# Patient Record
Sex: FEMALE | Race: WHITE | URBAN - METROPOLITAN AREA
[De-identification: names, ages, dates, MRNs, and addresses within clinical notes are randomized per-mention and may not be internally consistent; named-entity substitution may affect disease eponyms.]

---

## 2017-01-01 ENCOUNTER — APPOINTMENT (OUTPATIENT)
Dept: CARDIOLOGY | Facility: CLINIC | Age: 82
DRG: 314 | End: 2017-01-01
Attending: PHYSICIAN ASSISTANT
Payer: MEDICARE

## 2017-01-01 ENCOUNTER — APPOINTMENT (OUTPATIENT)
Dept: SPEECH THERAPY | Facility: CLINIC | Age: 82
DRG: 314 | End: 2017-01-01
Attending: PHYSICIAN ASSISTANT
Payer: MEDICARE

## 2017-01-01 ENCOUNTER — APPOINTMENT (OUTPATIENT)
Dept: CT IMAGING | Facility: CLINIC | Age: 82
DRG: 268 | End: 2017-01-01
Attending: SURGERY
Payer: MEDICARE

## 2017-01-01 ENCOUNTER — CARE COORDINATION (OUTPATIENT)
Dept: CARDIOLOGY | Facility: CLINIC | Age: 82
End: 2017-01-01

## 2017-01-01 ENCOUNTER — ANESTHESIA (OUTPATIENT)
Dept: SURGERY | Facility: CLINIC | Age: 82
DRG: 268 | End: 2017-01-01
Payer: MEDICARE

## 2017-01-01 ENCOUNTER — APPOINTMENT (OUTPATIENT)
Dept: ULTRASOUND IMAGING | Facility: CLINIC | Age: 82
DRG: 314 | End: 2017-01-01
Attending: PHYSICIAN ASSISTANT
Payer: MEDICARE

## 2017-01-01 ENCOUNTER — APPOINTMENT (OUTPATIENT)
Dept: GENERAL RADIOLOGY | Facility: CLINIC | Age: 82
DRG: 268 | End: 2017-01-01
Attending: SURGERY
Payer: MEDICARE

## 2017-01-01 ENCOUNTER — APPOINTMENT (OUTPATIENT)
Dept: GENERAL RADIOLOGY | Facility: CLINIC | Age: 82
DRG: 314 | End: 2017-01-01
Attending: PHYSICIAN ASSISTANT
Payer: MEDICARE

## 2017-01-01 ENCOUNTER — APPOINTMENT (OUTPATIENT)
Dept: SPEECH THERAPY | Facility: CLINIC | Age: 82
DRG: 314 | End: 2017-01-01
Attending: INTERNAL MEDICINE
Payer: MEDICARE

## 2017-01-01 ENCOUNTER — APPOINTMENT (OUTPATIENT)
Dept: MRI IMAGING | Facility: CLINIC | Age: 82
DRG: 268 | End: 2017-01-01
Attending: SURGERY
Payer: MEDICARE

## 2017-01-01 ENCOUNTER — APPOINTMENT (OUTPATIENT)
Dept: PHYSICAL THERAPY | Facility: CLINIC | Age: 82
DRG: 314 | End: 2017-01-01
Attending: INTERNAL MEDICINE
Payer: MEDICARE

## 2017-01-01 ENCOUNTER — ANESTHESIA EVENT (OUTPATIENT)
Dept: SURGERY | Facility: CLINIC | Age: 82
DRG: 268 | End: 2017-01-01
Payer: MEDICARE

## 2017-01-01 ENCOUNTER — APPOINTMENT (OUTPATIENT)
Dept: PHYSICAL THERAPY | Facility: CLINIC | Age: 82
DRG: 314 | End: 2017-01-01
Attending: PHYSICIAN ASSISTANT
Payer: MEDICARE

## 2017-01-01 ENCOUNTER — TELEPHONE (OUTPATIENT)
Dept: CARE COORDINATION | Facility: CLINIC | Age: 82
End: 2017-01-01

## 2017-01-01 ENCOUNTER — HOSPITAL ENCOUNTER (INPATIENT)
Facility: CLINIC | Age: 82
LOS: 11 days | Discharge: NURSING, REHAB OR LONG-TERM CARE | DRG: 268 | End: 2017-10-17
Attending: SURGERY | Admitting: SURGERY
Payer: MEDICARE

## 2017-01-01 ENCOUNTER — HOSPITAL ENCOUNTER (INPATIENT)
Facility: CLINIC | Age: 82
LOS: 11 days | Discharge: HOSPICE | DRG: 314 | End: 2017-11-27
Attending: INTERNAL MEDICINE | Admitting: INTERNAL MEDICINE
Payer: MEDICARE

## 2017-01-01 ENCOUNTER — APPOINTMENT (OUTPATIENT)
Dept: PHYSICAL THERAPY | Facility: CLINIC | Age: 82
DRG: 268 | End: 2017-01-01
Attending: SURGERY
Payer: MEDICARE

## 2017-01-01 ENCOUNTER — DOCUMENTATION ONLY (OUTPATIENT)
Dept: VASCULAR SURGERY | Facility: CLINIC | Age: 82
End: 2017-01-01

## 2017-01-01 ENCOUNTER — APPOINTMENT (OUTPATIENT)
Dept: GENERAL RADIOLOGY | Facility: CLINIC | Age: 82
DRG: 268 | End: 2017-01-01
Attending: CLINICAL NURSE SPECIALIST
Payer: MEDICARE

## 2017-01-01 ENCOUNTER — APPOINTMENT (OUTPATIENT)
Dept: OCCUPATIONAL THERAPY | Facility: CLINIC | Age: 82
DRG: 268 | End: 2017-01-01
Attending: SURGERY
Payer: MEDICARE

## 2017-01-01 ENCOUNTER — APPOINTMENT (OUTPATIENT)
Dept: INTERVENTIONAL RADIOLOGY/VASCULAR | Facility: CLINIC | Age: 82
DRG: 268 | End: 2017-01-01
Attending: SURGERY
Payer: MEDICARE

## 2017-01-01 ENCOUNTER — APPOINTMENT (OUTPATIENT)
Dept: PET IMAGING | Facility: CLINIC | Age: 82
DRG: 314 | End: 2017-01-01
Attending: PHYSICIAN ASSISTANT
Payer: MEDICARE

## 2017-01-01 ENCOUNTER — APPOINTMENT (OUTPATIENT)
Dept: CARDIOLOGY | Facility: CLINIC | Age: 82
DRG: 268 | End: 2017-01-01
Attending: SURGERY
Payer: MEDICARE

## 2017-01-01 ENCOUNTER — APPOINTMENT (OUTPATIENT)
Dept: SPEECH THERAPY | Facility: CLINIC | Age: 82
DRG: 268 | End: 2017-01-01
Attending: STUDENT IN AN ORGANIZED HEALTH CARE EDUCATION/TRAINING PROGRAM
Payer: MEDICARE

## 2017-01-01 ENCOUNTER — APPOINTMENT (OUTPATIENT)
Dept: ULTRASOUND IMAGING | Facility: CLINIC | Age: 82
DRG: 268 | End: 2017-01-01
Attending: SURGERY
Payer: MEDICARE

## 2017-01-01 ENCOUNTER — APPOINTMENT (OUTPATIENT)
Dept: CT IMAGING | Facility: CLINIC | Age: 82
DRG: 314 | End: 2017-01-01
Attending: INTERNAL MEDICINE
Payer: MEDICARE

## 2017-01-01 VITALS
HEIGHT: 68 IN | TEMPERATURE: 98.4 F | HEART RATE: 80 BPM | DIASTOLIC BLOOD PRESSURE: 50 MMHG | WEIGHT: 180 LBS | BODY MASS INDEX: 27.28 KG/M2 | OXYGEN SATURATION: 95 % | SYSTOLIC BLOOD PRESSURE: 114 MMHG | RESPIRATION RATE: 20 BRPM

## 2017-01-01 VITALS
HEIGHT: 68 IN | TEMPERATURE: 98.2 F | WEIGHT: 167.1 LBS | RESPIRATION RATE: 18 BRPM | BODY MASS INDEX: 25.33 KG/M2 | DIASTOLIC BLOOD PRESSURE: 66 MMHG | HEART RATE: 111 BPM | SYSTOLIC BLOOD PRESSURE: 138 MMHG | OXYGEN SATURATION: 97 %

## 2017-01-01 DIAGNOSIS — R62.51 FAILURE TO THRIVE (0-17): Primary | ICD-10-CM

## 2017-01-01 DIAGNOSIS — Z51.5 COMFORT MEASURES ONLY STATUS: ICD-10-CM

## 2017-01-01 DIAGNOSIS — I71.40 ABDOMINAL AORTIC ANEURYSM (AAA) WITHOUT RUPTURE (H): Primary | ICD-10-CM

## 2017-01-01 LAB
ABO + RH BLD: NORMAL
ALBUMIN SERPL-MCNC: 1.8 G/DL (ref 3.4–5)
ALBUMIN SERPL-MCNC: 2.4 G/DL (ref 3.4–5)
ALBUMIN UR-MCNC: 100 MG/DL
ALP SERPL-CCNC: 38 U/L (ref 40–150)
ALP SERPL-CCNC: 45 U/L (ref 40–150)
ALT SERPL W P-5'-P-CCNC: 18 U/L (ref 0–50)
ALT SERPL W P-5'-P-CCNC: 19 U/L (ref 0–50)
ANION GAP SERPL CALCULATED.3IONS-SCNC: 10 MMOL/L (ref 3–14)
ANION GAP SERPL CALCULATED.3IONS-SCNC: 11 MMOL/L (ref 3–14)
ANION GAP SERPL CALCULATED.3IONS-SCNC: 11 MMOL/L (ref 3–14)
ANION GAP SERPL CALCULATED.3IONS-SCNC: 7 MMOL/L (ref 3–14)
ANION GAP SERPL CALCULATED.3IONS-SCNC: 8 MMOL/L (ref 3–14)
ANION GAP SERPL CALCULATED.3IONS-SCNC: 9 MMOL/L (ref 3–14)
APPEARANCE UR: ABNORMAL
APTT PPP: 38 SEC (ref 22–37)
AST SERPL W P-5'-P-CCNC: 24 U/L (ref 0–45)
AST SERPL W P-5'-P-CCNC: 27 U/L (ref 0–45)
BACTERIA #/AREA URNS HPF: ABNORMAL /HPF
BACTERIA SPEC CULT: NO GROWTH
BASE DEFICIT BLDA-SCNC: 6 MMOL/L
BASE DEFICIT BLDA-SCNC: 7 MMOL/L
BASE DEFICIT BLDA-SCNC: 7 MMOL/L
BASE DEFICIT BLDA-SCNC: 7.9 MMOL/L
BILIRUB SERPL-MCNC: 0.2 MG/DL (ref 0.2–1.3)
BILIRUB SERPL-MCNC: 0.4 MG/DL (ref 0.2–1.3)
BILIRUB UR QL STRIP: NEGATIVE
BLD GP AB SCN SERPL QL: NORMAL
BLD GP AB SCN SERPL QL: NORMAL
BLD PROD TYP BPU: NORMAL
BLD UNIT ID BPU: 0
BLOOD BANK CMNT PATIENT-IMP: NORMAL
BLOOD BANK CMNT PATIENT-IMP: NORMAL
BLOOD PRODUCT CODE: NORMAL
BPU ID: NORMAL
BUN SERPL-MCNC: 19 MG/DL (ref 7–30)
BUN SERPL-MCNC: 22 MG/DL (ref 7–30)
BUN SERPL-MCNC: 24 MG/DL (ref 7–30)
BUN SERPL-MCNC: 24 MG/DL (ref 7–30)
BUN SERPL-MCNC: 27 MG/DL (ref 7–30)
BUN SERPL-MCNC: 27 MG/DL (ref 7–30)
BUN SERPL-MCNC: 28 MG/DL (ref 7–30)
BUN SERPL-MCNC: 28 MG/DL (ref 7–30)
BUN SERPL-MCNC: 29 MG/DL (ref 7–30)
BUN SERPL-MCNC: 29 MG/DL (ref 7–30)
BUN SERPL-MCNC: 32 MG/DL (ref 7–30)
BUN SERPL-MCNC: 36 MG/DL (ref 7–30)
BUN SERPL-MCNC: 42 MG/DL (ref 7–30)
BUN SERPL-MCNC: 45 MG/DL (ref 7–30)
BUN SERPL-MCNC: 49 MG/DL (ref 7–30)
BUN SERPL-MCNC: 52 MG/DL (ref 7–30)
BUN SERPL-MCNC: 57 MG/DL (ref 7–30)
CA-I BLD-MCNC: 4.6 MG/DL (ref 4.4–5.2)
CA-I BLD-MCNC: 4.9 MG/DL (ref 4.4–5.2)
CA-I BLD-MCNC: 5.1 MG/DL (ref 4.4–5.2)
CALCIUM SERPL-MCNC: 7.8 MG/DL (ref 8.5–10.1)
CALCIUM SERPL-MCNC: 7.9 MG/DL (ref 8.5–10.1)
CALCIUM SERPL-MCNC: 8 MG/DL (ref 8.5–10.1)
CALCIUM SERPL-MCNC: 8.2 MG/DL (ref 8.5–10.1)
CALCIUM SERPL-MCNC: 8.2 MG/DL (ref 8.5–10.1)
CALCIUM SERPL-MCNC: 8.3 MG/DL (ref 8.5–10.1)
CALCIUM SERPL-MCNC: 8.4 MG/DL (ref 8.5–10.1)
CALCIUM SERPL-MCNC: 8.4 MG/DL (ref 8.5–10.1)
CALCIUM SERPL-MCNC: 8.5 MG/DL (ref 8.5–10.1)
CALCIUM SERPL-MCNC: 8.6 MG/DL (ref 8.5–10.1)
CALCIUM SERPL-MCNC: 8.6 MG/DL (ref 8.5–10.1)
CALCIUM SERPL-MCNC: 9 MG/DL (ref 8.5–10.1)
CHLORIDE SERPL-SCNC: 100 MMOL/L (ref 94–109)
CHLORIDE SERPL-SCNC: 101 MMOL/L (ref 94–109)
CHLORIDE SERPL-SCNC: 103 MMOL/L (ref 94–109)
CHLORIDE SERPL-SCNC: 106 MMOL/L (ref 94–109)
CHLORIDE SERPL-SCNC: 107 MMOL/L (ref 94–109)
CHLORIDE SERPL-SCNC: 107 MMOL/L (ref 94–109)
CHLORIDE SERPL-SCNC: 108 MMOL/L (ref 94–109)
CHLORIDE SERPL-SCNC: 108 MMOL/L (ref 94–109)
CHLORIDE SERPL-SCNC: 109 MMOL/L (ref 94–109)
CHLORIDE SERPL-SCNC: 111 MMOL/L (ref 94–109)
CHLORIDE SERPL-SCNC: 112 MMOL/L (ref 94–109)
CHLORIDE SERPL-SCNC: 113 MMOL/L (ref 94–109)
CHLORIDE SERPL-SCNC: 113 MMOL/L (ref 94–109)
CHLORIDE SERPL-SCNC: 115 MMOL/L (ref 94–109)
CHLORIDE SERPL-SCNC: 116 MMOL/L (ref 94–109)
CO2 SERPL-SCNC: 14 MMOL/L (ref 20–32)
CO2 SERPL-SCNC: 17 MMOL/L (ref 20–32)
CO2 SERPL-SCNC: 17 MMOL/L (ref 20–32)
CO2 SERPL-SCNC: 19 MMOL/L (ref 20–32)
CO2 SERPL-SCNC: 20 MMOL/L (ref 20–32)
CO2 SERPL-SCNC: 21 MMOL/L (ref 20–32)
CO2 SERPL-SCNC: 21 MMOL/L (ref 20–32)
CO2 SERPL-SCNC: 22 MMOL/L (ref 20–32)
CO2 SERPL-SCNC: 22 MMOL/L (ref 20–32)
CO2 SERPL-SCNC: 23 MMOL/L (ref 20–32)
CO2 SERPL-SCNC: 23 MMOL/L (ref 20–32)
CO2 SERPL-SCNC: 24 MMOL/L (ref 20–32)
CO2 SERPL-SCNC: 24 MMOL/L (ref 20–32)
COLOR UR AUTO: YELLOW
CREAT SERPL-MCNC: 2.09 MG/DL (ref 0.52–1.04)
CREAT SERPL-MCNC: 2.17 MG/DL (ref 0.52–1.04)
CREAT SERPL-MCNC: 2.21 MG/DL (ref 0.52–1.04)
CREAT SERPL-MCNC: 2.27 MG/DL (ref 0.52–1.04)
CREAT SERPL-MCNC: 2.38 MG/DL (ref 0.52–1.04)
CREAT SERPL-MCNC: 2.49 MG/DL (ref 0.52–1.04)
CREAT SERPL-MCNC: 2.54 MG/DL (ref 0.52–1.04)
CREAT SERPL-MCNC: 3.46 MG/DL (ref 0.52–1.04)
CREAT SERPL-MCNC: 3.56 MG/DL (ref 0.52–1.04)
CREAT SERPL-MCNC: 3.62 MG/DL (ref 0.52–1.04)
CREAT SERPL-MCNC: 3.74 MG/DL (ref 0.52–1.04)
CREAT SERPL-MCNC: 3.75 MG/DL (ref 0.52–1.04)
CREAT SERPL-MCNC: 3.82 MG/DL (ref 0.52–1.04)
CREAT SERPL-MCNC: 3.87 MG/DL (ref 0.52–1.04)
CREAT SERPL-MCNC: 3.89 MG/DL (ref 0.52–1.04)
CREAT SERPL-MCNC: 4.06 MG/DL (ref 0.52–1.04)
CREAT SERPL-MCNC: 4.27 MG/DL (ref 0.52–1.04)
CRP SERPL-MCNC: 110 MG/L (ref 0–8)
CRP SERPL-MCNC: 120 MG/L (ref 0–8)
CRP SERPL-MCNC: 120 MG/L (ref 0–8)
CRP SERPL-MCNC: 130 MG/L (ref 0–8)
CRP SERPL-MCNC: 36 MG/L (ref 0–8)
CRP SERPL-MCNC: 42 MG/L (ref 0–8)
CRP SERPL-MCNC: 63 MG/L (ref 0–8)
CRP SERPL-MCNC: 84 MG/L (ref 0–8)
ERYTHROCYTE [DISTWIDTH] IN BLOOD BY AUTOMATED COUNT: 14.2 % (ref 10–15)
ERYTHROCYTE [DISTWIDTH] IN BLOOD BY AUTOMATED COUNT: 14.2 % (ref 10–15)
ERYTHROCYTE [DISTWIDTH] IN BLOOD BY AUTOMATED COUNT: 14.8 % (ref 10–15)
ERYTHROCYTE [DISTWIDTH] IN BLOOD BY AUTOMATED COUNT: 15 % (ref 10–15)
ERYTHROCYTE [DISTWIDTH] IN BLOOD BY AUTOMATED COUNT: 15.1 % (ref 10–15)
ERYTHROCYTE [DISTWIDTH] IN BLOOD BY AUTOMATED COUNT: 15.3 % (ref 10–15)
ERYTHROCYTE [DISTWIDTH] IN BLOOD BY AUTOMATED COUNT: 15.5 % (ref 10–15)
ERYTHROCYTE [DISTWIDTH] IN BLOOD BY AUTOMATED COUNT: 15.7 % (ref 10–15)
ERYTHROCYTE [DISTWIDTH] IN BLOOD BY AUTOMATED COUNT: 15.7 % (ref 10–15)
ERYTHROCYTE [DISTWIDTH] IN BLOOD BY AUTOMATED COUNT: 15.8 % (ref 10–15)
ERYTHROCYTE [DISTWIDTH] IN BLOOD BY AUTOMATED COUNT: 16.1 % (ref 10–15)
ERYTHROCYTE [DISTWIDTH] IN BLOOD BY AUTOMATED COUNT: 16.3 % (ref 10–15)
ERYTHROCYTE [SEDIMENTATION RATE] IN BLOOD BY WESTERGREN METHOD: 92 MM/H (ref 0–30)
GFR SERPL CREATININE-BSD FRML MDRD: 10 ML/MIN/1.7M2
GFR SERPL CREATININE-BSD FRML MDRD: 10 ML/MIN/1.7M2
GFR SERPL CREATININE-BSD FRML MDRD: 11 ML/MIN/1.7M2
GFR SERPL CREATININE-BSD FRML MDRD: 12 ML/MIN/1.7M2
GFR SERPL CREATININE-BSD FRML MDRD: 13 ML/MIN/1.7M2
GFR SERPL CREATININE-BSD FRML MDRD: 18 ML/MIN/1.7M2
GFR SERPL CREATININE-BSD FRML MDRD: 18 ML/MIN/1.7M2
GFR SERPL CREATININE-BSD FRML MDRD: 19 ML/MIN/1.7M2
GFR SERPL CREATININE-BSD FRML MDRD: 20 ML/MIN/1.7M2
GFR SERPL CREATININE-BSD FRML MDRD: 21 ML/MIN/1.7M2
GFR SERPL CREATININE-BSD FRML MDRD: 22 ML/MIN/1.7M2
GFR SERPL CREATININE-BSD FRML MDRD: 23 ML/MIN/1.7M2
GLUCOSE BLD-MCNC: 108 MG/DL (ref 70–99)
GLUCOSE BLD-MCNC: 114 MG/DL (ref 70–99)
GLUCOSE BLD-MCNC: 119 MG/DL (ref 70–99)
GLUCOSE BLDC GLUCOMTR-MCNC: 100 MG/DL (ref 70–99)
GLUCOSE BLDC GLUCOMTR-MCNC: 101 MG/DL (ref 70–99)
GLUCOSE BLDC GLUCOMTR-MCNC: 102 MG/DL (ref 70–99)
GLUCOSE BLDC GLUCOMTR-MCNC: 103 MG/DL (ref 70–99)
GLUCOSE BLDC GLUCOMTR-MCNC: 104 MG/DL (ref 70–99)
GLUCOSE BLDC GLUCOMTR-MCNC: 105 MG/DL (ref 70–99)
GLUCOSE BLDC GLUCOMTR-MCNC: 106 MG/DL (ref 70–99)
GLUCOSE BLDC GLUCOMTR-MCNC: 106 MG/DL (ref 70–99)
GLUCOSE BLDC GLUCOMTR-MCNC: 107 MG/DL (ref 70–99)
GLUCOSE BLDC GLUCOMTR-MCNC: 108 MG/DL (ref 70–99)
GLUCOSE BLDC GLUCOMTR-MCNC: 109 MG/DL (ref 70–99)
GLUCOSE BLDC GLUCOMTR-MCNC: 110 MG/DL (ref 70–99)
GLUCOSE BLDC GLUCOMTR-MCNC: 111 MG/DL (ref 70–99)
GLUCOSE BLDC GLUCOMTR-MCNC: 112 MG/DL (ref 70–99)
GLUCOSE BLDC GLUCOMTR-MCNC: 113 MG/DL (ref 70–99)
GLUCOSE BLDC GLUCOMTR-MCNC: 113 MG/DL (ref 70–99)
GLUCOSE BLDC GLUCOMTR-MCNC: 114 MG/DL (ref 70–99)
GLUCOSE BLDC GLUCOMTR-MCNC: 115 MG/DL (ref 70–99)
GLUCOSE BLDC GLUCOMTR-MCNC: 115 MG/DL (ref 70–99)
GLUCOSE BLDC GLUCOMTR-MCNC: 116 MG/DL (ref 70–99)
GLUCOSE BLDC GLUCOMTR-MCNC: 119 MG/DL (ref 70–99)
GLUCOSE BLDC GLUCOMTR-MCNC: 120 MG/DL (ref 70–99)
GLUCOSE BLDC GLUCOMTR-MCNC: 121 MG/DL (ref 70–99)
GLUCOSE BLDC GLUCOMTR-MCNC: 122 MG/DL (ref 70–99)
GLUCOSE BLDC GLUCOMTR-MCNC: 123 MG/DL (ref 70–99)
GLUCOSE BLDC GLUCOMTR-MCNC: 124 MG/DL (ref 70–99)
GLUCOSE BLDC GLUCOMTR-MCNC: 125 MG/DL (ref 70–99)
GLUCOSE BLDC GLUCOMTR-MCNC: 126 MG/DL (ref 70–99)
GLUCOSE BLDC GLUCOMTR-MCNC: 126 MG/DL (ref 70–99)
GLUCOSE BLDC GLUCOMTR-MCNC: 127 MG/DL (ref 70–99)
GLUCOSE BLDC GLUCOMTR-MCNC: 127 MG/DL (ref 70–99)
GLUCOSE BLDC GLUCOMTR-MCNC: 128 MG/DL (ref 70–99)
GLUCOSE BLDC GLUCOMTR-MCNC: 130 MG/DL (ref 70–99)
GLUCOSE BLDC GLUCOMTR-MCNC: 132 MG/DL (ref 70–99)
GLUCOSE BLDC GLUCOMTR-MCNC: 132 MG/DL (ref 70–99)
GLUCOSE BLDC GLUCOMTR-MCNC: 133 MG/DL (ref 70–99)
GLUCOSE BLDC GLUCOMTR-MCNC: 134 MG/DL (ref 70–99)
GLUCOSE BLDC GLUCOMTR-MCNC: 135 MG/DL (ref 70–99)
GLUCOSE BLDC GLUCOMTR-MCNC: 135 MG/DL (ref 70–99)
GLUCOSE BLDC GLUCOMTR-MCNC: 137 MG/DL (ref 70–99)
GLUCOSE BLDC GLUCOMTR-MCNC: 137 MG/DL (ref 70–99)
GLUCOSE BLDC GLUCOMTR-MCNC: 140 MG/DL (ref 70–99)
GLUCOSE BLDC GLUCOMTR-MCNC: 140 MG/DL (ref 70–99)
GLUCOSE BLDC GLUCOMTR-MCNC: 141 MG/DL (ref 70–99)
GLUCOSE BLDC GLUCOMTR-MCNC: 142 MG/DL (ref 70–99)
GLUCOSE BLDC GLUCOMTR-MCNC: 143 MG/DL (ref 70–99)
GLUCOSE BLDC GLUCOMTR-MCNC: 144 MG/DL (ref 70–99)
GLUCOSE BLDC GLUCOMTR-MCNC: 145 MG/DL (ref 70–99)
GLUCOSE BLDC GLUCOMTR-MCNC: 147 MG/DL (ref 70–99)
GLUCOSE BLDC GLUCOMTR-MCNC: 150 MG/DL (ref 70–99)
GLUCOSE BLDC GLUCOMTR-MCNC: 151 MG/DL (ref 70–99)
GLUCOSE BLDC GLUCOMTR-MCNC: 156 MG/DL (ref 70–99)
GLUCOSE BLDC GLUCOMTR-MCNC: 157 MG/DL (ref 70–99)
GLUCOSE BLDC GLUCOMTR-MCNC: 165 MG/DL (ref 70–99)
GLUCOSE BLDC GLUCOMTR-MCNC: 166 MG/DL (ref 70–99)
GLUCOSE BLDC GLUCOMTR-MCNC: 169 MG/DL (ref 70–99)
GLUCOSE BLDC GLUCOMTR-MCNC: 175 MG/DL (ref 70–99)
GLUCOSE BLDC GLUCOMTR-MCNC: 180 MG/DL (ref 70–99)
GLUCOSE BLDC GLUCOMTR-MCNC: 181 MG/DL (ref 70–99)
GLUCOSE BLDC GLUCOMTR-MCNC: 185 MG/DL (ref 70–99)
GLUCOSE BLDC GLUCOMTR-MCNC: 188 MG/DL (ref 70–99)
GLUCOSE BLDC GLUCOMTR-MCNC: 188 MG/DL (ref 70–99)
GLUCOSE BLDC GLUCOMTR-MCNC: 86 MG/DL (ref 70–99)
GLUCOSE BLDC GLUCOMTR-MCNC: 89 MG/DL (ref 70–99)
GLUCOSE BLDC GLUCOMTR-MCNC: 94 MG/DL (ref 70–99)
GLUCOSE BLDC GLUCOMTR-MCNC: 94 MG/DL (ref 70–99)
GLUCOSE BLDC GLUCOMTR-MCNC: 98 MG/DL (ref 70–99)
GLUCOSE SERPL-MCNC: 107 MG/DL (ref 70–99)
GLUCOSE SERPL-MCNC: 108 MG/DL (ref 70–99)
GLUCOSE SERPL-MCNC: 108 MG/DL (ref 70–99)
GLUCOSE SERPL-MCNC: 115 MG/DL (ref 70–99)
GLUCOSE SERPL-MCNC: 116 MG/DL (ref 70–99)
GLUCOSE SERPL-MCNC: 119 MG/DL (ref 70–99)
GLUCOSE SERPL-MCNC: 122 MG/DL (ref 70–99)
GLUCOSE SERPL-MCNC: 122 MG/DL (ref 70–99)
GLUCOSE SERPL-MCNC: 123 MG/DL (ref 70–99)
GLUCOSE SERPL-MCNC: 127 MG/DL (ref 70–99)
GLUCOSE SERPL-MCNC: 127 MG/DL (ref 70–99)
GLUCOSE SERPL-MCNC: 128 MG/DL (ref 70–99)
GLUCOSE SERPL-MCNC: 131 MG/DL (ref 70–99)
GLUCOSE SERPL-MCNC: 132 MG/DL (ref 70–99)
GLUCOSE SERPL-MCNC: 137 MG/DL (ref 70–99)
GLUCOSE SERPL-MCNC: 141 MG/DL (ref 70–99)
GLUCOSE SERPL-MCNC: 184 MG/DL (ref 70–99)
GLUCOSE UR STRIP-MCNC: 300 MG/DL
HBA1C MFR BLD: 6.7 % (ref 4.3–6)
HCO3 BLD-SCNC: 19 MMOL/L (ref 21–28)
HCO3 BLD-SCNC: 20 MMOL/L (ref 21–28)
HCO3 BLD-SCNC: 21 MMOL/L (ref 21–28)
HCO3 BLD-SCNC: 22 MMOL/L (ref 21–28)
HCT VFR BLD AUTO: 26.3 % (ref 35–47)
HCT VFR BLD AUTO: 26.7 % (ref 35–47)
HCT VFR BLD AUTO: 26.7 % (ref 35–47)
HCT VFR BLD AUTO: 27 % (ref 35–47)
HCT VFR BLD AUTO: 27.2 % (ref 35–47)
HCT VFR BLD AUTO: 27.6 % (ref 35–47)
HCT VFR BLD AUTO: 27.9 % (ref 35–47)
HCT VFR BLD AUTO: 28.2 % (ref 35–47)
HCT VFR BLD AUTO: 28.3 % (ref 35–47)
HCT VFR BLD AUTO: 29.7 % (ref 35–47)
HCT VFR BLD AUTO: 29.8 % (ref 35–47)
HCT VFR BLD AUTO: 30.1 % (ref 35–47)
HCT VFR BLD AUTO: 31 % (ref 35–47)
HCT VFR BLD AUTO: 31.6 % (ref 35–47)
HCT VFR BLD AUTO: 34.9 % (ref 35–47)
HGB BLD-MCNC: 10.3 G/DL (ref 11.7–15.7)
HGB BLD-MCNC: 10.7 G/DL (ref 11.7–15.7)
HGB BLD-MCNC: 11.1 G/DL (ref 11.7–15.7)
HGB BLD-MCNC: 8.1 G/DL (ref 11.7–15.7)
HGB BLD-MCNC: 8.1 G/DL (ref 11.7–15.7)
HGB BLD-MCNC: 8.2 G/DL (ref 11.7–15.7)
HGB BLD-MCNC: 8.3 G/DL (ref 11.7–15.7)
HGB BLD-MCNC: 8.3 G/DL (ref 11.7–15.7)
HGB BLD-MCNC: 8.4 G/DL (ref 11.7–15.7)
HGB BLD-MCNC: 8.5 G/DL (ref 11.7–15.7)
HGB BLD-MCNC: 8.5 G/DL (ref 11.7–15.7)
HGB BLD-MCNC: 8.7 G/DL (ref 11.7–15.7)
HGB BLD-MCNC: 9.1 G/DL (ref 11.7–15.7)
HGB BLD-MCNC: 9.3 G/DL (ref 11.7–15.7)
HGB BLD-MCNC: 9.3 G/DL (ref 11.7–15.7)
HGB BLD-MCNC: 9.4 G/DL (ref 11.7–15.7)
HGB BLD-MCNC: 9.7 G/DL (ref 11.7–15.7)
HGB BLD-MCNC: 9.9 G/DL (ref 11.7–15.7)
HGB UR QL STRIP: ABNORMAL
INR PPP: 1.14 (ref 0.86–1.14)
INR PPP: 1.2 (ref 0.86–1.14)
INTERPRETATION ECG - MUSE: NORMAL
KCT BLD-ACNC: 103 SEC (ref 105–167)
KCT BLD-ACNC: 164 SEC (ref 105–167)
KCT BLD-ACNC: 196 SEC (ref 105–167)
KCT BLD-ACNC: 221 SEC (ref 105–167)
KCT BLD-ACNC: 225 SEC (ref 105–167)
KCT BLD-ACNC: 225 SEC (ref 105–167)
KETONES UR STRIP-MCNC: NEGATIVE MG/DL
LACTATE BLD-SCNC: 0.4 MMOL/L (ref 0.7–2)
LACTATE BLD-SCNC: 0.5 MMOL/L (ref 0.7–2)
LACTATE BLD-SCNC: 0.7 MMOL/L (ref 0.7–2)
LACTATE BLD-SCNC: 0.8 MMOL/L (ref 0.7–2)
LACTATE BLD-SCNC: 0.9 MMOL/L (ref 0.7–2)
LACTATE SERPL-SCNC: 0.7 MMOL/L (ref 0.4–2)
LACTATE SERPL-SCNC: 1 MMOL/L (ref 0.4–2)
LEUKOCYTE ESTERASE UR QL STRIP: ABNORMAL
LIPASE SERPL-CCNC: 635 U/L (ref 73–393)
Lab: NORMAL
MAGNESIUM SERPL-MCNC: 2.1 MG/DL (ref 1.6–2.3)
MAGNESIUM SERPL-MCNC: 2.2 MG/DL (ref 1.6–2.3)
MAGNESIUM SERPL-MCNC: 2.5 MG/DL (ref 1.6–2.3)
MCH RBC QN AUTO: 26.1 PG (ref 26.5–33)
MCH RBC QN AUTO: 26.4 PG (ref 26.5–33)
MCH RBC QN AUTO: 26.6 PG (ref 26.5–33)
MCH RBC QN AUTO: 26.7 PG (ref 26.5–33)
MCH RBC QN AUTO: 26.8 PG (ref 26.5–33)
MCH RBC QN AUTO: 26.9 PG (ref 26.5–33)
MCH RBC QN AUTO: 27 PG (ref 26.5–33)
MCH RBC QN AUTO: 27.8 PG (ref 26.5–33)
MCH RBC QN AUTO: 28 PG (ref 26.5–33)
MCH RBC QN AUTO: 28 PG (ref 26.5–33)
MCH RBC QN AUTO: 28.1 PG (ref 26.5–33)
MCH RBC QN AUTO: 28.1 PG (ref 26.5–33)
MCH RBC QN AUTO: 28.3 PG (ref 26.5–33)
MCHC RBC AUTO-ENTMCNC: 29 G/DL (ref 31.5–36.5)
MCHC RBC AUTO-ENTMCNC: 30 G/DL (ref 31.5–36.5)
MCHC RBC AUTO-ENTMCNC: 30.3 G/DL (ref 31.5–36.5)
MCHC RBC AUTO-ENTMCNC: 30.4 G/DL (ref 31.5–36.5)
MCHC RBC AUTO-ENTMCNC: 30.5 G/DL (ref 31.5–36.5)
MCHC RBC AUTO-ENTMCNC: 30.5 G/DL (ref 31.5–36.5)
MCHC RBC AUTO-ENTMCNC: 30.9 G/DL (ref 31.5–36.5)
MCHC RBC AUTO-ENTMCNC: 30.9 G/DL (ref 31.5–36.5)
MCHC RBC AUTO-ENTMCNC: 31.2 G/DL (ref 31.5–36.5)
MCHC RBC AUTO-ENTMCNC: 31.3 G/DL (ref 31.5–36.5)
MCHC RBC AUTO-ENTMCNC: 31.3 G/DL (ref 31.5–36.5)
MCHC RBC AUTO-ENTMCNC: 31.6 G/DL (ref 31.5–36.5)
MCHC RBC AUTO-ENTMCNC: 31.6 G/DL (ref 31.5–36.5)
MCHC RBC AUTO-ENTMCNC: 31.8 G/DL (ref 31.5–36.5)
MCHC RBC AUTO-ENTMCNC: 31.8 G/DL (ref 31.5–36.5)
MCV RBC AUTO: 86 FL (ref 78–100)
MCV RBC AUTO: 86 FL (ref 78–100)
MCV RBC AUTO: 87 FL (ref 78–100)
MCV RBC AUTO: 88 FL (ref 78–100)
MCV RBC AUTO: 89 FL (ref 78–100)
MCV RBC AUTO: 90 FL (ref 78–100)
MCV RBC AUTO: 93 FL (ref 78–100)
MRSA DNA SPEC QL NAA+PROBE: NEGATIVE
MUCOUS THREADS #/AREA URNS LPF: PRESENT /LPF
NITRATE UR QL: NEGATIVE
NUM BPU REQUESTED: 4
O2/TOTAL GAS SETTING VFR VENT: 21 %
O2/TOTAL GAS SETTING VFR VENT: 60 %
O2/TOTAL GAS SETTING VFR VENT: 60 %
O2/TOTAL GAS SETTING VFR VENT: ABNORMAL %
PCO2 BLD: 32 MM HG (ref 35–45)
PCO2 BLD: 47 MM HG (ref 35–45)
PCO2 BLD: 60 MM HG (ref 35–45)
PCO2 BLD: 61 MM HG (ref 35–45)
PH BLD: 7.16 PH (ref 7.35–7.45)
PH BLD: 7.16 PH (ref 7.35–7.45)
PH BLD: 7.24 PH (ref 7.35–7.45)
PH BLD: 7.37 PH (ref 7.35–7.45)
PH UR STRIP: 6 PH (ref 5–7)
PHOSPHATE SERPL-MCNC: 3.3 MG/DL (ref 2.5–4.5)
PLATELET # BLD AUTO: 151 10E9/L (ref 150–450)
PLATELET # BLD AUTO: 151 10E9/L (ref 150–450)
PLATELET # BLD AUTO: 153 10E9/L (ref 150–450)
PLATELET # BLD AUTO: 159 10E9/L (ref 150–450)
PLATELET # BLD AUTO: 160 10E9/L (ref 150–450)
PLATELET # BLD AUTO: 163 10E9/L (ref 150–450)
PLATELET # BLD AUTO: 171 10E9/L (ref 150–450)
PLATELET # BLD AUTO: 174 10E9/L (ref 150–450)
PLATELET # BLD AUTO: 175 10E9/L (ref 150–450)
PLATELET # BLD AUTO: 182 10E9/L (ref 150–450)
PLATELET # BLD AUTO: 183 10E9/L (ref 150–450)
PLATELET # BLD AUTO: 187 10E9/L (ref 150–450)
PLATELET # BLD AUTO: 189 10E9/L (ref 150–450)
PLATELET # BLD AUTO: 197 10E9/L (ref 150–450)
PLATELET # BLD AUTO: 199 10E9/L (ref 150–450)
PLATELET # BLD AUTO: 218 10E9/L (ref 150–450)
PLATELET # BLD AUTO: 246 10E9/L (ref 150–450)
PO2 BLD: 122 MM HG (ref 80–105)
PO2 BLD: 66 MM HG (ref 80–105)
PO2 BLD: 85 MM HG (ref 80–105)
PO2 BLD: 89 MM HG (ref 80–105)
POTASSIUM BLD-SCNC: 3.1 MMOL/L (ref 3.4–5.3)
POTASSIUM BLD-SCNC: 3.5 MMOL/L (ref 3.4–5.3)
POTASSIUM BLD-SCNC: 3.6 MMOL/L (ref 3.4–5.3)
POTASSIUM SERPL-SCNC: 3 MMOL/L (ref 3.4–5.3)
POTASSIUM SERPL-SCNC: 3.1 MMOL/L (ref 3.4–5.3)
POTASSIUM SERPL-SCNC: 3.3 MMOL/L (ref 3.4–5.3)
POTASSIUM SERPL-SCNC: 3.4 MMOL/L (ref 3.4–5.3)
POTASSIUM SERPL-SCNC: 3.4 MMOL/L (ref 3.4–5.3)
POTASSIUM SERPL-SCNC: 3.5 MMOL/L (ref 3.4–5.3)
POTASSIUM SERPL-SCNC: 3.6 MMOL/L (ref 3.4–5.3)
POTASSIUM SERPL-SCNC: 3.7 MMOL/L (ref 3.4–5.3)
POTASSIUM SERPL-SCNC: 3.7 MMOL/L (ref 3.4–5.3)
POTASSIUM SERPL-SCNC: 3.8 MMOL/L (ref 3.4–5.3)
POTASSIUM SERPL-SCNC: 3.8 MMOL/L (ref 3.4–5.3)
POTASSIUM SERPL-SCNC: 4 MMOL/L (ref 3.4–5.3)
POTASSIUM SERPL-SCNC: 4.2 MMOL/L (ref 3.4–5.3)
PROCALCITONIN SERPL-MCNC: 0.24 NG/ML
PROCALCITONIN SERPL-MCNC: 0.38 NG/ML
PROT SERPL-MCNC: 5.8 G/DL (ref 6.8–8.8)
PROT SERPL-MCNC: 7 G/DL (ref 6.8–8.8)
RADIOLOGIST FLAGS: ABNORMAL
RADIOLOGIST FLAGS: ABNORMAL
RBC # BLD AUTO: 2.99 10E12/L (ref 3.8–5.2)
RBC # BLD AUTO: 3.04 10E12/L (ref 3.8–5.2)
RBC # BLD AUTO: 3.06 10E12/L (ref 3.8–5.2)
RBC # BLD AUTO: 3.07 10E12/L (ref 3.8–5.2)
RBC # BLD AUTO: 3.07 10E12/L (ref 3.8–5.2)
RBC # BLD AUTO: 3.1 10E12/L (ref 3.8–5.2)
RBC # BLD AUTO: 3.16 10E12/L (ref 3.8–5.2)
RBC # BLD AUTO: 3.19 10E12/L (ref 3.8–5.2)
RBC # BLD AUTO: 3.26 10E12/L (ref 3.8–5.2)
RBC # BLD AUTO: 3.31 10E12/L (ref 3.8–5.2)
RBC # BLD AUTO: 3.34 10E12/L (ref 3.8–5.2)
RBC # BLD AUTO: 3.5 10E12/L (ref 3.8–5.2)
RBC # BLD AUTO: 3.54 10E12/L (ref 3.8–5.2)
RBC # BLD AUTO: 3.6 10E12/L (ref 3.8–5.2)
RBC # BLD AUTO: 3.92 10E12/L (ref 3.8–5.2)
RBC #/AREA URNS AUTO: <1 /HPF (ref 0–2)
SODIUM BLD-SCNC: 137 MMOL/L (ref 133–144)
SODIUM BLD-SCNC: 138 MMOL/L (ref 133–144)
SODIUM BLD-SCNC: 138 MMOL/L (ref 133–144)
SODIUM SERPL-SCNC: 132 MMOL/L (ref 133–144)
SODIUM SERPL-SCNC: 133 MMOL/L (ref 133–144)
SODIUM SERPL-SCNC: 134 MMOL/L (ref 133–144)
SODIUM SERPL-SCNC: 136 MMOL/L (ref 133–144)
SODIUM SERPL-SCNC: 137 MMOL/L (ref 133–144)
SODIUM SERPL-SCNC: 138 MMOL/L (ref 133–144)
SODIUM SERPL-SCNC: 141 MMOL/L (ref 133–144)
SODIUM SERPL-SCNC: 142 MMOL/L (ref 133–144)
SODIUM SERPL-SCNC: 142 MMOL/L (ref 133–144)
SOURCE: ABNORMAL
SP GR UR STRIP: 1.01 (ref 1–1.03)
SPECIMEN EXP DATE BLD: NORMAL
SPECIMEN EXP DATE BLD: NORMAL
SPECIMEN SOURCE: NORMAL
T4 FREE SERPL-MCNC: 1.05 NG/DL (ref 0.76–1.46)
TRANS CELLS #/AREA URNS HPF: <1 /HPF (ref 0–1)
TRANSFUSION STATUS PATIENT QL: NORMAL
TROPONIN I SERPL-MCNC: <0.015 UG/L (ref 0–0.04)
TSH SERPL DL<=0.005 MIU/L-ACNC: 9.78 MU/L (ref 0.4–4)
UROBILINOGEN UR STRIP-MCNC: NORMAL MG/DL (ref 0–2)
VANCOMYCIN SERPL-MCNC: 15.6 MG/L
VANCOMYCIN SERPL-MCNC: 22 MG/L
VANCOMYCIN SERPL-MCNC: 25.3 MG/L
VANCOMYCIN SERPL-MCNC: 28.3 MG/L
VANCOMYCIN SERPL-MCNC: 6.1 MG/L
VIT B12 SERPL-MCNC: 1052 PG/ML (ref 193–986)
WBC # BLD AUTO: 10.2 10E9/L (ref 4–11)
WBC # BLD AUTO: 10.8 10E9/L (ref 4–11)
WBC # BLD AUTO: 11.3 10E9/L (ref 4–11)
WBC # BLD AUTO: 13.3 10E9/L (ref 4–11)
WBC # BLD AUTO: 16.3 10E9/L (ref 4–11)
WBC # BLD AUTO: 7.4 10E9/L (ref 4–11)
WBC # BLD AUTO: 7.8 10E9/L (ref 4–11)
WBC # BLD AUTO: 7.8 10E9/L (ref 4–11)
WBC # BLD AUTO: 7.9 10E9/L (ref 4–11)
WBC # BLD AUTO: 8.1 10E9/L (ref 4–11)
WBC # BLD AUTO: 8.7 10E9/L (ref 4–11)
WBC # BLD AUTO: 9.3 10E9/L (ref 4–11)
WBC # BLD AUTO: 9.3 10E9/L (ref 4–11)
WBC # BLD AUTO: 9.5 10E9/L (ref 4–11)
WBC # BLD AUTO: 9.8 10E9/L (ref 4–11)
WBC #/AREA URNS AUTO: 90 /HPF (ref 0–2)

## 2017-01-01 PROCEDURE — A9270 NON-COVERED ITEM OR SERVICE: HCPCS | Mod: GY | Performed by: PHYSICIAN ASSISTANT

## 2017-01-01 PROCEDURE — 85049 AUTOMATED PLATELET COUNT: CPT | Performed by: SURGERY

## 2017-01-01 PROCEDURE — 12000008 ZZH R&B INTERMEDIATE UMMC

## 2017-01-01 PROCEDURE — 36415 COLL VENOUS BLD VENIPUNCTURE: CPT | Performed by: SURGERY

## 2017-01-01 PROCEDURE — 40000141 ZZH STATISTIC PERIPHERAL IV START W/O US GUIDANCE

## 2017-01-01 PROCEDURE — 40000133 ZZH STATISTIC OT WARD VISIT

## 2017-01-01 PROCEDURE — 25000125 ZZHC RX 250: Performed by: PHYSICIAN ASSISTANT

## 2017-01-01 PROCEDURE — 25000128 H RX IP 250 OP 636: Performed by: INTERNAL MEDICINE

## 2017-01-01 PROCEDURE — 25000128 H RX IP 250 OP 636: Performed by: SURGERY

## 2017-01-01 PROCEDURE — A9270 NON-COVERED ITEM OR SERVICE: HCPCS | Mod: GY | Performed by: SURGERY

## 2017-01-01 PROCEDURE — 99211 OFF/OP EST MAY X REQ PHY/QHP: CPT

## 2017-01-01 PROCEDURE — 25000125 ZZHC RX 250: Performed by: NURSE ANESTHETIST, CERTIFIED REGISTERED

## 2017-01-01 PROCEDURE — 36592 COLLECT BLOOD FROM PICC: CPT | Performed by: INTERNAL MEDICINE

## 2017-01-01 PROCEDURE — 00000146 ZZHCL STATISTIC GLUCOSE BY METER IP

## 2017-01-01 PROCEDURE — 93010 ELECTROCARDIOGRAM REPORT: CPT | Performed by: INTERNAL MEDICINE

## 2017-01-01 PROCEDURE — 25000132 ZZH RX MED GY IP 250 OP 250 PS 637: Performed by: CLINICAL NURSE SPECIALIST

## 2017-01-01 PROCEDURE — 25000132 ZZH RX MED GY IP 250 OP 250 PS 637: Mod: GY | Performed by: SURGERY

## 2017-01-01 PROCEDURE — 25800025 ZZH RX 258: Performed by: NURSE PRACTITIONER

## 2017-01-01 PROCEDURE — 86900 BLOOD TYPING SEROLOGIC ABO: CPT | Performed by: SURGERY

## 2017-01-01 PROCEDURE — 97110 THERAPEUTIC EXERCISES: CPT | Mod: GP | Performed by: PHYSICAL THERAPIST

## 2017-01-01 PROCEDURE — 25000125 ZZHC RX 250: Performed by: STUDENT IN AN ORGANIZED HEALTH CARE EDUCATION/TRAINING PROGRAM

## 2017-01-01 PROCEDURE — 25000128 H RX IP 250 OP 636: Performed by: PHYSICIAN ASSISTANT

## 2017-01-01 PROCEDURE — 83605 ASSAY OF LACTIC ACID: CPT | Performed by: PHYSICIAN ASSISTANT

## 2017-01-01 PROCEDURE — 71010 XR CHEST PORT 1 VW: CPT

## 2017-01-01 PROCEDURE — 25000132 ZZH RX MED GY IP 250 OP 250 PS 637: Mod: GY | Performed by: PHYSICIAN ASSISTANT

## 2017-01-01 PROCEDURE — 40000275 ZZH STATISTIC RCP TIME EA 10 MIN

## 2017-01-01 PROCEDURE — 99207 ZZC CDG-MDM COMPONENT: MEETS MODERATE - UP CODED: CPT | Performed by: NURSE PRACTITIONER

## 2017-01-01 PROCEDURE — 27210136 ZZH KIT CATH ARTERIAL EXT SUPPLY

## 2017-01-01 PROCEDURE — 86140 C-REACTIVE PROTEIN: CPT | Performed by: PHYSICIAN ASSISTANT

## 2017-01-01 PROCEDURE — 87086 URINE CULTURE/COLONY COUNT: CPT | Performed by: INTERNAL MEDICINE

## 2017-01-01 PROCEDURE — 93979 VASCULAR STUDY: CPT | Mod: TC

## 2017-01-01 PROCEDURE — 25000132 ZZH RX MED GY IP 250 OP 250 PS 637: Mod: GY | Performed by: NURSE PRACTITIONER

## 2017-01-01 PROCEDURE — 85027 COMPLETE CBC AUTOMATED: CPT | Performed by: SURGERY

## 2017-01-01 PROCEDURE — C1887 CATHETER, GUIDING: HCPCS | Performed by: SURGERY

## 2017-01-01 PROCEDURE — 25000132 ZZH RX MED GY IP 250 OP 250 PS 637: Performed by: SURGERY

## 2017-01-01 PROCEDURE — 86900 BLOOD TYPING SEROLOGIC ABO: CPT | Performed by: PHYSICIAN ASSISTANT

## 2017-01-01 PROCEDURE — 36416 COLLJ CAPILLARY BLOOD SPEC: CPT | Performed by: INTERNAL MEDICINE

## 2017-01-01 PROCEDURE — 12000001 ZZH R&B MED SURG/OB UMMC

## 2017-01-01 PROCEDURE — 84295 ASSAY OF SERUM SODIUM: CPT | Performed by: SURGERY

## 2017-01-01 PROCEDURE — 70547 MR ANGIOGRAPHY NECK W/O DYE: CPT

## 2017-01-01 PROCEDURE — 40000264 ECHO BUBBLE STUDY LIMITED WITH OPTISON

## 2017-01-01 PROCEDURE — A9270 NON-COVERED ITEM OR SERVICE: HCPCS | Mod: GY | Performed by: STUDENT IN AN ORGANIZED HEALTH CARE EDUCATION/TRAINING PROGRAM

## 2017-01-01 PROCEDURE — S0077 INJECTION, CLINDAMYCIN PHOSP: HCPCS | Performed by: SURGERY

## 2017-01-01 PROCEDURE — 27210995 ZZH RX 272: Performed by: STUDENT IN AN ORGANIZED HEALTH CARE EDUCATION/TRAINING PROGRAM

## 2017-01-01 PROCEDURE — 80053 COMPREHEN METABOLIC PANEL: CPT | Performed by: PHYSICIAN ASSISTANT

## 2017-01-01 PROCEDURE — 40000225 ZZH STATISTIC SLP WARD VISIT: Performed by: SPEECH-LANGUAGE PATHOLOGIST

## 2017-01-01 PROCEDURE — 97535 SELF CARE MNGMENT TRAINING: CPT | Mod: GO | Performed by: OCCUPATIONAL THERAPIST

## 2017-01-01 PROCEDURE — 25000132 ZZH RX MED GY IP 250 OP 250 PS 637: Performed by: STUDENT IN AN ORGANIZED HEALTH CARE EDUCATION/TRAINING PROGRAM

## 2017-01-01 PROCEDURE — 80202 ASSAY OF VANCOMYCIN: CPT | Performed by: PHYSICIAN ASSISTANT

## 2017-01-01 PROCEDURE — 86901 BLOOD TYPING SEROLOGIC RH(D): CPT | Performed by: SURGERY

## 2017-01-01 PROCEDURE — 99207 ZZC APP CREDIT; MD BILLING SHARED VISIT: CPT | Performed by: PHYSICIAN ASSISTANT

## 2017-01-01 PROCEDURE — 84145 PROCALCITONIN (PCT): CPT | Performed by: PHYSICIAN ASSISTANT

## 2017-01-01 PROCEDURE — 80048 BASIC METABOLIC PNL TOTAL CA: CPT | Performed by: PHYSICIAN ASSISTANT

## 2017-01-01 PROCEDURE — 86850 RBC ANTIBODY SCREEN: CPT | Performed by: SURGERY

## 2017-01-01 PROCEDURE — 99232 SBSQ HOSP IP/OBS MODERATE 35: CPT | Performed by: INTERNAL MEDICINE

## 2017-01-01 PROCEDURE — 36415 COLL VENOUS BLD VENIPUNCTURE: CPT | Performed by: PHYSICIAN ASSISTANT

## 2017-01-01 PROCEDURE — 25000132 ZZH RX MED GY IP 250 OP 250 PS 637: Mod: GY | Performed by: STUDENT IN AN ORGANIZED HEALTH CARE EDUCATION/TRAINING PROGRAM

## 2017-01-01 PROCEDURE — 82803 BLOOD GASES ANY COMBINATION: CPT | Performed by: SURGERY

## 2017-01-01 PROCEDURE — 40000802 ZZH SITE CHECK

## 2017-01-01 PROCEDURE — 27210995 ZZH RX 272: Performed by: SURGERY

## 2017-01-01 PROCEDURE — 93005 ELECTROCARDIOGRAM TRACING: CPT

## 2017-01-01 PROCEDURE — 97530 THERAPEUTIC ACTIVITIES: CPT | Mod: GP

## 2017-01-01 PROCEDURE — C1768 GRAFT, VASCULAR: HCPCS | Performed by: SURGERY

## 2017-01-01 PROCEDURE — 40000193 ZZH STATISTIC PT WARD VISIT

## 2017-01-01 PROCEDURE — 80048 BASIC METABOLIC PNL TOTAL CA: CPT | Performed by: CLINICAL NURSE SPECIALIST

## 2017-01-01 PROCEDURE — 86901 BLOOD TYPING SEROLOGIC RH(D): CPT | Performed by: PHYSICIAN ASSISTANT

## 2017-01-01 PROCEDURE — 97162 PT EVAL MOD COMPLEX 30 MIN: CPT | Mod: GP

## 2017-01-01 PROCEDURE — 93325 DOPPLER ECHO COLOR FLOW MAPG: CPT | Mod: 26 | Performed by: INTERNAL MEDICINE

## 2017-01-01 PROCEDURE — 93308 TTE F-UP OR LMTD: CPT | Mod: 26 | Performed by: INTERNAL MEDICINE

## 2017-01-01 PROCEDURE — C1769 GUIDE WIRE: HCPCS | Performed by: SURGERY

## 2017-01-01 PROCEDURE — 40000225 ZZH STATISTIC SLP WARD VISIT

## 2017-01-01 PROCEDURE — 81001 URINALYSIS AUTO W/SCOPE: CPT | Performed by: PHYSICIAN ASSISTANT

## 2017-01-01 PROCEDURE — 34300033 ZZH RX 343: Performed by: INTERNAL MEDICINE

## 2017-01-01 PROCEDURE — 87040 BLOOD CULTURE FOR BACTERIA: CPT | Performed by: PHYSICIAN ASSISTANT

## 2017-01-01 PROCEDURE — 85652 RBC SED RATE AUTOMATED: CPT | Performed by: PHYSICIAN ASSISTANT

## 2017-01-01 PROCEDURE — A9270 NON-COVERED ITEM OR SERVICE: HCPCS | Mod: GY | Performed by: ORTHOPAEDIC SURGERY

## 2017-01-01 PROCEDURE — 74176 CT ABD & PELVIS W/O CONTRAST: CPT

## 2017-01-01 PROCEDURE — 25000132 ZZH RX MED GY IP 250 OP 250 PS 637: Mod: GY | Performed by: CLINICAL NURSE SPECIALIST

## 2017-01-01 PROCEDURE — 84439 ASSAY OF FREE THYROXINE: CPT | Performed by: PHYSICIAN ASSISTANT

## 2017-01-01 PROCEDURE — 97530 THERAPEUTIC ACTIVITIES: CPT | Mod: GP | Performed by: PHYSICAL THERAPIST

## 2017-01-01 PROCEDURE — 25000128 H RX IP 250 OP 636: Performed by: NURSE PRACTITIONER

## 2017-01-01 PROCEDURE — 99233 SBSQ HOSP IP/OBS HIGH 50: CPT | Performed by: INTERNAL MEDICINE

## 2017-01-01 PROCEDURE — 83735 ASSAY OF MAGNESIUM: CPT | Performed by: SURGERY

## 2017-01-01 PROCEDURE — 80048 BASIC METABOLIC PNL TOTAL CA: CPT | Performed by: SURGERY

## 2017-01-01 PROCEDURE — 25000128 H RX IP 250 OP 636: Performed by: NURSE ANESTHETIST, CERTIFIED REGISTERED

## 2017-01-01 PROCEDURE — 84443 ASSAY THYROID STIM HORMONE: CPT | Performed by: PHYSICIAN ASSISTANT

## 2017-01-01 PROCEDURE — 25000125 ZZHC RX 250: Performed by: SURGERY

## 2017-01-01 PROCEDURE — 25000128 H RX IP 250 OP 636: Performed by: STUDENT IN AN ORGANIZED HEALTH CARE EDUCATION/TRAINING PROGRAM

## 2017-01-01 PROCEDURE — 40000556 ZZH STATISTIC PERIPHERAL IV START W US GUIDANCE

## 2017-01-01 PROCEDURE — 40000193 ZZH STATISTIC PT WARD VISIT: Performed by: PHYSICAL THERAPIST

## 2017-01-01 PROCEDURE — 40000275 ZZH STATISTIC RCP TIME EA 10 MIN: Performed by: OPTOMETRIST

## 2017-01-01 PROCEDURE — 25000131 ZZH RX MED GY IP 250 OP 636 PS 637: Mod: GY | Performed by: PHYSICIAN ASSISTANT

## 2017-01-01 PROCEDURE — 80202 ASSAY OF VANCOMYCIN: CPT | Performed by: INTERNAL MEDICINE

## 2017-01-01 PROCEDURE — 87641 MR-STAPH DNA AMP PROBE: CPT | Performed by: SURGERY

## 2017-01-01 PROCEDURE — 99207 ZZC APP CREDIT; MD BILLING SHARED VISIT: CPT | Performed by: NURSE PRACTITIONER

## 2017-01-01 PROCEDURE — 36415 COLL VENOUS BLD VENIPUNCTURE: CPT | Performed by: CLINICAL NURSE SPECIALIST

## 2017-01-01 PROCEDURE — 36592 COLLECT BLOOD FROM PICC: CPT | Performed by: PHYSICIAN ASSISTANT

## 2017-01-01 PROCEDURE — 25000132 ZZH RX MED GY IP 250 OP 250 PS 637: Mod: GY | Performed by: ORTHOPAEDIC SURGERY

## 2017-01-01 PROCEDURE — 83690 ASSAY OF LIPASE: CPT | Performed by: PHYSICIAN ASSISTANT

## 2017-01-01 PROCEDURE — 83036 HEMOGLOBIN GLYCOSYLATED A1C: CPT | Performed by: PHYSICIAN ASSISTANT

## 2017-01-01 PROCEDURE — 25000132 ZZH RX MED GY IP 250 OP 250 PS 637: Performed by: PHYSICIAN ASSISTANT

## 2017-01-01 PROCEDURE — 82607 VITAMIN B-12: CPT | Performed by: PHYSICIAN ASSISTANT

## 2017-01-01 PROCEDURE — 85027 COMPLETE CBC AUTOMATED: CPT | Performed by: PHYSICIAN ASSISTANT

## 2017-01-01 PROCEDURE — 83735 ASSAY OF MAGNESIUM: CPT | Performed by: PHYSICIAN ASSISTANT

## 2017-01-01 PROCEDURE — 99212 OFFICE O/P EST SF 10 MIN: CPT

## 2017-01-01 PROCEDURE — 86140 C-REACTIVE PROTEIN: CPT | Performed by: INTERNAL MEDICINE

## 2017-01-01 PROCEDURE — 25000131 ZZH RX MED GY IP 250 OP 636 PS 637: Performed by: SURGERY

## 2017-01-01 PROCEDURE — A9270 NON-COVERED ITEM OR SERVICE: HCPCS | Mod: GY | Performed by: CLINICAL NURSE SPECIALIST

## 2017-01-01 PROCEDURE — 37000008 ZZH ANESTHESIA TECHNICAL FEE, 1ST 30 MIN: Performed by: SURGERY

## 2017-01-01 PROCEDURE — 93005 ELECTROCARDIOGRAM TRACING: CPT | Performed by: OPTOMETRIST

## 2017-01-01 PROCEDURE — 84132 ASSAY OF SERUM POTASSIUM: CPT | Performed by: SURGERY

## 2017-01-01 PROCEDURE — 84100 ASSAY OF PHOSPHORUS: CPT | Performed by: SURGERY

## 2017-01-01 PROCEDURE — 40000558 ZZH STATISTIC CVC DRESSING CHANGE

## 2017-01-01 PROCEDURE — 36000072 ZZH SURGERY LEVEL 5 W FLUORO 1ST 30 MIN - UMMC: Performed by: SURGERY

## 2017-01-01 PROCEDURE — 92526 ORAL FUNCTION THERAPY: CPT | Mod: GN | Performed by: SPEECH-LANGUAGE PATHOLOGIST

## 2017-01-01 PROCEDURE — 93308 TTE F-UP OR LMTD: CPT

## 2017-01-01 PROCEDURE — 71020 XR CHEST 2 VW: CPT

## 2017-01-01 PROCEDURE — 27210794 ZZH OR GENERAL SUPPLY STERILE: Performed by: SURGERY

## 2017-01-01 PROCEDURE — 36569 INSJ PICC 5 YR+ W/O IMAGING: CPT

## 2017-01-01 PROCEDURE — A9552 F18 FDG: HCPCS | Performed by: INTERNAL MEDICINE

## 2017-01-01 PROCEDURE — 25500064 ZZH RX 255 OP 636: Performed by: SURGERY

## 2017-01-01 PROCEDURE — 85730 THROMBOPLASTIN TIME PARTIAL: CPT | Performed by: SURGERY

## 2017-01-01 PROCEDURE — 86850 RBC ANTIBODY SCREEN: CPT | Performed by: PHYSICIAN ASSISTANT

## 2017-01-01 PROCEDURE — 97110 THERAPEUTIC EXERCISES: CPT | Mod: GP

## 2017-01-01 PROCEDURE — 27211024 ZZHC OR SUPPLY OTHER OPNP: Performed by: SURGERY

## 2017-01-01 PROCEDURE — 04V03DZ RESTRICTION OF ABDOMINAL AORTA WITH INTRALUMINAL DEVICE, PERCUTANEOUS APPROACH: ICD-10-PCS | Performed by: SURGERY

## 2017-01-01 PROCEDURE — 85610 PROTHROMBIN TIME: CPT | Performed by: SURGERY

## 2017-01-01 PROCEDURE — 99233 SBSQ HOSP IP/OBS HIGH 50: CPT | Performed by: NURSE PRACTITIONER

## 2017-01-01 PROCEDURE — 76770 US EXAM ABDO BACK WALL COMP: CPT

## 2017-01-01 PROCEDURE — 40000169 ZZH STATISTIC PRE-PROCEDURE ASSESSMENT I: Performed by: SURGERY

## 2017-01-01 PROCEDURE — 85027 COMPLETE CBC AUTOMATED: CPT | Performed by: CLINICAL NURSE SPECIALIST

## 2017-01-01 PROCEDURE — 40000014 ZZH STATISTIC ARTERIAL MONITORING DAILY

## 2017-01-01 PROCEDURE — 93321 DOPPLER ECHO F-UP/LMTD STD: CPT | Mod: 26 | Performed by: INTERNAL MEDICINE

## 2017-01-01 PROCEDURE — 36415 COLL VENOUS BLD VENIPUNCTURE: CPT | Performed by: INTERNAL MEDICINE

## 2017-01-01 PROCEDURE — 20000004 ZZH R&B ICU UMMC

## 2017-01-01 PROCEDURE — 83605 ASSAY OF LACTIC ACID: CPT | Performed by: SURGERY

## 2017-01-01 PROCEDURE — 85610 PROTHROMBIN TIME: CPT | Performed by: PHYSICIAN ASSISTANT

## 2017-01-01 PROCEDURE — 84484 ASSAY OF TROPONIN QUANT: CPT | Performed by: SURGERY

## 2017-01-01 PROCEDURE — 25000125 ZZHC RX 250: Performed by: NURSE PRACTITIONER

## 2017-01-01 PROCEDURE — 86923 COMPATIBILITY TEST ELECTRIC: CPT | Performed by: SURGERY

## 2017-01-01 PROCEDURE — 27210186 ZZH KIT OPEN ENDED SINGLE LUMEN

## 2017-01-01 PROCEDURE — 82330 ASSAY OF CALCIUM: CPT | Performed by: SURGERY

## 2017-01-01 PROCEDURE — 25000128 H RX IP 250 OP 636: Performed by: CLINICAL NURSE SPECIALIST

## 2017-01-01 PROCEDURE — 87640 STAPH A DNA AMP PROBE: CPT | Performed by: SURGERY

## 2017-01-01 PROCEDURE — 92610 EVALUATE SWALLOWING FUNCTION: CPT | Mod: GN

## 2017-01-01 PROCEDURE — 85347 COAGULATION TIME ACTIVATED: CPT

## 2017-01-01 PROCEDURE — 85027 COMPLETE CBC AUTOMATED: CPT | Performed by: INTERNAL MEDICINE

## 2017-01-01 PROCEDURE — 93880 EXTRACRANIAL BILAT STUDY: CPT

## 2017-01-01 PROCEDURE — 37000009 ZZH ANESTHESIA TECHNICAL FEE, EACH ADDTL 15 MIN: Performed by: SURGERY

## 2017-01-01 PROCEDURE — 99356 ZZC PROLONGED SERV,INPATIENT,1ST HR: CPT | Performed by: CLINICAL NURSE SPECIALIST

## 2017-01-01 PROCEDURE — S0166 INJ OLANZAPINE 2.5MG: HCPCS | Performed by: INTERNAL MEDICINE

## 2017-01-01 PROCEDURE — A9270 NON-COVERED ITEM OR SERVICE: HCPCS | Mod: GY | Performed by: NURSE PRACTITIONER

## 2017-01-01 PROCEDURE — P9041 ALBUMIN (HUMAN),5%, 50ML: HCPCS | Performed by: NURSE ANESTHETIST, CERTIFIED REGISTERED

## 2017-01-01 PROCEDURE — 99239 HOSP IP/OBS DSCHRG MGMT >30: CPT | Performed by: NURSE PRACTITIONER

## 2017-01-01 PROCEDURE — 99223 1ST HOSP IP/OBS HIGH 75: CPT | Mod: AI | Performed by: INTERNAL MEDICINE

## 2017-01-01 PROCEDURE — 97116 GAIT TRAINING THERAPY: CPT | Mod: GP

## 2017-01-01 PROCEDURE — 70498 CT ANGIOGRAPHY NECK: CPT

## 2017-01-01 PROCEDURE — 97535 SELF CARE MNGMENT TRAINING: CPT | Mod: GO

## 2017-01-01 PROCEDURE — G0378 HOSPITAL OBSERVATION PER HR: HCPCS

## 2017-01-01 PROCEDURE — C1874 STENT, COATED/COV W/DEL SYS: HCPCS | Performed by: SURGERY

## 2017-01-01 PROCEDURE — 83605 ASSAY OF LACTIC ACID: CPT | Performed by: INTERNAL MEDICINE

## 2017-01-01 PROCEDURE — 40000003 ZZH STATISTIC IR STAFF TIME IN THE OR

## 2017-01-01 PROCEDURE — 99233 SBSQ HOSP IP/OBS HIGH 50: CPT | Performed by: CLINICAL NURSE SPECIALIST

## 2017-01-01 PROCEDURE — 94640 AIRWAY INHALATION TREATMENT: CPT

## 2017-01-01 PROCEDURE — 92610 EVALUATE SWALLOWING FUNCTION: CPT | Mod: GN | Performed by: SPEECH-LANGUAGE PATHOLOGIST

## 2017-01-01 PROCEDURE — P9016 RBC LEUKOCYTES REDUCED: HCPCS | Performed by: SURGERY

## 2017-01-01 PROCEDURE — 80048 BASIC METABOLIC PNL TOTAL CA: CPT | Performed by: INTERNAL MEDICINE

## 2017-01-01 PROCEDURE — 36620 INSERTION CATHETER ARTERY: CPT

## 2017-01-01 PROCEDURE — 84145 PROCALCITONIN (PCT): CPT | Performed by: INTERNAL MEDICINE

## 2017-01-01 PROCEDURE — 82947 ASSAY GLUCOSE BLOOD QUANT: CPT | Performed by: SURGERY

## 2017-01-01 PROCEDURE — 25000125 ZZHC RX 250: Performed by: INTERNAL MEDICINE

## 2017-01-01 PROCEDURE — 36000070 ZZH SURGERY LEVEL 5 EA 15 ADDTL MIN - UMMC: Performed by: SURGERY

## 2017-01-01 PROCEDURE — 78816 PET IMAGE W/CT FULL BODY: CPT | Mod: PI

## 2017-01-01 PROCEDURE — C1894 INTRO/SHEATH, NON-LASER: HCPCS | Performed by: SURGERY

## 2017-01-01 PROCEDURE — C1725 CATH, TRANSLUMIN NON-LASER: HCPCS | Performed by: SURGERY

## 2017-01-01 PROCEDURE — S0020 INJECTION, BUPIVICAINE HYDRO: HCPCS | Performed by: SURGERY

## 2017-01-01 PROCEDURE — 99223 1ST HOSP IP/OBS HIGH 75: CPT | Performed by: CLINICAL NURSE SPECIALIST

## 2017-01-01 PROCEDURE — 97165 OT EVAL LOW COMPLEX 30 MIN: CPT | Mod: GO

## 2017-01-01 PROCEDURE — 40000133 ZZH STATISTIC OT WARD VISIT: Performed by: OCCUPATIONAL THERAPIST

## 2017-01-01 DEVICE — IMPLANTABLE DEVICE: Type: IMPLANTABLE DEVICE | Site: AORTA | Status: FUNCTIONAL

## 2017-01-01 RX ORDER — DEXTROSE MONOHYDRATE 25 G/50ML
25-50 INJECTION, SOLUTION INTRAVENOUS
Status: DISCONTINUED | OUTPATIENT
Start: 2017-01-01 | End: 2017-01-01 | Stop reason: HOSPADM

## 2017-01-01 RX ORDER — ALPRAZOLAM 0.5 MG
0.5 TABLET ORAL
Qty: 45 TABLET | Refills: 0 | Status: ON HOLD | OUTPATIENT
Start: 2017-01-01 | End: 2017-01-01

## 2017-01-01 RX ORDER — SODIUM CHLORIDE, SODIUM LACTATE, POTASSIUM CHLORIDE, CALCIUM CHLORIDE 600; 310; 30; 20 MG/100ML; MG/100ML; MG/100ML; MG/100ML
INJECTION, SOLUTION INTRAVENOUS CONTINUOUS
Status: DISCONTINUED | OUTPATIENT
Start: 2017-01-01 | End: 2017-01-01

## 2017-01-01 RX ORDER — AMOXICILLIN 250 MG
2 CAPSULE ORAL 2 TIMES DAILY PRN
Status: DISCONTINUED | OUTPATIENT
Start: 2017-01-01 | End: 2017-01-01 | Stop reason: HOSPADM

## 2017-01-01 RX ORDER — HYDROMORPHONE HCL/0.9% NACL/PF 0.2MG/0.2
0.2 SYRINGE (ML) INTRAVENOUS EVERY 4 HOURS PRN
Status: DISCONTINUED | OUTPATIENT
Start: 2017-01-01 | End: 2017-01-01

## 2017-01-01 RX ORDER — HYDROMORPHONE HYDROCHLORIDE 1 MG/ML
0.3 INJECTION, SOLUTION INTRAMUSCULAR; INTRAVENOUS; SUBCUTANEOUS EVERY 4 HOURS PRN
Status: DISCONTINUED | OUTPATIENT
Start: 2017-01-01 | End: 2017-01-01

## 2017-01-01 RX ORDER — HYDROMORPHONE HYDROCHLORIDE 1 MG/ML
0.5 INJECTION, SOLUTION INTRAMUSCULAR; INTRAVENOUS; SUBCUTANEOUS EVERY 4 HOURS PRN
Status: DISCONTINUED | OUTPATIENT
Start: 2017-01-01 | End: 2017-01-01

## 2017-01-01 RX ORDER — OLANZAPINE 10 MG/2ML
5 INJECTION, POWDER, FOR SOLUTION INTRAMUSCULAR
Status: DISCONTINUED | OUTPATIENT
Start: 2017-01-01 | End: 2017-01-01 | Stop reason: HOSPADM

## 2017-01-01 RX ORDER — MULTIVITAMIN,THERAPEUTIC
1 TABLET ORAL DAILY
Status: DISCONTINUED | OUTPATIENT
Start: 2017-01-01 | End: 2017-01-01 | Stop reason: HOSPADM

## 2017-01-01 RX ORDER — SCOLOPAMINE TRANSDERMAL SYSTEM 1 MG/1
1 PATCH, EXTENDED RELEASE TRANSDERMAL
Status: DISCONTINUED | OUTPATIENT
Start: 2017-01-01 | End: 2017-01-01

## 2017-01-01 RX ORDER — OLANZAPINE 5 MG/1
5 TABLET, ORALLY DISINTEGRATING ORAL 2 TIMES DAILY PRN
Status: DISCONTINUED | OUTPATIENT
Start: 2017-01-01 | End: 2017-01-01

## 2017-01-01 RX ORDER — MAGNESIUM SULFATE HEPTAHYDRATE 40 MG/ML
4 INJECTION, SOLUTION INTRAVENOUS EVERY 4 HOURS PRN
Status: DISCONTINUED | OUTPATIENT
Start: 2017-01-01 | End: 2017-01-01 | Stop reason: HOSPADM

## 2017-01-01 RX ORDER — HYDROMORPHONE HYDROCHLORIDE 5 MG/5ML
1-2 SOLUTION ORAL
Status: DISCONTINUED | OUTPATIENT
Start: 2017-01-01 | End: 2017-01-01 | Stop reason: HOSPADM

## 2017-01-01 RX ORDER — EPHEDRINE SULFATE 50 MG/ML
INJECTION, SOLUTION INTRAMUSCULAR; INTRAVENOUS; SUBCUTANEOUS PRN
Status: DISCONTINUED | OUTPATIENT
Start: 2017-01-01 | End: 2017-01-01

## 2017-01-01 RX ORDER — ONDANSETRON 4 MG/1
4 TABLET, ORALLY DISINTEGRATING ORAL EVERY 6 HOURS PRN
Qty: 120 TABLET | DISCHARGE
Start: 2017-01-01

## 2017-01-01 RX ORDER — ALBUTEROL SULFATE 90 UG/1
2 AEROSOL, METERED RESPIRATORY (INHALATION) 4 TIMES DAILY
Status: DISCONTINUED | OUTPATIENT
Start: 2017-01-01 | End: 2017-01-01 | Stop reason: HOSPADM

## 2017-01-01 RX ORDER — POTASSIUM CHLORIDE 29.8 MG/ML
20 INJECTION INTRAVENOUS
Status: DISCONTINUED | OUTPATIENT
Start: 2017-01-01 | End: 2017-01-01 | Stop reason: CLARIF

## 2017-01-01 RX ORDER — ROSUVASTATIN CALCIUM 20 MG/1
40 TABLET, COATED ORAL DAILY
Status: DISCONTINUED | OUTPATIENT
Start: 2017-01-01 | End: 2017-01-01 | Stop reason: HOSPADM

## 2017-01-01 RX ORDER — POTASSIUM CHLORIDE 750 MG/1
20-40 TABLET, EXTENDED RELEASE ORAL
Status: DISCONTINUED | OUTPATIENT
Start: 2017-01-01 | End: 2017-01-01 | Stop reason: HOSPADM

## 2017-01-01 RX ORDER — HEPARIN SODIUM 10000 [USP'U]/100ML
0-3500 INJECTION, SOLUTION INTRAVENOUS CONTINUOUS
Status: DISCONTINUED | OUTPATIENT
Start: 2017-01-01 | End: 2017-01-01

## 2017-01-01 RX ORDER — POTASSIUM CL/LIDO/0.9 % NACL 10MEQ/0.1L
10 INTRAVENOUS SOLUTION, PIGGYBACK (ML) INTRAVENOUS
Status: DISCONTINUED | OUTPATIENT
Start: 2017-01-01 | End: 2017-01-01

## 2017-01-01 RX ORDER — ALBUMIN, HUMAN INJ 5% 5 %
SOLUTION INTRAVENOUS CONTINUOUS PRN
Status: DISCONTINUED | OUTPATIENT
Start: 2017-01-01 | End: 2017-01-01

## 2017-01-01 RX ORDER — ONDANSETRON 2 MG/ML
4 INJECTION INTRAMUSCULAR; INTRAVENOUS EVERY 30 MIN PRN
Status: DISCONTINUED | OUTPATIENT
Start: 2017-01-01 | End: 2017-01-01 | Stop reason: HOSPADM

## 2017-01-01 RX ORDER — SODIUM CHLORIDE 9 MG/ML
INJECTION, SOLUTION INTRAVENOUS CONTINUOUS PRN
Status: DISCONTINUED | OUTPATIENT
Start: 2017-01-01 | End: 2017-01-01

## 2017-01-01 RX ORDER — POTASSIUM CHLORIDE 29.8 MG/ML
20 INJECTION INTRAVENOUS
Status: DISCONTINUED | OUTPATIENT
Start: 2017-01-01 | End: 2017-01-01 | Stop reason: RX

## 2017-01-01 RX ORDER — LIDOCAINE 50 MG/G
2 PATCH TOPICAL
Status: DISCONTINUED | OUTPATIENT
Start: 2017-01-01 | End: 2017-01-01 | Stop reason: HOSPADM

## 2017-01-01 RX ORDER — ONDANSETRON 2 MG/ML
4-8 INJECTION INTRAMUSCULAR; INTRAVENOUS EVERY 6 HOURS PRN
Status: DISCONTINUED | OUTPATIENT
Start: 2017-01-01 | End: 2017-01-01 | Stop reason: HOSPADM

## 2017-01-01 RX ORDER — ASPIRIN 81 MG/1
81 TABLET, CHEWABLE ORAL DAILY
Status: DISCONTINUED | OUTPATIENT
Start: 2017-01-01 | End: 2017-01-01

## 2017-01-01 RX ORDER — SCOLOPAMINE TRANSDERMAL SYSTEM 1 MG/1
1 PATCH, EXTENDED RELEASE TRANSDERMAL
Status: DISCONTINUED | OUTPATIENT
Start: 2017-01-01 | End: 2017-01-01 | Stop reason: HOSPADM

## 2017-01-01 RX ORDER — HALOPERIDOL 5 MG/ML
0.5 INJECTION INTRAMUSCULAR EVERY 6 HOURS PRN
Status: DISCONTINUED | OUTPATIENT
Start: 2017-01-01 | End: 2017-01-01

## 2017-01-01 RX ORDER — ACETAMINOPHEN 650 MG/1
650 SUPPOSITORY RECTAL EVERY 4 HOURS PRN
Status: DISCONTINUED | OUTPATIENT
Start: 2017-01-01 | End: 2017-01-01

## 2017-01-01 RX ORDER — POLYETHYLENE GLYCOL 3350 17 G/17G
17 POWDER, FOR SOLUTION ORAL 2 TIMES DAILY PRN
Status: DISCONTINUED | OUTPATIENT
Start: 2017-01-01 | End: 2017-01-01 | Stop reason: HOSPADM

## 2017-01-01 RX ORDER — LANOLIN ALCOHOL/MO/W.PET/CERES
3 CREAM (GRAM) TOPICAL
Qty: 30 TABLET | Refills: 0 | Status: ON HOLD | OUTPATIENT
Start: 2017-01-01 | End: 2017-01-01

## 2017-01-01 RX ORDER — OLANZAPINE 10 MG/2ML
5 INJECTION, POWDER, FOR SOLUTION INTRAMUSCULAR ONCE
Status: COMPLETED | OUTPATIENT
Start: 2017-01-01 | End: 2017-01-01

## 2017-01-01 RX ORDER — POTASSIUM CHLORIDE 1.5 G/1.58G
20-40 POWDER, FOR SOLUTION ORAL
Status: DISCONTINUED | OUTPATIENT
Start: 2017-01-01 | End: 2017-01-01 | Stop reason: HOSPADM

## 2017-01-01 RX ORDER — LANOLIN ALCOHOL/MO/W.PET/CERES
3 CREAM (GRAM) TOPICAL AT BEDTIME
Qty: 30 TABLET | Refills: 0 | DISCHARGE
Start: 2017-01-01

## 2017-01-01 RX ORDER — HYDROMORPHONE HYDROCHLORIDE 1 MG/ML
.3-.5 INJECTION, SOLUTION INTRAMUSCULAR; INTRAVENOUS; SUBCUTANEOUS EVERY 4 HOURS PRN
Status: DISCONTINUED | OUTPATIENT
Start: 2017-01-01 | End: 2017-01-01

## 2017-01-01 RX ORDER — ONDANSETRON 2 MG/ML
4 INJECTION INTRAMUSCULAR; INTRAVENOUS ONCE
Status: COMPLETED | OUTPATIENT
Start: 2017-01-01 | End: 2017-01-01

## 2017-01-01 RX ORDER — LIDOCAINE 50 MG/G
1 PATCH TOPICAL
Status: DISCONTINUED | OUTPATIENT
Start: 2017-01-01 | End: 2017-01-01 | Stop reason: CLARIF

## 2017-01-01 RX ORDER — BISACODYL 5 MG
15 TABLET, DELAYED RELEASE (ENTERIC COATED) ORAL DAILY PRN
Status: DISCONTINUED | OUTPATIENT
Start: 2017-01-01 | End: 2017-01-01 | Stop reason: HOSPADM

## 2017-01-01 RX ORDER — NALOXONE HYDROCHLORIDE 0.4 MG/ML
.1-.4 INJECTION, SOLUTION INTRAMUSCULAR; INTRAVENOUS; SUBCUTANEOUS
Status: DISCONTINUED | OUTPATIENT
Start: 2017-01-01 | End: 2017-01-01 | Stop reason: HOSPADM

## 2017-01-01 RX ORDER — HYDROMORPHONE HYDROCHLORIDE 1 MG/ML
0.25 INJECTION, SOLUTION INTRAMUSCULAR; INTRAVENOUS; SUBCUTANEOUS ONCE
Status: COMPLETED | OUTPATIENT
Start: 2017-01-01 | End: 2017-01-01

## 2017-01-01 RX ORDER — BUPIVACAINE HYDROCHLORIDE 2.5 MG/ML
INJECTION, SOLUTION INFILTRATION; PERINEURAL PRN
Status: DISCONTINUED | OUTPATIENT
Start: 2017-01-01 | End: 2017-01-01

## 2017-01-01 RX ORDER — AMOXICILLIN 250 MG
2 CAPSULE ORAL DAILY
Status: ON HOLD | COMMUNITY
End: 2017-01-01

## 2017-01-01 RX ORDER — ACETAMINOPHEN 325 MG/1
325-650 TABLET ORAL EVERY 4 HOURS PRN
Status: DISCONTINUED | OUTPATIENT
Start: 2017-01-01 | End: 2017-01-01

## 2017-01-01 RX ORDER — SODIUM CHLORIDE 9 MG/ML
INJECTION, SOLUTION INTRAVENOUS CONTINUOUS
Status: DISCONTINUED | OUTPATIENT
Start: 2017-01-01 | End: 2017-01-01

## 2017-01-01 RX ORDER — LIDOCAINE 40 MG/G
CREAM TOPICAL
Status: DISCONTINUED | OUTPATIENT
Start: 2017-01-01 | End: 2017-01-01 | Stop reason: HOSPADM

## 2017-01-01 RX ORDER — DEXTROSE MONOHYDRATE, SODIUM CHLORIDE, AND POTASSIUM CHLORIDE 50; 1.49; 4.5 G/1000ML; G/1000ML; G/1000ML
INJECTION, SOLUTION INTRAVENOUS CONTINUOUS
Status: DISCONTINUED | OUTPATIENT
Start: 2017-01-01 | End: 2017-01-01

## 2017-01-01 RX ORDER — NALOXONE HYDROCHLORIDE 0.4 MG/ML
.1-.4 INJECTION, SOLUTION INTRAMUSCULAR; INTRAVENOUS; SUBCUTANEOUS
Status: DISCONTINUED | OUTPATIENT
Start: 2017-01-01 | End: 2017-01-01

## 2017-01-01 RX ORDER — NICOTINE POLACRILEX 4 MG
15-30 LOZENGE BUCCAL
Status: DISCONTINUED | OUTPATIENT
Start: 2017-01-01 | End: 2017-01-01 | Stop reason: HOSPADM

## 2017-01-01 RX ORDER — OLANZAPINE 5 MG/1
5 TABLET, ORALLY DISINTEGRATING ORAL 2 TIMES DAILY PRN
Status: DISCONTINUED | OUTPATIENT
Start: 2017-01-01 | End: 2017-01-01 | Stop reason: HOSPADM

## 2017-01-01 RX ORDER — ALPRAZOLAM 0.5 MG
0.25 TABLET ORAL
Status: ON HOLD | COMMUNITY
End: 2017-01-01

## 2017-01-01 RX ORDER — ONDANSETRON 2 MG/ML
4 INJECTION INTRAMUSCULAR; INTRAVENOUS EVERY 6 HOURS PRN
Status: DISCONTINUED | OUTPATIENT
Start: 2017-01-01 | End: 2017-01-01

## 2017-01-01 RX ORDER — ALBUTEROL SULFATE 0.83 MG/ML
2.5 SOLUTION RESPIRATORY (INHALATION) EVERY 6 HOURS PRN
Status: DISCONTINUED | OUTPATIENT
Start: 2017-01-01 | End: 2017-01-01 | Stop reason: HOSPADM

## 2017-01-01 RX ORDER — ONDANSETRON 4 MG/1
4 TABLET, ORALLY DISINTEGRATING ORAL EVERY 30 MIN PRN
Status: DISCONTINUED | OUTPATIENT
Start: 2017-01-01 | End: 2017-01-01 | Stop reason: HOSPADM

## 2017-01-01 RX ORDER — HYDROMORPHONE HYDROCHLORIDE 2 MG/1
2 TABLET ORAL
Status: DISCONTINUED | OUTPATIENT
Start: 2017-01-01 | End: 2017-01-01

## 2017-01-01 RX ORDER — HEPARIN SODIUM,PORCINE 10 UNIT/ML
5-10 VIAL (ML) INTRAVENOUS
Status: DISCONTINUED | OUTPATIENT
Start: 2017-01-01 | End: 2017-01-01 | Stop reason: HOSPADM

## 2017-01-01 RX ORDER — LIDOCAINE 50 MG/G
2 PATCH TOPICAL EVERY 24 HOURS
Qty: 30 PATCH | Refills: 0 | Status: ON HOLD | OUTPATIENT
Start: 2017-01-01 | End: 2017-01-01

## 2017-01-01 RX ORDER — SCOLOPAMINE TRANSDERMAL SYSTEM 1 MG/1
1 PATCH, EXTENDED RELEASE TRANSDERMAL
Qty: 30 PATCH | Refills: 0 | Status: ON HOLD | OUTPATIENT
Start: 2017-01-01 | End: 2017-01-01

## 2017-01-01 RX ORDER — FENTANYL CITRATE 50 UG/ML
INJECTION, SOLUTION INTRAMUSCULAR; INTRAVENOUS PRN
Status: DISCONTINUED | OUTPATIENT
Start: 2017-01-01 | End: 2017-01-01

## 2017-01-01 RX ORDER — LANOLIN ALCOHOL/MO/W.PET/CERES
3 CREAM (GRAM) TOPICAL
Status: DISCONTINUED | OUTPATIENT
Start: 2017-01-01 | End: 2017-01-01 | Stop reason: HOSPADM

## 2017-01-01 RX ORDER — HALOPERIDOL 5 MG/ML
1 INJECTION INTRAMUSCULAR EVERY 6 HOURS PRN
Status: DISCONTINUED | OUTPATIENT
Start: 2017-01-01 | End: 2017-01-01

## 2017-01-01 RX ORDER — PROPOFOL 10 MG/ML
INJECTION, EMULSION INTRAVENOUS CONTINUOUS PRN
Status: DISCONTINUED | OUTPATIENT
Start: 2017-01-01 | End: 2017-01-01

## 2017-01-01 RX ORDER — METOPROLOL TARTRATE 1 MG/ML
5 INJECTION, SOLUTION INTRAVENOUS EVERY 6 HOURS PRN
Status: DISCONTINUED | OUTPATIENT
Start: 2017-01-01 | End: 2017-01-01

## 2017-01-01 RX ORDER — LANOLIN ALCOHOL/MO/W.PET/CERES
3 CREAM (GRAM) TOPICAL AT BEDTIME
Status: DISCONTINUED | OUTPATIENT
Start: 2017-01-01 | End: 2017-01-01 | Stop reason: HOSPADM

## 2017-01-01 RX ORDER — POTASSIUM CHLORIDE 750 MG/1
20-40 TABLET, EXTENDED RELEASE ORAL
Status: DISCONTINUED | OUTPATIENT
Start: 2017-01-01 | End: 2017-01-01

## 2017-01-01 RX ORDER — CLINDAMYCIN PHOSPHATE 900 MG/50ML
900 INJECTION, SOLUTION INTRAVENOUS SEE ADMIN INSTRUCTIONS
Status: DISCONTINUED | OUTPATIENT
Start: 2017-01-01 | End: 2017-01-01

## 2017-01-01 RX ORDER — HEPARIN SODIUM 5000 [USP'U]/.5ML
5000 INJECTION, SOLUTION INTRAVENOUS; SUBCUTANEOUS EVERY 8 HOURS
Status: DISCONTINUED | OUTPATIENT
Start: 2017-01-01 | End: 2017-01-01

## 2017-01-01 RX ORDER — IOPAMIDOL 755 MG/ML
75 INJECTION, SOLUTION INTRAVASCULAR ONCE
Status: COMPLETED | OUTPATIENT
Start: 2017-01-01 | End: 2017-01-01

## 2017-01-01 RX ORDER — ACETAMINOPHEN 325 MG/1
975 TABLET ORAL ONCE
Status: COMPLETED | OUTPATIENT
Start: 2017-01-01 | End: 2017-01-01

## 2017-01-01 RX ORDER — ACETAMINOPHEN 325 MG/1
325-650 TABLET ORAL
Status: COMPLETED | OUTPATIENT
Start: 2017-01-01 | End: 2017-01-01

## 2017-01-01 RX ORDER — ALUMINA, MAGNESIA, AND SIMETHICONE 2400; 2400; 240 MG/30ML; MG/30ML; MG/30ML
30 SUSPENSION ORAL EVERY 4 HOURS PRN
Status: DISCONTINUED | OUTPATIENT
Start: 2017-01-01 | End: 2017-01-01 | Stop reason: HOSPADM

## 2017-01-01 RX ORDER — OXYCODONE HCL 5 MG/5 ML
2.5 SOLUTION, ORAL ORAL EVERY 4 HOURS PRN
Status: DISCONTINUED | OUTPATIENT
Start: 2017-01-01 | End: 2017-01-01

## 2017-01-01 RX ORDER — SODIUM CHLORIDE, SODIUM LACTATE, POTASSIUM CHLORIDE, CALCIUM CHLORIDE 600; 310; 30; 20 MG/100ML; MG/100ML; MG/100ML; MG/100ML
INJECTION, SOLUTION INTRAVENOUS CONTINUOUS PRN
Status: DISCONTINUED | OUTPATIENT
Start: 2017-01-01 | End: 2017-01-01

## 2017-01-01 RX ORDER — POLYETHYLENE GLYCOL 3350 17 G/17G
17 POWDER, FOR SOLUTION ORAL DAILY
Status: ON HOLD | COMMUNITY
End: 2017-01-01

## 2017-01-01 RX ORDER — BISACODYL 5 MG
5 TABLET, DELAYED RELEASE (ENTERIC COATED) ORAL DAILY PRN
Status: DISCONTINUED | OUTPATIENT
Start: 2017-01-01 | End: 2017-01-01

## 2017-01-01 RX ORDER — CLINDAMYCIN PHOSPHATE 900 MG/50ML
900 INJECTION, SOLUTION INTRAVENOUS
Status: DISCONTINUED | OUTPATIENT
Start: 2017-01-01 | End: 2017-01-01

## 2017-01-01 RX ORDER — FUROSEMIDE 10 MG/ML
20 INJECTION INTRAMUSCULAR; INTRAVENOUS ONCE
Status: COMPLETED | OUTPATIENT
Start: 2017-01-01 | End: 2017-01-01

## 2017-01-01 RX ORDER — HEPARIN SODIUM 1000 [USP'U]/ML
INJECTION, SOLUTION INTRAVENOUS; SUBCUTANEOUS PRN
Status: DISCONTINUED | OUTPATIENT
Start: 2017-01-01 | End: 2017-01-01

## 2017-01-01 RX ORDER — ONDANSETRON 4 MG/1
4-8 TABLET, ORALLY DISINTEGRATING ORAL EVERY 6 HOURS PRN
Status: DISCONTINUED | OUTPATIENT
Start: 2017-01-01 | End: 2017-01-01 | Stop reason: HOSPADM

## 2017-01-01 RX ORDER — OLANZAPINE 5 MG/1
5 TABLET, ORALLY DISINTEGRATING ORAL AT BEDTIME
Status: DISCONTINUED | OUTPATIENT
Start: 2017-01-01 | End: 2017-01-01 | Stop reason: HOSPADM

## 2017-01-01 RX ORDER — ALBUTEROL SULFATE 90 UG/1
2 AEROSOL, METERED RESPIRATORY (INHALATION) 4 TIMES DAILY PRN
DISCHARGE
Start: 2017-01-01

## 2017-01-01 RX ORDER — CIPROFLOXACIN 2 MG/ML
400 INJECTION, SOLUTION INTRAVENOUS EVERY 24 HOURS
Status: DISCONTINUED | OUTPATIENT
Start: 2017-01-01 | End: 2017-01-01

## 2017-01-01 RX ORDER — ONDANSETRON 4 MG/1
4 TABLET, ORALLY DISINTEGRATING ORAL EVERY 6 HOURS PRN
Status: DISCONTINUED | OUTPATIENT
Start: 2017-01-01 | End: 2017-01-01

## 2017-01-01 RX ORDER — POTASSIUM CHLORIDE 1.5 G/1.58G
20-40 POWDER, FOR SOLUTION ORAL
Status: DISCONTINUED | OUTPATIENT
Start: 2017-01-01 | End: 2017-01-01

## 2017-01-01 RX ORDER — POTASSIUM CHLORIDE 750 MG/1
20 TABLET, EXTENDED RELEASE ORAL ONCE
Status: DISCONTINUED | OUTPATIENT
Start: 2017-01-01 | End: 2017-01-01

## 2017-01-01 RX ORDER — ALBUTEROL SULFATE 90 UG/1
2 AEROSOL, METERED RESPIRATORY (INHALATION) EVERY 6 HOURS PRN
Status: ON HOLD | COMMUNITY
End: 2017-01-01

## 2017-01-01 RX ORDER — POTASSIUM CHLORIDE 7.45 MG/ML
10 INJECTION INTRAVENOUS
Status: DISCONTINUED | OUTPATIENT
Start: 2017-01-01 | End: 2017-01-01 | Stop reason: HOSPADM

## 2017-01-01 RX ORDER — POTASSIUM CHLORIDE 7.45 MG/ML
10 INJECTION INTRAVENOUS
Status: DISCONTINUED | OUTPATIENT
Start: 2017-01-01 | End: 2017-01-01

## 2017-01-01 RX ORDER — CEFAZOLIN SODIUM 1 G
1 VIAL (EA) INJECTION EVERY 8 HOURS
Status: DISCONTINUED | OUTPATIENT
Start: 2017-01-01 | End: 2017-01-01

## 2017-01-01 RX ORDER — LIDOCAINE 40 MG/G
CREAM TOPICAL
Status: DISCONTINUED | OUTPATIENT
Start: 2017-01-01 | End: 2017-01-01

## 2017-01-01 RX ORDER — POTASSIUM CHLORIDE 750 MG/1
40 TABLET, EXTENDED RELEASE ORAL ONCE
Status: DISCONTINUED | OUTPATIENT
Start: 2017-01-01 | End: 2017-01-01

## 2017-01-01 RX ORDER — POTASSIUM CL/LIDO/0.9 % NACL 10MEQ/0.1L
10 INTRAVENOUS SOLUTION, PIGGYBACK (ML) INTRAVENOUS
Status: DISCONTINUED | OUTPATIENT
Start: 2017-01-01 | End: 2017-01-01 | Stop reason: HOSPADM

## 2017-01-01 RX ORDER — ASPIRIN 81 MG/1
81 TABLET ORAL DAILY
Status: DISCONTINUED | OUTPATIENT
Start: 2017-01-01 | End: 2017-01-01 | Stop reason: HOSPADM

## 2017-01-01 RX ORDER — ALBUTEROL SULFATE 0.83 MG/ML
2.5 SOLUTION RESPIRATORY (INHALATION)
Status: DISCONTINUED | OUTPATIENT
Start: 2017-01-01 | End: 2017-01-01 | Stop reason: HOSPADM

## 2017-01-01 RX ORDER — ALPRAZOLAM 0.5 MG
0.5 TABLET ORAL
Status: DISCONTINUED | OUTPATIENT
Start: 2017-01-01 | End: 2017-01-01 | Stop reason: HOSPADM

## 2017-01-01 RX ORDER — LORAZEPAM 2 MG/ML
0.25 INJECTION INTRAMUSCULAR EVERY 4 HOURS PRN
Status: DISCONTINUED | OUTPATIENT
Start: 2017-01-01 | End: 2017-01-01

## 2017-01-01 RX ORDER — OLANZAPINE 5 MG/1
5 TABLET, ORALLY DISINTEGRATING ORAL AT BEDTIME
DISCHARGE
Start: 2017-01-01

## 2017-01-01 RX ORDER — POTASSIUM CL/LIDO/0.9 % NACL 10MEQ/0.1L
10 INTRAVENOUS SOLUTION, PIGGYBACK (ML) INTRAVENOUS
Status: COMPLETED | OUTPATIENT
Start: 2017-01-01 | End: 2017-01-01

## 2017-01-01 RX ORDER — ROSUVASTATIN CALCIUM 40 MG/1
40 TABLET, COATED ORAL DAILY
Qty: 30 TABLET | Refills: 1 | Status: ON HOLD | OUTPATIENT
Start: 2017-01-01 | End: 2017-01-01

## 2017-01-01 RX ORDER — HYDROMORPHONE HCL/0.9% NACL/PF 0.2MG/0.2
0.2 SYRINGE (ML) INTRAVENOUS
Status: DISCONTINUED | OUTPATIENT
Start: 2017-01-01 | End: 2017-01-01

## 2017-01-01 RX ORDER — VANCOMYCIN HYDROCHLORIDE 1 G/200ML
1000 INJECTION, SOLUTION INTRAVENOUS ONCE
Status: COMPLETED | OUTPATIENT
Start: 2017-01-01 | End: 2017-01-01

## 2017-01-01 RX ORDER — HEPARIN SODIUM,PORCINE 10 UNIT/ML
5-10 VIAL (ML) INTRAVENOUS EVERY 24 HOURS
Status: DISCONTINUED | OUTPATIENT
Start: 2017-01-01 | End: 2017-01-01 | Stop reason: HOSPADM

## 2017-01-01 RX ORDER — AMOXICILLIN 250 MG
1 CAPSULE ORAL 2 TIMES DAILY PRN
Status: DISCONTINUED | OUTPATIENT
Start: 2017-01-01 | End: 2017-01-01 | Stop reason: HOSPADM

## 2017-01-01 RX ORDER — ONDANSETRON 4 MG/1
4 TABLET, ORALLY DISINTEGRATING ORAL EVERY 6 HOURS PRN
Status: DISCONTINUED | OUTPATIENT
Start: 2017-01-01 | End: 2017-01-01 | Stop reason: HOSPADM

## 2017-01-01 RX ORDER — ALBUTEROL SULFATE 90 UG/1
2 AEROSOL, METERED RESPIRATORY (INHALATION) 4 TIMES DAILY PRN
Status: DISCONTINUED | OUTPATIENT
Start: 2017-01-01 | End: 2017-01-01 | Stop reason: HOSPADM

## 2017-01-01 RX ORDER — BISACODYL 5 MG
10 TABLET, DELAYED RELEASE (ENTERIC COATED) ORAL DAILY PRN
Status: DISCONTINUED | OUTPATIENT
Start: 2017-01-01 | End: 2017-01-01

## 2017-01-01 RX ORDER — OLANZAPINE 5 MG/1
5-10 TABLET, ORALLY DISINTEGRATING ORAL 2 TIMES DAILY PRN
DISCHARGE
Start: 2017-01-01

## 2017-01-01 RX ORDER — CLINDAMYCIN PHOSPHATE 900 MG/50ML
900 INJECTION, SOLUTION INTRAVENOUS EVERY 8 HOURS
Status: COMPLETED | OUTPATIENT
Start: 2017-01-01 | End: 2017-01-01

## 2017-01-01 RX ORDER — ACETAMINOPHEN 325 MG/1
650 TABLET ORAL EVERY 4 HOURS PRN
Status: DISCONTINUED | OUTPATIENT
Start: 2017-01-01 | End: 2017-01-01

## 2017-01-01 RX ORDER — FENTANYL CITRATE 50 UG/ML
25-50 INJECTION, SOLUTION INTRAMUSCULAR; INTRAVENOUS
Status: DISCONTINUED | OUTPATIENT
Start: 2017-01-01 | End: 2017-01-01 | Stop reason: HOSPADM

## 2017-01-01 RX ORDER — IOPAMIDOL 755 MG/ML
INJECTION, SOLUTION INTRAVASCULAR PRN
Status: DISCONTINUED | OUTPATIENT
Start: 2017-01-01 | End: 2017-01-01

## 2017-01-01 RX ADMIN — Medication 0.2 MG: at 18:01

## 2017-01-01 RX ADMIN — ROSUVASTATIN CALCIUM 40 MG: 20 TABLET, FILM COATED ORAL at 07:58

## 2017-01-01 RX ADMIN — PHENYLEPHRINE HYDROCHLORIDE 0.2 MCG/KG/MIN: 10 INJECTION, SOLUTION INTRAMUSCULAR; INTRAVENOUS; SUBCUTANEOUS at 14:51

## 2017-01-01 RX ADMIN — SODIUM CHLORIDE, POTASSIUM CHLORIDE, SODIUM LACTATE AND CALCIUM CHLORIDE: 600; 310; 30; 20 INJECTION, SOLUTION INTRAVENOUS at 22:25

## 2017-01-01 RX ADMIN — ONDANSETRON 4 MG: 2 INJECTION INTRAMUSCULAR; INTRAVENOUS at 19:26

## 2017-01-01 RX ADMIN — HEPARIN SODIUM 5000 UNITS: 5000 INJECTION, SOLUTION INTRAVENOUS; SUBCUTANEOUS at 12:19

## 2017-01-01 RX ADMIN — ALPRAZOLAM 0.5 MG: 0.5 TABLET ORAL at 19:34

## 2017-01-01 RX ADMIN — THERA TABS 1 TABLET: TAB at 08:49

## 2017-01-01 RX ADMIN — PIPERACILLIN SODIUM AND TAZOBACTAM SODIUM 2.25 G: 36; 4.5 INJECTION, POWDER, FOR SOLUTION INTRAVENOUS at 09:08

## 2017-01-01 RX ADMIN — VANCOMYCIN HYDROCHLORIDE 1250 MG: 10 INJECTION, POWDER, LYOPHILIZED, FOR SOLUTION INTRAVENOUS at 10:32

## 2017-01-01 RX ADMIN — ALBUTEROL SULFATE 2 PUFF: 90 AEROSOL, METERED RESPIRATORY (INHALATION) at 19:51

## 2017-01-01 RX ADMIN — INSULIN ASPART 1 UNITS: 100 INJECTION, SOLUTION INTRAVENOUS; SUBCUTANEOUS at 01:21

## 2017-01-01 RX ADMIN — OLANZAPINE 5 MG: 5 TABLET, ORALLY DISINTEGRATING ORAL at 21:11

## 2017-01-01 RX ADMIN — SODIUM CHLORIDE, POTASSIUM CHLORIDE, SODIUM LACTATE AND CALCIUM CHLORIDE: 600; 310; 30; 20 INJECTION, SOLUTION INTRAVENOUS at 13:56

## 2017-01-01 RX ADMIN — LORAZEPAM 0.25 MG: 2 INJECTION, SOLUTION INTRAMUSCULAR; INTRAVENOUS at 20:54

## 2017-01-01 RX ADMIN — HEPARIN SODIUM 5000 UNITS: 5000 INJECTION, SOLUTION INTRAVENOUS; SUBCUTANEOUS at 06:00

## 2017-01-01 RX ADMIN — OLANZAPINE 5 MG: 5 TABLET, ORALLY DISINTEGRATING ORAL at 01:04

## 2017-01-01 RX ADMIN — Medication 10 MEQ: at 03:09

## 2017-01-01 RX ADMIN — ALBUTEROL SULFATE 2 PUFF: 90 AEROSOL, METERED RESPIRATORY (INHALATION) at 16:51

## 2017-01-01 RX ADMIN — Medication 10 MEQ: at 08:44

## 2017-01-01 RX ADMIN — HYDROMORPHONE HYDROCHLORIDE 0.3 MG: 1 INJECTION, SOLUTION INTRAMUSCULAR; INTRAVENOUS; SUBCUTANEOUS at 17:50

## 2017-01-01 RX ADMIN — CLINDAMYCIN PHOSPHATE 900 MG: 18 INJECTION, SOLUTION INTRAVENOUS at 08:09

## 2017-01-01 RX ADMIN — PIPERACILLIN SODIUM AND TAZOBACTAM SODIUM 2.25 G: 36; 4.5 INJECTION, POWDER, FOR SOLUTION INTRAVENOUS at 16:46

## 2017-01-01 RX ADMIN — ALBUTEROL SULFATE 2 PUFF: 90 AEROSOL, METERED RESPIRATORY (INHALATION) at 08:54

## 2017-01-01 RX ADMIN — Medication 10 MEQ: at 14:43

## 2017-01-01 RX ADMIN — SCOPALAMINE 1 PATCH: 1 PATCH, EXTENDED RELEASE TRANSDERMAL at 07:55

## 2017-01-01 RX ADMIN — Medication 10 MEQ: at 05:34

## 2017-01-01 RX ADMIN — VANCOMYCIN HYDROCHLORIDE 1250 MG: 10 INJECTION, POWDER, LYOPHILIZED, FOR SOLUTION INTRAVENOUS at 10:38

## 2017-01-01 RX ADMIN — SODIUM CHLORIDE, POTASSIUM CHLORIDE, SODIUM LACTATE AND CALCIUM CHLORIDE: 600; 310; 30; 20 INJECTION, SOLUTION INTRAVENOUS at 19:47

## 2017-01-01 RX ADMIN — HYDROMORPHONE HYDROCHLORIDE 0.3 MG: 1 INJECTION, SOLUTION INTRAMUSCULAR; INTRAVENOUS; SUBCUTANEOUS at 02:52

## 2017-01-01 RX ADMIN — ALBUTEROL SULFATE 2 PUFF: 90 AEROSOL, METERED RESPIRATORY (INHALATION) at 17:40

## 2017-01-01 RX ADMIN — Medication 5 MG: at 15:18

## 2017-01-01 RX ADMIN — HYDROMORPHONE HYDROCHLORIDE 0.5 MG: 1 INJECTION, SOLUTION INTRAMUSCULAR; INTRAVENOUS; SUBCUTANEOUS at 13:02

## 2017-01-01 RX ADMIN — ROSUVASTATIN CALCIUM 40 MG: 20 TABLET, FILM COATED ORAL at 08:55

## 2017-01-01 RX ADMIN — ONDANSETRON 4 MG: 4 TABLET, ORALLY DISINTEGRATING ORAL at 15:50

## 2017-01-01 RX ADMIN — ROSUVASTATIN CALCIUM 40 MG: 20 TABLET, FILM COATED ORAL at 08:49

## 2017-01-01 RX ADMIN — VANCOMYCIN HYDROCHLORIDE 1000 MG: 1 INJECTION, SOLUTION INTRAVENOUS at 12:27

## 2017-01-01 RX ADMIN — MELATONIN 3 MG: 3 TAB ORAL at 01:06

## 2017-01-01 RX ADMIN — ONDANSETRON 4 MG: 2 INJECTION INTRAMUSCULAR; INTRAVENOUS at 09:36

## 2017-01-01 RX ADMIN — POTASSIUM CHLORIDE, DEXTROSE MONOHYDRATE AND SODIUM CHLORIDE: 150; 5; 450 INJECTION, SOLUTION INTRAVENOUS at 10:38

## 2017-01-01 RX ADMIN — INSULIN ASPART 1 UNITS: 100 INJECTION, SOLUTION INTRAVENOUS; SUBCUTANEOUS at 12:14

## 2017-01-01 RX ADMIN — HYDROMORPHONE HYDROCHLORIDE 0.5 MG: 1 INJECTION, SOLUTION INTRAMUSCULAR; INTRAVENOUS; SUBCUTANEOUS at 17:21

## 2017-01-01 RX ADMIN — HEPARIN SODIUM 5000 UNITS: 5000 INJECTION, SOLUTION INTRAVENOUS; SUBCUTANEOUS at 21:01

## 2017-01-01 RX ADMIN — Medication 10 MEQ: at 13:02

## 2017-01-01 RX ADMIN — Medication 10 MEQ: at 19:56

## 2017-01-01 RX ADMIN — HYDROMORPHONE HYDROCHLORIDE 0.5 MG: 1 INJECTION, SOLUTION INTRAMUSCULAR; INTRAVENOUS; SUBCUTANEOUS at 21:07

## 2017-01-01 RX ADMIN — POTASSIUM CHLORIDE, DEXTROSE MONOHYDRATE AND SODIUM CHLORIDE: 150; 5; 450 INJECTION, SOLUTION INTRAVENOUS at 03:52

## 2017-01-01 RX ADMIN — CLINDAMYCIN PHOSPHATE 900 MG: 18 INJECTION, SOLUTION INTRAVENOUS at 22:47

## 2017-01-01 RX ADMIN — ALBUTEROL SULFATE 2 PUFF: 90 AEROSOL, METERED RESPIRATORY (INHALATION) at 22:04

## 2017-01-01 RX ADMIN — PIPERACILLIN SODIUM AND TAZOBACTAM SODIUM 2.25 G: 36; 4.5 INJECTION, POWDER, FOR SOLUTION INTRAVENOUS at 00:51

## 2017-01-01 RX ADMIN — PIPERACILLIN SODIUM AND TAZOBACTAM SODIUM 2.25 G: 36; 4.5 INJECTION, POWDER, FOR SOLUTION INTRAVENOUS at 17:47

## 2017-01-01 RX ADMIN — SODIUM CHLORIDE: 9 INJECTION, SOLUTION INTRAVENOUS at 13:20

## 2017-01-01 RX ADMIN — PIPERACILLIN SODIUM AND TAZOBACTAM SODIUM 2.25 G: 36; 4.5 INJECTION, POWDER, FOR SOLUTION INTRAVENOUS at 00:34

## 2017-01-01 RX ADMIN — HEPARIN SODIUM 5000 UNITS: 5000 INJECTION, SOLUTION INTRAVENOUS; SUBCUTANEOUS at 21:05

## 2017-01-01 RX ADMIN — LORAZEPAM 0.25 MG: 2 INJECTION, SOLUTION INTRAMUSCULAR; INTRAVENOUS at 01:10

## 2017-01-01 RX ADMIN — ALBUTEROL SULFATE 2 PUFF: 90 AEROSOL, METERED RESPIRATORY (INHALATION) at 09:45

## 2017-01-01 RX ADMIN — PIPERACILLIN SODIUM AND TAZOBACTAM SODIUM 2.25 G: 36; 4.5 INJECTION, POWDER, FOR SOLUTION INTRAVENOUS at 01:54

## 2017-01-01 RX ADMIN — ALBUTEROL SULFATE 2 PUFF: 90 AEROSOL, METERED RESPIRATORY (INHALATION) at 20:20

## 2017-01-01 RX ADMIN — POTASSIUM CHLORIDE, DEXTROSE MONOHYDRATE AND SODIUM CHLORIDE: 150; 5; 450 INJECTION, SOLUTION INTRAVENOUS at 22:00

## 2017-01-01 RX ADMIN — INSULIN ASPART 1 UNITS: 100 INJECTION, SOLUTION INTRAVENOUS; SUBCUTANEOUS at 16:31

## 2017-01-01 RX ADMIN — ALBUTEROL SULFATE 2 PUFF: 90 AEROSOL, METERED RESPIRATORY (INHALATION) at 00:08

## 2017-01-01 RX ADMIN — HALOPERIDOL LACTATE 1 MG: 5 INJECTION, SOLUTION INTRAMUSCULAR at 00:44

## 2017-01-01 RX ADMIN — Medication 10 MEQ: at 18:37

## 2017-01-01 RX ADMIN — HEPARIN SODIUM 5000 UNITS: 5000 INJECTION, SOLUTION INTRAVENOUS; SUBCUTANEOUS at 23:07

## 2017-01-01 RX ADMIN — PIPERACILLIN SODIUM AND TAZOBACTAM SODIUM 2.25 G: 36; 4.5 INJECTION, POWDER, FOR SOLUTION INTRAVENOUS at 02:48

## 2017-01-01 RX ADMIN — ONDANSETRON 8 MG: 2 INJECTION INTRAMUSCULAR; INTRAVENOUS at 21:12

## 2017-01-01 RX ADMIN — ALBUTEROL SULFATE 2 PUFF: 90 AEROSOL, METERED RESPIRATORY (INHALATION) at 04:33

## 2017-01-01 RX ADMIN — Medication 0.2 MG: at 19:56

## 2017-01-01 RX ADMIN — PROPOFOL 100 MCG/KG/MIN: 10 INJECTION, EMULSION INTRAVENOUS at 13:45

## 2017-01-01 RX ADMIN — MELATONIN TAB 3 MG 3 MG: 3 TAB at 21:10

## 2017-01-01 RX ADMIN — Medication 10 MEQ: at 06:34

## 2017-01-01 RX ADMIN — POTASSIUM CHLORIDE, DEXTROSE MONOHYDRATE AND SODIUM CHLORIDE: 150; 5; 450 INJECTION, SOLUTION INTRAVENOUS at 13:19

## 2017-01-01 RX ADMIN — PHENYLEPHRINE HYDROCHLORIDE 50 MCG: 10 INJECTION, SOLUTION INTRAMUSCULAR; INTRAVENOUS; SUBCUTANEOUS at 15:10

## 2017-01-01 RX ADMIN — Medication 0.2 MG: at 09:23

## 2017-01-01 RX ADMIN — OLANZAPINE 5 MG: 5 TABLET, ORALLY DISINTEGRATING ORAL at 20:12

## 2017-01-01 RX ADMIN — HALOPERIDOL LACTATE 0.5 MG: 5 INJECTION, SOLUTION INTRAMUSCULAR at 12:43

## 2017-01-01 RX ADMIN — Medication 10 MEQ: at 13:30

## 2017-01-01 RX ADMIN — HYDROMORPHONE HYDROCHLORIDE 0.3 MG: 1 INJECTION, SOLUTION INTRAMUSCULAR; INTRAVENOUS; SUBCUTANEOUS at 01:47

## 2017-01-01 RX ADMIN — Medication 5 MG: at 15:13

## 2017-01-01 RX ADMIN — ACETAMINOPHEN 975 MG: 325 TABLET, FILM COATED ORAL at 17:45

## 2017-01-01 RX ADMIN — PIPERACILLIN SODIUM AND TAZOBACTAM SODIUM 2.25 G: 36; 4.5 INJECTION, POWDER, FOR SOLUTION INTRAVENOUS at 08:46

## 2017-01-01 RX ADMIN — ASPIRIN 81 MG: 81 TABLET, COATED ORAL at 08:55

## 2017-01-01 RX ADMIN — ALBUTEROL SULFATE 2 PUFF: 90 AEROSOL, METERED RESPIRATORY (INHALATION) at 22:54

## 2017-01-01 RX ADMIN — Medication 5 MG: at 15:16

## 2017-01-01 RX ADMIN — ALBUTEROL SULFATE 2 PUFF: 90 AEROSOL, METERED RESPIRATORY (INHALATION) at 08:55

## 2017-01-01 RX ADMIN — SCOPALAMINE 1 PATCH: 1 PATCH, EXTENDED RELEASE TRANSDERMAL at 16:27

## 2017-01-01 RX ADMIN — ALBUTEROL SULFATE 2 PUFF: 90 AEROSOL, METERED RESPIRATORY (INHALATION) at 08:49

## 2017-01-01 RX ADMIN — INSULIN ASPART 1 UNITS: 100 INJECTION, SOLUTION INTRAVENOUS; SUBCUTANEOUS at 22:30

## 2017-01-01 RX ADMIN — PIPERACILLIN SODIUM AND TAZOBACTAM SODIUM 2.25 G: 36; 4.5 INJECTION, POWDER, FOR SOLUTION INTRAVENOUS at 10:12

## 2017-01-01 RX ADMIN — LIDOCAINE 2 PATCH: 50 PATCH CUTANEOUS at 07:54

## 2017-01-01 RX ADMIN — SODIUM CHLORIDE, POTASSIUM CHLORIDE, SODIUM LACTATE AND CALCIUM CHLORIDE: 600; 310; 30; 20 INJECTION, SOLUTION INTRAVENOUS at 04:52

## 2017-01-01 RX ADMIN — Medication 10 MEQ: at 12:03

## 2017-01-01 RX ADMIN — HYDROMORPHONE HYDROCHLORIDE 0.5 MG: 1 INJECTION, SOLUTION INTRAMUSCULAR; INTRAVENOUS; SUBCUTANEOUS at 06:32

## 2017-01-01 RX ADMIN — PHENYLEPHRINE HYDROCHLORIDE 200 MCG: 10 INJECTION, SOLUTION INTRAMUSCULAR; INTRAVENOUS; SUBCUTANEOUS at 14:49

## 2017-01-01 RX ADMIN — CIPROFLOXACIN 400 MG: 2 INJECTION, SOLUTION INTRAVENOUS at 01:37

## 2017-01-01 RX ADMIN — LIDOCAINE 2 PATCH: 50 PATCH CUTANEOUS at 08:42

## 2017-01-01 RX ADMIN — ONDANSETRON 4 MG: 2 INJECTION INTRAMUSCULAR; INTRAVENOUS at 20:27

## 2017-01-01 RX ADMIN — SODIUM CHLORIDE, POTASSIUM CHLORIDE, SODIUM LACTATE AND CALCIUM CHLORIDE: 600; 310; 30; 20 INJECTION, SOLUTION INTRAVENOUS at 04:10

## 2017-01-01 RX ADMIN — HEPARIN SODIUM 5000 UNITS: 5000 INJECTION, SOLUTION INTRAVENOUS; SUBCUTANEOUS at 13:30

## 2017-01-01 RX ADMIN — ALPRAZOLAM 0.5 MG: 0.5 TABLET ORAL at 23:04

## 2017-01-01 RX ADMIN — ASPIRIN 81 MG: 81 TABLET, COATED ORAL at 07:58

## 2017-01-01 RX ADMIN — PIPERACILLIN SODIUM AND TAZOBACTAM SODIUM 2.25 G: 36; 4.5 INJECTION, POWDER, FOR SOLUTION INTRAVENOUS at 01:06

## 2017-01-01 RX ADMIN — INSULIN ASPART 1 UNITS: 100 INJECTION, SOLUTION INTRAVENOUS; SUBCUTANEOUS at 02:24

## 2017-01-01 RX ADMIN — INSULIN ASPART 1 UNITS: 100 INJECTION, SOLUTION INTRAVENOUS; SUBCUTANEOUS at 21:47

## 2017-01-01 RX ADMIN — SCOPALAMINE 1 PATCH: 1 PATCH, EXTENDED RELEASE TRANSDERMAL at 17:24

## 2017-01-01 RX ADMIN — POTASSIUM CHLORIDE, DEXTROSE MONOHYDRATE AND SODIUM CHLORIDE: 150; 5; 450 INJECTION, SOLUTION INTRAVENOUS at 18:51

## 2017-01-01 RX ADMIN — ASPIRIN 81 MG: 81 TABLET, COATED ORAL at 08:49

## 2017-01-01 RX ADMIN — HYDROMORPHONE HYDROCHLORIDE 0.5 MG: 1 INJECTION, SOLUTION INTRAMUSCULAR; INTRAVENOUS; SUBCUTANEOUS at 22:01

## 2017-01-01 RX ADMIN — OLANZAPINE 5 MG: 5 TABLET, ORALLY DISINTEGRATING ORAL at 14:26

## 2017-01-01 RX ADMIN — PIPERACILLIN SODIUM AND TAZOBACTAM SODIUM 2.25 G: 36; 4.5 INJECTION, POWDER, FOR SOLUTION INTRAVENOUS at 18:25

## 2017-01-01 RX ADMIN — POTASSIUM CHLORIDE, DEXTROSE MONOHYDRATE AND SODIUM CHLORIDE: 150; 5; 450 INJECTION, SOLUTION INTRAVENOUS at 09:07

## 2017-01-01 RX ADMIN — ALBUTEROL SULFATE 2 PUFF: 90 AEROSOL, METERED RESPIRATORY (INHALATION) at 19:35

## 2017-01-01 RX ADMIN — OXYCODONE HYDROCHLORIDE 2.5 MG: 5 TABLET ORAL at 20:53

## 2017-01-01 RX ADMIN — OLANZAPINE 5 MG: 5 TABLET, ORALLY DISINTEGRATING ORAL at 21:27

## 2017-01-01 RX ADMIN — ONDANSETRON 4 MG: 4 TABLET, ORALLY DISINTEGRATING ORAL at 08:44

## 2017-01-01 RX ADMIN — VANCOMYCIN HYDROCHLORIDE 750 MG: 10 INJECTION, POWDER, LYOPHILIZED, FOR SOLUTION INTRAVENOUS at 16:53

## 2017-01-01 RX ADMIN — OLANZAPINE 5 MG: 10 INJECTION, POWDER, LYOPHILIZED, FOR SOLUTION INTRAMUSCULAR at 01:38

## 2017-01-01 RX ADMIN — MIDAZOLAM HYDROCHLORIDE 2 MG: 1 INJECTION, SOLUTION INTRAMUSCULAR; INTRAVENOUS at 14:11

## 2017-01-01 RX ADMIN — PIPERACILLIN SODIUM AND TAZOBACTAM SODIUM 2.25 G: 36; 4.5 INJECTION, POWDER, FOR SOLUTION INTRAVENOUS at 02:30

## 2017-01-01 RX ADMIN — Medication 10 MEQ: at 04:31

## 2017-01-01 RX ADMIN — OLANZAPINE 5 MG: 5 TABLET, ORALLY DISINTEGRATING ORAL at 21:40

## 2017-01-01 RX ADMIN — HEPARIN SODIUM 5000 UNITS: 5000 INJECTION, SOLUTION INTRAVENOUS; SUBCUTANEOUS at 08:45

## 2017-01-01 RX ADMIN — HEPARIN SODIUM 5000 UNITS: 5000 INJECTION, SOLUTION INTRAVENOUS; SUBCUTANEOUS at 13:43

## 2017-01-01 RX ADMIN — HYDROMORPHONE HYDROCHLORIDE 0.5 MG: 1 INJECTION, SOLUTION INTRAMUSCULAR; INTRAVENOUS; SUBCUTANEOUS at 02:22

## 2017-01-01 RX ADMIN — SODIUM CHLORIDE, POTASSIUM CHLORIDE, SODIUM LACTATE AND CALCIUM CHLORIDE: 600; 310; 30; 20 INJECTION, SOLUTION INTRAVENOUS at 07:45

## 2017-01-01 RX ADMIN — ALBUTEROL SULFATE 2 PUFF: 90 AEROSOL, METERED RESPIRATORY (INHALATION) at 20:25

## 2017-01-01 RX ADMIN — Medication 0.2 MG: at 04:29

## 2017-01-01 RX ADMIN — HYDROMORPHONE HYDROCHLORIDE 0.3 MG: 1 INJECTION, SOLUTION INTRAMUSCULAR; INTRAVENOUS; SUBCUTANEOUS at 01:08

## 2017-01-01 RX ADMIN — PIPERACILLIN SODIUM AND TAZOBACTAM SODIUM 2.25 G: 36; 4.5 INJECTION, POWDER, FOR SOLUTION INTRAVENOUS at 17:05

## 2017-01-01 RX ADMIN — ALPRAZOLAM 0.5 MG: 0.5 TABLET ORAL at 18:24

## 2017-01-01 RX ADMIN — ASPIRIN 81 MG: 81 TABLET, COATED ORAL at 09:47

## 2017-01-01 RX ADMIN — HYDROMORPHONE HYDROCHLORIDE 0.25 MG: 1 INJECTION, SOLUTION INTRAMUSCULAR; INTRAVENOUS; SUBCUTANEOUS at 04:16

## 2017-01-01 RX ADMIN — ALBUTEROL SULFATE 2 PUFF: 90 AEROSOL, METERED RESPIRATORY (INHALATION) at 20:43

## 2017-01-01 RX ADMIN — FENTANYL CITRATE 100 MCG: 50 INJECTION, SOLUTION INTRAMUSCULAR; INTRAVENOUS at 14:11

## 2017-01-01 RX ADMIN — HEPARIN SODIUM 5000 UNITS: 5000 INJECTION, SOLUTION INTRAVENOUS; SUBCUTANEOUS at 16:51

## 2017-01-01 RX ADMIN — Medication 10 MEQ: at 14:32

## 2017-01-01 RX ADMIN — ONDANSETRON 4 MG: 2 INJECTION INTRAMUSCULAR; INTRAVENOUS at 04:47

## 2017-01-01 RX ADMIN — MELATONIN 3 MG: 3 TAB ORAL at 02:39

## 2017-01-01 RX ADMIN — HEPARIN SODIUM 3000 UNITS: 1000 INJECTION, SOLUTION INTRAVENOUS; SUBCUTANEOUS at 15:52

## 2017-01-01 RX ADMIN — HYDROMORPHONE HYDROCHLORIDE 0.3 MG: 1 INJECTION, SOLUTION INTRAMUSCULAR; INTRAVENOUS; SUBCUTANEOUS at 22:37

## 2017-01-01 RX ADMIN — HALOPERIDOL LACTATE 0.5 MG: 5 INJECTION, SOLUTION INTRAMUSCULAR at 01:33

## 2017-01-01 RX ADMIN — POLYETHYLENE GLYCOL 3350 17 G: 17 POWDER, FOR SOLUTION ORAL at 18:34

## 2017-01-01 RX ADMIN — ASPIRIN 81 MG CHEWABLE TABLET 81 MG: 81 TABLET CHEWABLE at 13:14

## 2017-01-01 RX ADMIN — ASPIRIN 81 MG: 81 TABLET, COATED ORAL at 17:39

## 2017-01-01 RX ADMIN — Medication 10 MEQ: at 04:15

## 2017-01-01 RX ADMIN — ALBUTEROL SULFATE 2 PUFF: 90 AEROSOL, METERED RESPIRATORY (INHALATION) at 15:55

## 2017-01-01 RX ADMIN — ALBUTEROL SULFATE 2 PUFF: 90 AEROSOL, METERED RESPIRATORY (INHALATION) at 13:52

## 2017-01-01 RX ADMIN — SODIUM CHLORIDE, PRESERVATIVE FREE 91 ML: 5 INJECTION INTRAVENOUS at 02:10

## 2017-01-01 RX ADMIN — ALBUTEROL SULFATE 2 PUFF: 90 AEROSOL, METERED RESPIRATORY (INHALATION) at 11:53

## 2017-01-01 RX ADMIN — ALBUTEROL SULFATE 2 PUFF: 90 AEROSOL, METERED RESPIRATORY (INHALATION) at 09:05

## 2017-01-01 RX ADMIN — HALOPERIDOL LACTATE 1 MG: 5 INJECTION, SOLUTION INTRAMUSCULAR at 18:22

## 2017-01-01 RX ADMIN — ALBUTEROL SULFATE 2 PUFF: 90 AEROSOL, METERED RESPIRATORY (INHALATION) at 16:26

## 2017-01-01 RX ADMIN — HALOPERIDOL LACTATE 0.5 MG: 5 INJECTION, SOLUTION INTRAMUSCULAR at 19:35

## 2017-01-01 RX ADMIN — ALPRAZOLAM 0.5 MG: 0.5 TABLET ORAL at 20:16

## 2017-01-01 RX ADMIN — THERA TABS 1 TABLET: TAB at 14:26

## 2017-01-01 RX ADMIN — HALOPERIDOL LACTATE 1 MG: 5 INJECTION, SOLUTION INTRAMUSCULAR at 08:51

## 2017-01-01 RX ADMIN — ONDANSETRON 4 MG: 4 TABLET, ORALLY DISINTEGRATING ORAL at 13:40

## 2017-01-01 RX ADMIN — ALBUTEROL SULFATE 2 PUFF: 90 AEROSOL, METERED RESPIRATORY (INHALATION) at 05:16

## 2017-01-01 RX ADMIN — PIPERACILLIN SODIUM AND TAZOBACTAM SODIUM 2.25 G: 36; 4.5 INJECTION, POWDER, FOR SOLUTION INTRAVENOUS at 17:40

## 2017-01-01 RX ADMIN — PIPERACILLIN SODIUM AND TAZOBACTAM SODIUM 2.25 G: 36; 4.5 INJECTION, POWDER, FOR SOLUTION INTRAVENOUS at 09:23

## 2017-01-01 RX ADMIN — ROSUVASTATIN CALCIUM 40 MG: 20 TABLET, FILM COATED ORAL at 17:39

## 2017-01-01 RX ADMIN — LIDOCAINE 2 PATCH: 50 PATCH CUTANEOUS at 09:07

## 2017-01-01 RX ADMIN — ONDANSETRON 8 MG: 4 TABLET, ORALLY DISINTEGRATING ORAL at 22:45

## 2017-01-01 RX ADMIN — Medication 10 MEQ: at 14:02

## 2017-01-01 RX ADMIN — FUROSEMIDE 20 MG: 10 INJECTION, SOLUTION INTRAVENOUS at 10:48

## 2017-01-01 RX ADMIN — HYDROMORPHONE HYDROCHLORIDE 0.5 MG: 1 INJECTION, SOLUTION INTRAMUSCULAR; INTRAVENOUS; SUBCUTANEOUS at 07:04

## 2017-01-01 RX ADMIN — Medication 5 MG: at 14:49

## 2017-01-01 RX ADMIN — ALBUTEROL SULFATE 2 PUFF: 90 AEROSOL, METERED RESPIRATORY (INHALATION) at 19:56

## 2017-01-01 RX ADMIN — PROCHLORPERAZINE EDISYLATE 5 MG: 5 INJECTION INTRAMUSCULAR; INTRAVENOUS at 05:38

## 2017-01-01 RX ADMIN — SODIUM CHLORIDE: 9 INJECTION, SOLUTION INTRAVENOUS at 14:52

## 2017-01-01 RX ADMIN — ALPRAZOLAM 0.5 MG: 0.5 TABLET ORAL at 22:23

## 2017-01-01 RX ADMIN — HYDROMORPHONE HYDROCHLORIDE 0.3 MG: 1 INJECTION, SOLUTION INTRAMUSCULAR; INTRAVENOUS; SUBCUTANEOUS at 07:28

## 2017-01-01 RX ADMIN — ONDANSETRON 4 MG: 4 TABLET, ORALLY DISINTEGRATING ORAL at 01:06

## 2017-01-01 RX ADMIN — HYDROMORPHONE HYDROCHLORIDE 0.3 MG: 1 INJECTION, SOLUTION INTRAMUSCULAR; INTRAVENOUS; SUBCUTANEOUS at 20:36

## 2017-01-01 RX ADMIN — ACETAMINOPHEN 650 MG: 325 TABLET, FILM COATED ORAL at 04:41

## 2017-01-01 RX ADMIN — ONDANSETRON 4 MG: 2 INJECTION INTRAMUSCULAR; INTRAVENOUS at 14:49

## 2017-01-01 RX ADMIN — Medication 10 MEQ: at 16:22

## 2017-01-01 RX ADMIN — CLINDAMYCIN PHOSPHATE 900 MG: 18 INJECTION, SOLUTION INTRAVENOUS at 15:00

## 2017-01-01 RX ADMIN — LIDOCAINE 1 PATCH: 50 PATCH CUTANEOUS at 08:26

## 2017-01-01 RX ADMIN — SODIUM CHLORIDE, POTASSIUM CHLORIDE, SODIUM LACTATE AND CALCIUM CHLORIDE: 600; 310; 30; 20 INJECTION, SOLUTION INTRAVENOUS at 22:39

## 2017-01-01 RX ADMIN — ALBUTEROL SULFATE 2 PUFF: 90 AEROSOL, METERED RESPIRATORY (INHALATION) at 13:35

## 2017-01-01 RX ADMIN — SODIUM CHLORIDE: 9 INJECTION, SOLUTION INTRAVENOUS at 01:10

## 2017-01-01 RX ADMIN — HEPARIN SODIUM 5000 UNITS: 1000 INJECTION, SOLUTION INTRAVENOUS; SUBCUTANEOUS at 15:40

## 2017-01-01 RX ADMIN — POTASSIUM CHLORIDE, DEXTROSE MONOHYDRATE AND SODIUM CHLORIDE: 150; 5; 450 INJECTION, SOLUTION INTRAVENOUS at 00:58

## 2017-01-01 RX ADMIN — HEPARIN SODIUM 5000 UNITS: 5000 INJECTION, SOLUTION INTRAVENOUS; SUBCUTANEOUS at 05:55

## 2017-01-01 RX ADMIN — HYDROMORPHONE HYDROCHLORIDE 0.25 MG: 1 INJECTION, SOLUTION INTRAMUSCULAR; INTRAVENOUS; SUBCUTANEOUS at 00:11

## 2017-01-01 RX ADMIN — PIPERACILLIN SODIUM AND TAZOBACTAM SODIUM 2.25 G: 36; 4.5 INJECTION, POWDER, FOR SOLUTION INTRAVENOUS at 18:31

## 2017-01-01 RX ADMIN — OLANZAPINE 5 MG: 5 TABLET, ORALLY DISINTEGRATING ORAL at 11:24

## 2017-01-01 RX ADMIN — POTASSIUM CHLORIDE, DEXTROSE MONOHYDRATE AND SODIUM CHLORIDE: 150; 5; 450 INJECTION, SOLUTION INTRAVENOUS at 13:53

## 2017-01-01 RX ADMIN — ALPRAZOLAM 0.5 MG: 0.5 TABLET ORAL at 20:34

## 2017-01-01 RX ADMIN — PIPERACILLIN SODIUM AND TAZOBACTAM SODIUM 2.25 G: 36; 4.5 INJECTION, POWDER, FOR SOLUTION INTRAVENOUS at 01:08

## 2017-01-01 RX ADMIN — HEPARIN SODIUM 5000 UNITS: 5000 INJECTION, SOLUTION INTRAVENOUS; SUBCUTANEOUS at 05:07

## 2017-01-01 RX ADMIN — ALBUTEROL SULFATE 2 PUFF: 90 AEROSOL, METERED RESPIRATORY (INHALATION) at 13:16

## 2017-01-01 RX ADMIN — ALBUTEROL SULFATE 2 PUFF: 90 AEROSOL, METERED RESPIRATORY (INHALATION) at 16:14

## 2017-01-01 RX ADMIN — ALPRAZOLAM 0.5 MG: 0.5 TABLET ORAL at 22:43

## 2017-01-01 RX ADMIN — HYDROMORPHONE HYDROCHLORIDE 0.5 MG: 1 INJECTION, SOLUTION INTRAMUSCULAR; INTRAVENOUS; SUBCUTANEOUS at 17:42

## 2017-01-01 RX ADMIN — ALPRAZOLAM 0.5 MG: 0.5 TABLET ORAL at 20:43

## 2017-01-01 RX ADMIN — OXYCODONE HYDROCHLORIDE 2.5 MG: 5 TABLET ORAL at 06:32

## 2017-01-01 RX ADMIN — OXYCODONE HYDROCHLORIDE 2.5 MG: 5 TABLET ORAL at 13:14

## 2017-01-01 RX ADMIN — CEFTRIAXONE SODIUM 1 G: 10 INJECTION, POWDER, FOR SOLUTION INTRAVENOUS at 02:49

## 2017-01-01 RX ADMIN — HYDROMORPHONE HYDROCHLORIDE 0.3 MG: 1 INJECTION, SOLUTION INTRAMUSCULAR; INTRAVENOUS; SUBCUTANEOUS at 21:23

## 2017-01-01 RX ADMIN — ONDANSETRON 4 MG: 4 TABLET, ORALLY DISINTEGRATING ORAL at 16:28

## 2017-01-01 RX ADMIN — HALOPERIDOL LACTATE 1 MG: 5 INJECTION, SOLUTION INTRAMUSCULAR at 14:59

## 2017-01-01 RX ADMIN — POTASSIUM CHLORIDE, DEXTROSE MONOHYDRATE AND SODIUM CHLORIDE: 150; 5; 450 INJECTION, SOLUTION INTRAVENOUS at 08:40

## 2017-01-01 RX ADMIN — PIPERACILLIN SODIUM AND TAZOBACTAM SODIUM 2.25 G: 36; 4.5 INJECTION, POWDER, FOR SOLUTION INTRAVENOUS at 16:53

## 2017-01-01 RX ADMIN — PIPERACILLIN SODIUM AND TAZOBACTAM SODIUM 2.25 G: 36; 4.5 INJECTION, POWDER, FOR SOLUTION INTRAVENOUS at 01:35

## 2017-01-01 RX ADMIN — HYDROMORPHONE HYDROCHLORIDE 0.5 MG: 1 INJECTION, SOLUTION INTRAMUSCULAR; INTRAVENOUS; SUBCUTANEOUS at 02:12

## 2017-01-01 RX ADMIN — HYDROMORPHONE HYDROCHLORIDE 2 MG: 1 SOLUTION ORAL at 21:11

## 2017-01-01 RX ADMIN — Medication 0.2 MG: at 12:24

## 2017-01-01 RX ADMIN — SODIUM CHLORIDE 1000 ML: 9 INJECTION, SOLUTION INTRAVENOUS at 20:13

## 2017-01-01 RX ADMIN — HYDROMORPHONE HYDROCHLORIDE 0.3 MG: 1 INJECTION, SOLUTION INTRAMUSCULAR; INTRAVENOUS; SUBCUTANEOUS at 18:44

## 2017-01-01 RX ADMIN — ALBUTEROL SULFATE 2 PUFF: 90 AEROSOL, METERED RESPIRATORY (INHALATION) at 12:24

## 2017-01-01 RX ADMIN — OLANZAPINE 5 MG: 5 TABLET, ORALLY DISINTEGRATING ORAL at 02:27

## 2017-01-01 RX ADMIN — ALBUTEROL SULFATE 2.5 MG: 2.5 SOLUTION RESPIRATORY (INHALATION) at 04:10

## 2017-01-01 RX ADMIN — ACETAMINOPHEN 650 MG: 325 TABLET, FILM COATED ORAL at 18:35

## 2017-01-01 RX ADMIN — OLANZAPINE 5 MG: 5 TABLET, ORALLY DISINTEGRATING ORAL at 17:04

## 2017-01-01 RX ADMIN — GUAIFENESIN 10 ML: 100 SOLUTION ORAL at 16:13

## 2017-01-01 RX ADMIN — LIDOCAINE 2 PATCH: 50 PATCH CUTANEOUS at 09:52

## 2017-01-01 RX ADMIN — GUAIFENESIN 10 ML: 100 SOLUTION ORAL at 12:36

## 2017-01-01 RX ADMIN — SCOPALAMINE 1 PATCH: 1 PATCH, EXTENDED RELEASE TRANSDERMAL at 22:51

## 2017-01-01 RX ADMIN — HYDROMORPHONE HYDROCHLORIDE 0.5 MG: 1 INJECTION, SOLUTION INTRAMUSCULAR; INTRAVENOUS; SUBCUTANEOUS at 22:03

## 2017-01-01 RX ADMIN — ALPRAZOLAM 0.5 MG: 0.5 TABLET ORAL at 20:42

## 2017-01-01 RX ADMIN — HYDROMORPHONE HYDROCHLORIDE 0.3 MG: 1 INJECTION, SOLUTION INTRAMUSCULAR; INTRAVENOUS; SUBCUTANEOUS at 21:16

## 2017-01-01 RX ADMIN — ALBUTEROL SULFATE 2 PUFF: 90 AEROSOL, METERED RESPIRATORY (INHALATION) at 20:12

## 2017-01-01 RX ADMIN — SCOPALAMINE 1 PATCH: 1 PATCH, EXTENDED RELEASE TRANSDERMAL at 23:55

## 2017-01-01 RX ADMIN — HEPARIN SODIUM 5000 UNITS: 5000 INJECTION, SOLUTION INTRAVENOUS; SUBCUTANEOUS at 22:54

## 2017-01-01 RX ADMIN — HYDROMORPHONE HYDROCHLORIDE 0.3 MG: 1 INJECTION, SOLUTION INTRAMUSCULAR; INTRAVENOUS; SUBCUTANEOUS at 16:42

## 2017-01-01 RX ADMIN — PIPERACILLIN SODIUM AND TAZOBACTAM SODIUM 2.25 G: 36; 4.5 INJECTION, POWDER, FOR SOLUTION INTRAVENOUS at 10:04

## 2017-01-01 RX ADMIN — FLUDEOXYGLUCOSE F-18 12.9 MCI.: 500 INJECTION, SOLUTION INTRAVENOUS at 15:41

## 2017-01-01 RX ADMIN — ALBUTEROL SULFATE 2 PUFF: 90 AEROSOL, METERED RESPIRATORY (INHALATION) at 12:30

## 2017-01-01 RX ADMIN — SODIUM CHLORIDE, POTASSIUM CHLORIDE, SODIUM LACTATE AND CALCIUM CHLORIDE: 600; 310; 30; 20 INJECTION, SOLUTION INTRAVENOUS at 06:00

## 2017-01-01 RX ADMIN — HUMAN ALBUMIN MICROSPHERES AND PERFLUTREN 7 ML: 10; .22 INJECTION, SOLUTION INTRAVENOUS at 11:00

## 2017-01-01 RX ADMIN — Medication 10 MEQ: at 00:47

## 2017-01-01 RX ADMIN — SODIUM CHLORIDE, POTASSIUM CHLORIDE, SODIUM LACTATE AND CALCIUM CHLORIDE: 600; 310; 30; 20 INJECTION, SOLUTION INTRAVENOUS at 12:43

## 2017-01-01 RX ADMIN — LIDOCAINE 1 PATCH: 50 PATCH CUTANEOUS at 08:10

## 2017-01-01 RX ADMIN — HYDROMORPHONE HYDROCHLORIDE 0.3 MG: 1 INJECTION, SOLUTION INTRAMUSCULAR; INTRAVENOUS; SUBCUTANEOUS at 01:52

## 2017-01-01 RX ADMIN — ALBUTEROL SULFATE 2 PUFF: 90 AEROSOL, METERED RESPIRATORY (INHALATION) at 08:42

## 2017-01-01 RX ADMIN — Medication 0.2 MG: at 13:49

## 2017-01-01 RX ADMIN — ROSUVASTATIN CALCIUM 40 MG: 20 TABLET, FILM COATED ORAL at 09:47

## 2017-01-01 RX ADMIN — IOPAMIDOL 75 ML: 755 INJECTION, SOLUTION INTRAVENOUS at 02:10

## 2017-01-01 RX ADMIN — SENNOSIDES AND DOCUSATE SODIUM 2 TABLET: 8.6; 5 TABLET ORAL at 12:20

## 2017-01-01 RX ADMIN — PROCHLORPERAZINE EDISYLATE 5 MG: 5 INJECTION INTRAMUSCULAR; INTRAVENOUS at 22:00

## 2017-01-01 RX ADMIN — ALBUMIN (HUMAN): 12.5 SOLUTION INTRAVENOUS at 16:27

## 2017-01-01 RX ADMIN — SODIUM CHLORIDE, PRESERVATIVE FREE 5 ML: 5 INJECTION INTRAVENOUS at 13:54

## 2017-01-01 RX ADMIN — GUAIFENESIN 10 ML: 100 SOLUTION ORAL at 11:53

## 2017-01-01 RX ADMIN — SODIUM CHLORIDE: 9 INJECTION, SOLUTION INTRAVENOUS at 23:14

## 2017-01-01 RX ADMIN — OXYCODONE HYDROCHLORIDE 2.5 MG: 5 TABLET ORAL at 17:24

## 2017-01-01 RX ADMIN — ALBUTEROL SULFATE 2 PUFF: 90 AEROSOL, METERED RESPIRATORY (INHALATION) at 07:59

## 2017-01-01 RX ADMIN — ALBUTEROL SULFATE 2 PUFF: 90 AEROSOL, METERED RESPIRATORY (INHALATION) at 06:29

## 2017-01-01 RX ADMIN — PIPERACILLIN SODIUM AND TAZOBACTAM SODIUM 2.25 G: 36; 4.5 INJECTION, POWDER, FOR SOLUTION INTRAVENOUS at 10:37

## 2017-01-01 RX ADMIN — ALBUTEROL SULFATE 2 PUFF: 90 AEROSOL, METERED RESPIRATORY (INHALATION) at 18:38

## 2017-01-01 RX ADMIN — PIPERACILLIN SODIUM AND TAZOBACTAM SODIUM 2.25 G: 36; 4.5 INJECTION, POWDER, FOR SOLUTION INTRAVENOUS at 09:43

## 2017-01-01 RX ADMIN — PIPERACILLIN SODIUM AND TAZOBACTAM SODIUM 2.25 G: 36; 4.5 INJECTION, POWDER, FOR SOLUTION INTRAVENOUS at 09:03

## 2017-01-01 RX ADMIN — PIPERACILLIN SODIUM AND TAZOBACTAM SODIUM 2.25 G: 36; 4.5 INJECTION, POWDER, FOR SOLUTION INTRAVENOUS at 17:02

## 2017-01-01 RX ADMIN — PROCHLORPERAZINE EDISYLATE 10 MG: 5 INJECTION INTRAMUSCULAR; INTRAVENOUS at 22:55

## 2017-01-01 RX ADMIN — Medication 10 MEQ: at 02:02

## 2017-01-01 RX ADMIN — OLANZAPINE 5 MG: 5 TABLET, ORALLY DISINTEGRATING ORAL at 22:40

## 2017-01-01 RX ADMIN — POTASSIUM CHLORIDE, DEXTROSE MONOHYDRATE AND SODIUM CHLORIDE: 150; 5; 450 INJECTION, SOLUTION INTRAVENOUS at 17:01

## 2017-01-01 RX ADMIN — ONDANSETRON 4 MG: 2 INJECTION INTRAMUSCULAR; INTRAVENOUS at 19:47

## 2017-01-01 ASSESSMENT — PAIN DESCRIPTION - DESCRIPTORS
DESCRIPTORS: PATIENT UNABLE TO DESCRIBE
DESCRIPTORS: PATIENT UNABLE TO DESCRIBE
DESCRIPTORS: SORE
DESCRIPTORS: ACHING
DESCRIPTORS: ACHING;DISCOMFORT
DESCRIPTORS: PATIENT UNABLE TO DESCRIBE
DESCRIPTORS: CONSTANT
DESCRIPTORS: PATIENT UNABLE TO DESCRIBE
DESCRIPTORS: SORE
DESCRIPTORS: PATIENT UNABLE TO DESCRIBE
DESCRIPTORS: DISCOMFORT
DESCRIPTORS: ACHING;SORE
DESCRIPTORS: CONSTANT
DESCRIPTORS: PATIENT UNABLE TO DESCRIBE
DESCRIPTORS: PATIENT UNABLE TO DESCRIBE
DESCRIPTORS: CONSTANT
DESCRIPTORS: ACHING;CONSTANT
DESCRIPTORS: PATIENT UNABLE TO DESCRIBE
DESCRIPTORS: ACHING;SORE
DESCRIPTORS: CONSTANT;DISCOMFORT
DESCRIPTORS: PATIENT UNABLE TO DESCRIBE

## 2017-01-01 ASSESSMENT — ACTIVITIES OF DAILY LIVING (ADL)
BATHING: 2-->ASSISTIVE PERSON
DRESS: 0-->INDEPENDENT
COGNITION: 1 - ATTENTION OR MEMORY DEFICITS
BATHING: 0-->INDEPENDENT
COGNITION: 0 - NO COGNITION ISSUES REPORTED
RETIRED_COMMUNICATION: 0-->UNDERSTANDS/COMMUNICATES WITHOUT DIFFICULTY
AMBULATION: 2-->ASSISTIVE PERSON
RETIRED_COMMUNICATION: 2-->DIFFICULTY UNDERSTANDING AND SPEAKING (NOT RELATED TO LANGUAGE BARRIER)
TOILETING: 2-->ASSISTIVE PERSON
TRANSFERRING: 0-->INDEPENDENT
FALL_HISTORY_WITHIN_LAST_SIX_MONTHS: NO
SWALLOWING: 0-->SWALLOWS FOODS/LIQUIDS WITHOUT DIFFICULTY
SWALLOWING: 2-->DIFFICULTY SWALLOWING LIQUIDS
DRESS: 2-->ASSISTIVE PERSON
RETIRED_EATING: 2-->ASSISTIVE PERSON
RETIRED_EATING: 0-->INDEPENDENT
TRANSFERRING: 2-->ASSISTIVE PERSON
TOILETING: 0-->INDEPENDENT
WHICH_OF_THE_ABOVE_FUNCTIONAL_RISKS_HAD_A_RECENT_ONSET_OR_CHANGE?: SWALLOWING;EATING
FALL_HISTORY_WITHIN_LAST_SIX_MONTHS: NO
AMBULATION: 1-->ASSISTIVE EQUIPMENT

## 2017-01-01 ASSESSMENT — VISUAL ACUITY
OU: BLURRED VISION
OU: BLURRED VISION
OU: NORMAL ACUITY
OU: BLURRED VISION
OU: NORMAL ACUITY

## 2017-01-01 ASSESSMENT — LIFESTYLE VARIABLES: TOBACCO_USE: 1

## 2017-01-01 ASSESSMENT — COPD QUESTIONNAIRES: COPD: 1

## 2017-10-06 PROBLEM — I71.40 AAA (ABDOMINAL AORTIC ANEURYSM) (H): Status: ACTIVE | Noted: 2017-01-01

## 2017-10-06 NOTE — LETTER
Transition Communication Hand-off for Care Transitions to Next Level of Care Provider    Name: Sandy Sheffield  MRN #: [id number]  Primary Care Provider: Cecilio Montes     Primary Clinic: [facility] [address] [place] [address]     Reason for Hospitalization:  Abdominal aortic aneurysm (AAA) (H) [I71.4]  Admit Date/Time: 10/6/2017 10:43 PM  Discharge Date: 10/17/17  Payor Source: Payor: BCBS / Plan: FED[employer]AL EMPLOYEE PROGRAM / Product Type: PPO /     Readmission Assessment Measure (NEELAM) Risk Score/category: Elevated         Reason for Communication Hand-off Referral: Other D/c to TCU    Discharge Plan:  Social Work Services Discharge Note      Patient Name:  Sandy Sheffield     Anticipated Discharge Date:  10/17/17    Discharge Disposition:   TCU:  [facility]   [address] [facility] [place] [address]  P. [phone], F. [phone]     Pre-Admission Screening (PAS) online form has been completed.  The Level of Care (LOC) is:  Determined  Confirmation Code is:  [id number].  Patient/caregiver informed of referral to [facility] for Pre-Admission Screening for skilled nursing facility (SNF) placement and to expect a phone call post discharge from SNF.     Additional Services/Equipment Arranged:  W/c transport arranged via [facility] ([phone]) for today at 1pm.     Patient / Family response to discharge plan:  Pt and kendra Whitfield are agreeable     Persons notified of above discharge plan:  Pt, pt's kendra Whitfield (who will update Mary), bedside nurse, charge nurse, NST, receiving facility, Vascular team     Concern for non-adherence with plan of care:   Y/N Unknown  Discharge Needs Assessment:  Needs       Most Recent Value    Equipment Currently Used at Home grab bar, shower chair, walker, rolling    Transportation Available family or friend will provide          Already enrolled in Tele-monitoring program and name of program:  Unknown  Follow-up specialty is recommended: Yes    Follow-up plan:  Future  Appointments  Date Time Provider Department Center   11/30/2017 3:20 PM [facility]2 [facility]   11/30/2017 3:45 PM Laura Casillas MD [facility]       Any outstanding tests or procedures:        Radiology & Cardiology Orders     Future Labs/Procedures Complete By Expires    CT Abdomen Pelvis w/o Contrast  10/17/2017 (Approximate) 10/17/2018    Comments:    Administration of IV contrast (contrast agent, dose, and amount) will be tailored to this examination per the appropriate written protocol listed in the Protocol E-Book, or by the supervising imaging provider.               SEE Anton, MSW  7B   [phone] (pager) [phone]  10/17/2017

## 2017-10-06 NOTE — IP AVS SNAPSHOT
MRN:[id number]                      After Visit Summary   10/6/2017    Sandy Sheffield    MRN: [id number]           Thank you!     Thank you for choosing [facility] for your care. Our goal is always to provide you with excellent care. Hearing back from our patients is one way we can continue to improve our services. Please take a few minutes to complete the written survey that you may receive in the mail after you visit with us. Thank you!        Patient Information     Date Of Birth          11/5/1932        Designated Caregiver       Most Recent Value    Caregiver    Will someone help with your care after discharge? yes    Name of designated caregiver Juan Manuel Sheffield    Phone number of caregiver [phone]    Caregiver address [place], [place] [unknown exact address]      About your hospital stay     You were admitted on:  October 6, 2017 You last received care in the:  [address] [facility]    You were discharged on:  October 17, 2017        Reason for your hospital stay       You underwent endovascular abdominal aneurysm repair with Dr. Casillas                  Who to Call     For medical emergencies, please call 911.  For non-urgent questions about your medical care, please call your primary care provider or clinic, [phone]  For questions related to your surgery, please call your surgery clinic        Attending Provider     Provider Laura Rodríguez MD Vascular Surgery       Primary Care Provider Office Phone # Fax #    Cecilio Montes PA-C [phone]       When to contact your care team       Contact Dr. Casillas's vascular surgery team if any increased pain, swelling, incision drainage or fever develop.     With questions, concerns, or to request an appointment, please call either:    Cecile Tavares, Care Coordinator RN, Vascular Surgery  [phone]    Vascular Call Center  [phone]    To contact someone after 5 pm, on a weekend, or on a Holiday, please  call:  [facility]  [phone], option 4 to have a member of the Vascular Surgery Service paged.                  After Care Instructions     Activity - Up with nursing assistance           Additional Discharge Instructions       Okay to shower, no tub baths            Advance Diet as Tolerated       Follow this diet upon discharge: Orders Placed This Encounter      Snacks/Supplements Adult:         Snacks/Supplements Adult: Ensure Plus (Adult); Between Meals      Advance Diet as Tolerated: Regular Diet Adult            Daily weights       Call Provider for weight gain of more than 2 pounds per day or 5 pounds per week.            Fall precautions           General info for SNF       Length of Stay Estimate: Short Term Care: Estimated # of Days <30  Condition at Discharge: Stable  Level of care:skilled   Rehabilitation Potential: Good  Admission H&P remains valid and up-to-date: Yes  Recent Chemotherapy: N/A  Use Nursing Home Standing Orders: Yes            Intake and output       Every shift            Wound care       Site:  Left arm incision  Instructions: okay to keep left arm incision open to air                  Follow-up Appointments     Follow Up and recommended labs and tests       Follow up with Dr. Casillas in one months with CT scan prior to appointment                  Your next 10 appointments already scheduled     Nov 30, 2017  3:20 PM CST   CT ABDOMEN PELVIS W/O CONTRAST with UCCT2   [facility] ([facility])    [address] [address] [phone]           Please bring any scans or X-rays taken at other hospitals, if similar tests were done. Also bring a list of your medicines, including vitamins, minerals and over-the-counter drugs. It is safest to leave personal items at home.  Be sure to tell your doctor:   If you have any allergies.   If there s any chance you are pregnant.   If you are  breastfeeding.   If you have any special needs.  You may have contrast for this exam. To prepare:   Do not eat or drink for 2 hours before your exam. If you need to take medicine, you may take it with small sips of water. (We may ask you to take liquid medicine as well.)   The day before your exam, drink extra fluids at least six 8-ounce glasses (unless your doctor tells you to restrict your fluids).  Patients over 70 or patients with diabetes or kidney problems:   If you haven t had a blood test (creatinine test) within the last 30 days, go to your clinic or Diagnostic Imaging Department for this test.  If you have diabetes:   If your kidney function is normal, continue taking your metformin (Avandamet, Glucophage, Glucovance, Metaglip) on the day of your exam.   If your kidney function is abnormal, wait 48 hours before restarting this medicine.  You will have oral contrast for this exam:   You will drink the contrast at home. Get this from your clinic or Diagnostic Imaging Department. Please follow the directions given.  Please wear loose clothing, such as a sweat suit or jogging clothes. Avoid snaps, zippers and other metal. We may ask you to undress and put on a hospital gown.  If you have any questions, please call the Imaging Department where you will have your exam.            Nov 30, 2017  3:45 PM CST   (Arrive by 3:30 PM)   New Vascular Visit with Laura Casillas MD   [facility] ([facility])    [address] [place] [address] [phone]              Additional Services     Occupational Therapy Adult Consult       Evaluate and treat as clinically indicated.    Reason: stroke rehab            Physical Therapy Adult Consult       Evaluate and treat as clinically indicated.    Reason:  Stroke rehab                  Future tests that were ordered for you     CT Abdomen Pelvis w/o Contrast       Administration of IV contrast (contrast agent, dose,  "and amount) will be tailored to this examination per the appropriate written protocol listed in the Protocol E-Book, or by the supervising imaging provider.            Red blood cell have available                 Pending Results     No orders found from 10/4/2017 to 10/7/2017.            Statement of Approval     Ordered          10/17/17 0902  I have reviewed and agree with all the recommendations and orders detailed in this document.  EFFECTIVE NOW     Approved and electronically signed by:  Jessica Moya APRN CNS             Admission Information     Date & Time Provider Department Dept. Phone    10/6/2017 Laura Casillas MD [address] [facility] [place] [phone]      Your Vitals Were     Blood Pressure Pulse Temperature Respirations Height Weight    115/64 (BP Location: Right arm) 80 97.5  F (36.4  C) (Axillary) 20 1.727 m (5' 8\") 81.6 kg (180 lb)    Pulse Oximetry BMI (Body Mass Index)                93% 27.37 kg/m2          [employer] Information     [employer] lets you send messages to your doctor, view your test results, renew your prescriptions, schedule appointments and more. To sign up, go to [email or web address] . Click on \"Log in\" on the left side of the screen, which will take you to the Welcome page. Then click on \"Sign up Now\" on the right side of the page.     You will be asked to enter the access code listed below, as well as some personal information. Please follow the directions to create your username and password.     Your access code is: [email or web address]  Expires: 2018  1:31 PM     Your access code will  in 90 days. If you need help or a new code, please call your [facility] clinic or [phone].        Care EveryWhere ID     This is your Care EveryWhere ID. This could be used by other organizations to access your [facility] medical records  [id number]        Equal Access to Services     GRISELDA LYLE AH: Omi Harrington, velia davis, mercedes villalba " carmen kwongvinicius lu la'aan ah. So [facility] [phone].    ATENCIÓN: Si kerri durand, tiene a berry disposición servicios gratuitos de asistencia lingüística. Christopher al [phone].    We comply with applicable federal civil rights laws and [place] laws. We do not discriminate on the basis of race, color, national origin, age, disability, sex, sexual orientation, or gender identity.               Review of your medicines      START taking        Dose / Directions    lidocaine 5 % Patch   Commonly known as:  LIDODERM        Dose:  2 patch   Place 2 patches onto the skin every 24 hours   Quantity:  30 patch   Refills:  0       melatonin 3 MG tablet        Dose:  3 mg   Take 1 tablet (3 mg) by mouth nightly as needed for sleep   Quantity:  30 tablet   Refills:  0       rosuvastatin 40 MG tablet   Commonly known as:  CRESTOR        Dose:  40 mg   Take 1 tablet (40 mg) by mouth daily   Quantity:  30 tablet   Refills:  1       scopolamine 72 hr patch   Commonly known as:  TRANSDERM        Dose:  1 patch   Start taking on:  10/18/2017   Place 1 patch onto the skin every 72 hours   Quantity:  30 patch   Refills:  0         CONTINUE these medicines which have NOT CHANGED        Dose / Directions    ASPIRIN PO        Dose:  81 mg   Take 81 mg by mouth daily   Refills:  0       FISH OIL BURP-LESS PO   Indication:  Unsure of exact formulation.        Dose:  1 capsule   Take 1 capsule by mouth daily   Refills:  0       PROVENTIL  (90 BASE) MCG/ACT Inhaler   Generic drug:  albuterol        Dose:  2 puff   Inhale 2 puffs into the lungs every 6 hours as needed   Refills:  0       VITAMIN D (CHOLECALCIFEROL) PO        Dose:  2000 Units   Take 2,000 Units by mouth daily   Refills:  0       XANAX 0.5 MG tablet   Generic drug:  ALPRAZolam        Dose:  0.25 mg   Take 0.25 mg by mouth nightly as needed for anxiety (Anxiety)   Refills:  0            Where to get your medicines      Some of these will need a paper prescription  and others can be bought over the counter. Ask your nurse if you have questions.     Bring a paper prescription for each of these medications     lidocaine 5 % Patch    melatonin 3 MG tablet    rosuvastatin 40 MG tablet    scopolamine 72 hr patch                Protect others around you: Learn how to safely use, store and throw away your medicines at www.disposemymeds.org.             Medication List: This is a list of all your medications and when to take them. Check marks below indicate your daily home schedule. Keep this list as a reference.      Medications           Morning Afternoon Evening Bedtime As Needed    ASPIRIN PO   Take 81 mg by mouth daily   Last time this was given:  81 mg on 10/17/2017  8:49 AM                                FISH OIL BURP-LESS PO   Take 1 capsule by mouth daily                                lidocaine 5 % Patch   Commonly known as:  LIDODERM   Place 2 patches onto the skin every 24 hours   Last time this was given:  1 patch on 10/16/2017  8:10 AM                                melatonin 3 MG tablet   Take 1 tablet (3 mg) by mouth nightly as needed for sleep   Last time this was given:  3 mg on 10/14/2017  1:06 AM                                PROVENTIL  (90 BASE) MCG/ACT Inhaler   Inhale 2 puffs into the lungs every 6 hours as needed   Last time this was given:  2 puffs on 10/17/2017  8:49 AM   Generic drug:  albuterol                                rosuvastatin 40 MG tablet   Commonly known as:  CRESTOR   Take 1 tablet (40 mg) by mouth daily   Last time this was given:  40 mg on 10/17/2017  8:49 AM                                scopolamine 72 hr patch   Commonly known as:  TRANSDERM   Place 1 patch onto the skin every 72 hours   Start taking on:  10/18/2017   Last time this was given:  1 patch on 10/15/2017  4:27 PM                                VITAMIN D (CHOLECALCIFEROL) PO   Take 2,000 Units by mouth daily                                XANAX 0.5 MG tablet   Take 0.25  mg by mouth nightly as needed for anxiety (Anxiety)   Last time this was given:  0.5 mg on 10/16/2017  8:42 PM   Generic drug:  ALPRAZolam

## 2017-10-06 NOTE — IP AVS SNAPSHOT
"    [facility]: [phone]                                              INTERAGENCY TRANSFER FORM - LAB / IMAGING / EKG / EMG RESULTS   10/6/2017                    Hospital Admission Date: 10/6/2017  CHINO JACKSON   : 1932  Sex: Female        Attending Provider: Laura Casillas MD     Allergies:  Penicillins, Shrimp    Infection:  None   Service:  VASCULAR OFELIA    Ht:  1.727 m (5' 8\")   Wt:  81.6 kg (180 lb)   Admission Wt:  79.4 kg (175 lb)    BMI:  27.37 kg/m 2   BSA:  1.98 m 2            Patient PCP Information     Provider PCP Type    Cecilio Montes PA-C General         Lab Results - 3 Days      Glucose by meter [[id number]] (Abnormal)  Resulted: 10/17/17 0821, Result status: Final result    Ordering provider: Laura Casillas MD  10/17/17 0802 Resulting lab: POINT OF CARE TEST, GLUCOSE    Specimen Information    Type Source Collected On     10/17/17 0802          Components       Value Reference Range Flag Lab   Glucose 121 70 - 99 mg/dL H 170            Glucose by meter [[id number]] (Abnormal)  Resulted: 10/17/17 0216, Result status: Final result    Ordering provider: Laura Casillas MD  10/17/17 0210 Resulting lab: POINT OF CARE TEST, GLUCOSE    Specimen Information    Type Source Collected On     10/17/17 0210          Components       Value Reference Range Flag Lab   Glucose 140 70 - 99 mg/dL H 170            Glucose by meter [[id number]] (Abnormal)  Resulted: 10/16/17 2025, Result status: Final result    Ordering provider: Laura Casillas MD  10/16/17 2013 Resulting lab: POINT OF CARE TEST, GLUCOSE    Specimen Information    Type Source Collected On     10/16/17 2013          Components       Value Reference Range Flag Lab   Glucose 166 70 - 99 mg/dL H 170            Glucose by meter [[id number]] (Abnormal)  Resulted: 10/16/17 163, Result status: Final result    Ordering provider: Laura Casillas MD  10/16/17 1631 Resulting lab: POINT OF CARE TEST, GLUCOSE    Specimen Information    Type Source " Collected On     10/16/17 1631          Components       Value Reference Range Flag Lab   Glucose 128 70 - 99 mg/dL H 170            Glucose by meter [[id number]] (Abnormal)  Resulted: 10/16/17 1200, Result status: Final result    Ordering provider: Laura Casillas MD  10/16/17 1151 Resulting lab: POINT OF CARE TEST, GLUCOSE    Specimen Information    Type Source Collected On     10/16/17 1151          Components       Value Reference Range Flag Lab   Glucose 121 70 - 99 mg/dL H 170            Glucose by meter [[id number]] (Abnormal)  Resulted: 10/16/17 1111, Result status: Final result    Ordering provider: Laura Casillas MD  10/16/17 1108 Resulting lab: POINT OF CARE TEST, GLUCOSE    Specimen Information    Type Source Collected On     10/16/17 1108          Components       Value Reference Range Flag Lab   Glucose 133 70 - 99 mg/dL H 170            Platelet count [[id number]]  Resulted: 10/16/17 0824, Result status: Final result    Ordering provider: Dhaval Carcamo MD  10/16/17 0000 Resulting lab: [facility]    Specimen Information    Type Source Collected On   Blood  10/16/17 0800          Components       Value Reference Range Flag Lab   Platelet Count 246 150 - 450 10e9/L  51            Glucose by meter [[id number]] (Abnormal)  Resulted: 10/16/17 0816, Result status: Final result    Ordering provider: Laura Casillas MD  10/16/17 0807 Resulting lab: POINT OF CARE TEST, GLUCOSE    Specimen Information    Type Source Collected On     10/16/17 0807          Components       Value Reference Range Flag Lab   Glucose 127 70 - 99 mg/dL H 170            Glucose by meter [[id number]] (Abnormal)  Resulted: 10/15/17 2205, Result status: Final result    Ordering provider: Laura Casillas MD  10/15/17 2159 Resulting lab: POINT OF CARE TEST, GLUCOSE    Specimen Information    Type Source Collected On     10/15/17 2159          Components       Value Reference Range Flag Lab   Glucose 112 70 - 99 mg/dL  H 170            Glucose by meter [[id number]] (Abnormal)  Resulted: 10/15/17 1640, Result status: Final result    Ordering provider: Laura Casillas MD  10/15/17 1632 Resulting lab: POINT OF CARE TEST, GLUCOSE    Specimen Information    Type Source Collected On     10/15/17 1632          Components       Value Reference Range Flag Lab   Glucose 147 70 - 99 mg/dL H 170            Glucose by meter [[id number]] (Abnormal)  Resulted: 10/15/17 1205, Result status: Final result    Ordering provider: Laura Casillas MD  10/15/17 1140 Resulting lab: POINT OF CARE TEST, GLUCOSE    Specimen Information    Type Source Collected On     10/15/17 1140          Components       Value Reference Range Flag Lab   Glucose 126 70 - 99 mg/dL H 170            Glucose by meter [[id number]] (Abnormal)  Resulted: 10/15/17 0840, Result status: Final result    Ordering provider: Laura Casillas MD  10/15/17 0803 Resulting lab: POINT OF CARE TEST, GLUCOSE    Specimen Information    Type Source Collected On     10/15/17 0803          Components       Value Reference Range Flag Lab   Glucose 115 70 - 99 mg/dL H 170            Glucose by meter [[id number]] (Abnormal)  Resulted: 10/15/17 0210, Result status: Final result    Ordering provider: Laura Casillas MD  10/15/17 0204 Resulting lab: POINT OF CARE TEST, GLUCOSE    Specimen Information    Type Source Collected On     10/15/17 0204          Components       Value Reference Range Flag Lab   Glucose 121 70 - 99 mg/dL H 170            Glucose by meter [[id number]] (Abnormal)  Resulted: 10/14/17 2130, Result status: Final result    Ordering provider: Laura Casillas MD  10/14/17 2124 Resulting lab: POINT OF CARE TEST, GLUCOSE    Specimen Information    Type Source Collected On     10/14/17 2124          Components       Value Reference Range Flag Lab   Glucose 180 70 - 99 mg/dL H 170            Glucose by meter [[id number]] (Abnormal)  Resulted: 10/14/17 1755, Result status: Final result    Ordering provider:  Laura Casillas MD  10/14/17 1749 Resulting lab: POINT OF CARE TEST, GLUCOSE    Specimen Information    Type Source Collected On     10/14/17 1749          Components       Value Reference Range Flag Lab   Glucose 165 70 - 99 mg/dL H 170            Glucose by meter [[id number]] (Abnormal)  Resulted: 10/14/17 1240, Result status: Final result    Ordering provider: Laura Casillas MD  10/14/17 1236 Resulting lab: POINT OF CARE TEST, GLUCOSE    Specimen Information    Type Source Collected On     10/14/17 1236          Components       Value Reference Range Flag Lab   Glucose 150 70 - 99 mg/dL H 170            Glucose by meter [[id number]] (Abnormal)  Resulted: 10/14/17 0816, Result status: Final result    Ordering provider: Laura Casillas MD  10/14/17 0814 Resulting lab: POINT OF CARE TEST, GLUCOSE    Specimen Information    Type Source Collected On     10/14/17 0814          Components       Value Reference Range Flag Lab   Glucose 133 70 - 99 mg/dL H 170            Glucose by meter [[id number]] (Abnormal)  Resulted: 10/14/17 0230, Result status: Final result    Ordering provider: Laura Casillas MD  10/14/17 0226 Resulting lab: POINT OF CARE TEST, GLUCOSE    Specimen Information    Type Source Collected On     10/14/17 0226          Components       Value Reference Range Flag Lab   Glucose 142 70 - 99 mg/dL H 170            Glucose by meter [[id number]] (Abnormal)  Resulted: 10/13/17 2215, Result status: Final result    Ordering provider: Laura Casillas MD  10/13/17 2205 Resulting lab: POINT OF CARE TEST, GLUCOSE    Specimen Information    Type Source Collected On     10/13/17 2205          Components       Value Reference Range Flag Lab   Glucose 169 70 - 99 mg/dL H 170            Testing Performed By     Lab - Abbreviation Name Director Address Valid Date Range    51 - Unknown [facility] Unknown [address] [facility] [address] 12/31/14 1010 - Present    170 - Unknown POINT OF CARE TEST,  "GLUCOSE Unknown Unknown 10/31/11 1114 - Present            Unresulted Labs (24h ago through future)    Start       Ordered    10/10/17 0600  Platelet count  (heparin- UU,UR,SH,RH,PH,WY)  EVERY THREE DAYS,   Routine     Comments:  Repeat every 3 days while on VTE prophylaxis. If no result is listed, this lab has not been done the past 365 days. LATEST LAB RESULT: Platelet Count (10e9/L)       Date                     Value                 10/06/2017               197              ----------      10/07/17 1932    Unscheduled  Potassium  (Potassium Replacement - \"Standard\" - For K levels less than 3.4 mmol/L - UU,UR,UA,RH,SH,PH,WY )  CONDITIONAL (SPECIFY),   Routine     Comments:  Obtain Potassium Level for these conditions:  *IF no potassium result within 24 hours before initiation of order set, draw potassium level with next lab collect.    *2 HOURS AFTER last IV potassium replacement dose and 4 hours after an oral replacement dose.  *Next morning after potassium dose.     Repeat Potassium Replacement if necessary.    10/10/17 2204    Unscheduled  Magnesium  (Magnesium Replacement -  Adult - \"Standard\" - Replacement for all levels less than 1.6 mg/dL )  CONDITIONAL (SPECIFY),   Routine     Comments:  Obtain Magnesium Level for these conditions:  *IF no magnesium result within 24 hrs before initiation of order set, draw magnesium level with next lab collect.    *2 HOURS AFTER last magnesium replacement dose when magnesium replacement given for level less than 1.6   *Next morning after magnesium dose.     Repeat Magnesium Replacement if necessary.    10/10/17 2204    Unscheduled  Phosphorus  (POTASSIUM Phosphate - \"Standard\" - Replacement for levels less than or equal to 2.4 mg/dL )  CONDITIONAL (SPECIFY),   Routine     Comments:  Obtain Phosphorus Level for these conditions:  *IF no phosphorus result within 24 hrs before initiation of order set, draw phosphorus level with next lab collect.    *2 HOURS AFTER last " phosphorus replacement dose for levels less than 2.0.  *Next morning after phosphorus dose.     Repeat Phosphorus Replacement if necessary.    10/10/17 2204         Imaging Results - 3 Days      XR Chest Port 1 View [[id number]]  Resulted: 10/16/17 1544, Result status: Final result    Ordering provider: Dhaval Carcamo MD  10/16/17 1014 Resulted by: Gabriela Jeffries MD Brydone-Jack, Michael James, MD    Performed: 10/16/17 1400 - 10/16/17 1400 Resulting lab: RADIOLOGY RESULTS    Narrative:       Exam: XR CHEST PORT 1 VW, 10/16/2017 2:00 PM    Indication: Evaluate for collection    Comparison: Chest radiograph 10/11/2017    Findings:   Single AP view of the chest. The trachea is midline. The cardiac  silhouette is within normal limits. No focal airspace opacity.  Interval improvement in trace bilateral pleural effusions and  associated atelectasis and/or consolidation. The visualized upper  abdomen, osseous structures and soft tissues are unremarkable.      Impression:       Impression: Interval improvement in bilateral trace pleural effusions  and associated atelectasis and/or consolidation. No focal airspace  opacity or consolidation.    I have personally reviewed the examination and initial interpretation  and I agree with the findings.    GABRIELA JEFFRIES MD      US Carotid Bilateral [[id number]]  Resulted: 10/14/17 1427, Result status: Final result    Ordering provider: Dhaval Carcamo MD  10/13/17 1601 Resulted by: Gabriela Jeffries MD Markese, Matthew, MD    Performed: 10/14/17 0903 - 10/14/17 1045 Resulting lab: RADIOLOGY RESULTS    Narrative:       EXAMINATION: US CAROTID BILATERAL, 10/14/2017 10:45 AM     COMPARISON: None.    HISTORY: Evaluate carotid stenosis    TECHNIQUE:  Gray-scale, color Doppler and spectral flow analysis.    Findings:    Right side:     Plaque: No significant atherosclerotic plaque identified at the right  carotid bulb.       Proximal CCA: 84/12 cm/sec     Mid CCA: 81/10  cm/sec     Distal CCA: 71/13 cm/sec     External CA: 76/4 cm/sec       Proximal ICA: 66/17 cm/sec     Mid ICA: 115/26 cm/sec     Distal ICA: 97/30 cm/sec       Vert: antegrade, 97/22 cm/sec     ICA/CCA ratio: 1.62     Left side:     Plaque: No significant atherosclerotic plaque identified at the left  carotid bulb..       Proximal CCA: 83/6 cm/sec     Mid CCA: 80/10 cm/sec     Distal CCA: 68/9 cm/sec     External CA: 163/0 cm/sec       Proximal ICA: 62/13 cm/sec     Mid ICA: 66/20 cm/sec     Distal ICA: 104/28 cm/sec       Vertebral Artery: antegrade, 28/8 cm/sec    ICA/CCA ratio: 1.53         Impression:       Impression:  1.  Right: The max velocity in the ICA measures 115/26, corresponding  to no hemodynamically significant stenosis.  2.  Left: The max velocity in the ICA measures 104/28, corresponding  to no hemodynamically significant stenosis.  3.  Normal antegrade direction flow in both vertebral arteries.      Consensus Panel Gray-Scale and Doppler US Criteria for Diagnosis of  ICA Stenosis (Radiology 11/2003) additionally modified by Leigh et  al. in Journal of Vascular Surgery 1/2011, [phone])87-73.       Normal         ICA PSV < 140 cm/sec       Plaque Estimate None       ICA/CCA  PSV Ratio < 2.0       ICA EDV < 40 cm/sec       < 50%          ICA PSV < 140 cm/sec       Plaque Estimate < 50%       ICA/CCA  PSV Ratio < 2.0       ICA EDV < 40 cm/sec       50- 69%       ICA -230 cm/sec       Plaque Estimate > or = 50%       ICA/CCA PSV Ratio 2.0-4.0       ICA EDV  cm/sec         > or = 70%, less than near occlusion       ICA PSV > 230 cm/sec       Plaque Estimate > or = 50%       ICA/CCA Ratio > 4.0       ICA EDV > 100 cm/sec                                            Additional criteria from vascular surgery     > 80%       EDV > 120 cm/sec     I have personally reviewed the examination and initial interpretation  and I agree with the findings.    GABRIELA JEFFRIES MD      Testing Performed By      Lab - Abbreviation Name Director Address Valid Date Range    104 - Rad Rslts RADIOLOGY RESULTS Unknown Unknown 05 1553 - Present               ECG/EMG Results      Echo limited (Adults Only) [[id number]]  Resulted: 10/14/17 0957, Result status: In process    Ordering provider: Dhaval Carcamo MD  10/13/17 1452 Performed: 10/14/17 0957 - 10/14/17 0957    Resulting lab: RADIANT             ECHO BUBBLE STUDY LIMITED WITH OPTISON [[id number]]  Resulted: 10/14/17 1006, Result status: Edited Result - FINAL    Ordering provider: Dhaval Carcamo MD  10/13/17 1452 Resulted by: Akua Toure MD    Performed: 10/14/17 0957 - 10/14/17 105 Resulting lab: RADIOLOGY RESULTS    Narrative:       [id number]  [id number]  [id number]  [id number]^OSVALDO^DHAVAL^TYSON           [facility],[place]  Echocardiography Laboratory  [address] [place] [id number]     Name: CHINO JACKSON  MRN: [id number]  : 1932  Study Date: 10/14/2017 10:06 AM  Age: 84 yrs  Gender: Female  Patient Location: [facility]  Reason For Study: Cerebrovascular Incident  Ordering Physician: DHAVAL CARCAMO  Referring Physician: SELF, REFERRED  Performed By: Lima Thrasher RDCS     BSA: 2.0 m2  Height: 68 in  Weight: 180 lb  BP: 140/68 mmHg  _____________________________________________________________________________  __        Procedure  Bubble Echocardiogram with two-dimensional, color and spectral Doppler  performed. Contrast Optison. Echocardiogram with two-dimensional, color and  spectral Doppler performed. Technically difficult study. Poor acoustic  windows. Optison (NDC #7005-0001-05) given intravenously. Patient was given 7  ml mixture of 3 ml Optison and 6 ml saline. 3 ml wasted.  _____________________________________________________________________________  __        Interpretation Summary  Technically difficult study. Poor acoustic windows.  Global and regional left ventricular function is normal with an EF of 55-60%.  Global  right ventricular function is normal.  No significant valvular abnormalities were noted.  There was no shunt at the atrial septal level as assessed by color Doppler and  agitated saline bubble study at rest and with Valsalva maneuver.  The inferior vena cava was normal in size with preserved respiratory  variability.  No pericardial effusion is present.  _____________________________________________________________________________  __        Left Ventricle  Left ventricular size is normal. Left ventricular wall thickness is normal.  Global and regional left ventricular function is normal with an EF of 55-60%.  Normal left ventricular filling for age. No regional wall motion abnormalities  are seen.     Right Ventricle  The right ventricle is normal size. Global right ventricular function is  normal.     Atria  Both atria appear normal. There was no shunt at the atrial septal level as  assessed by color Doppler and agitated saline bubble study at rest and with  Valsalva maneuver.     Mitral Valve  Mild mitral annular calcification is present.        Aortic Valve  Mild aortic valve sclerosis is present.     Tricuspid Valve  The tricuspid valve is normal. The peak velocity of the tricuspid regurgitant  jet is not obtainable. Pulmonary artery systolic pressure cannot be assessed.     Pulmonic Valve  The pulmonic valve is normal.     Vessels  The aorta root is normal. The inferior vena cava was normal in size with  preserved respiratory variability.     Pericardium  No pericardial effusion is present.        Compared to Previous Study  Previous study not available for comparison.  _____________________________________________________________________________  __  MMode/2D Measurements & Calculations     LA Volume (BP): 44.9 ml  LA Volume Index (BP): 23.0 ml/m2        Doppler Measurements & Calculations  MV E max tamie: 81.5 cm/sec  MV A max tamie: 80.5 cm/sec  MV E/A: 1.0  MV dec time: 0.23 sec         _____________________________________________________________________________  __        Report approved by: Lorena Mosher 10/14/2017 02:13 PM       1    Type Source Collected On     10/14/17 1006          View Image (below)              Encounter-Level Documents:     There are no encounter-level documents.      Order-Level Documents:     There are no order-level documents.

## 2017-10-06 NOTE — IP AVS SNAPSHOT
` `     [facility] BANK: [phone]            Medication Administration Report for Sandy Sheffield as of 10/17/17 1239   Legend:    Given Hold Not Given Due Canceled Entry Other Actions    Time Time (Time) Time  Time-Action       Inactive    Active    Linked        Medications 10/11/17 10/12/17 10/13/17 10/14/17 10/15/17 10/16/17 10/17/17    albuterol (PROAIR HFA/PROVENTIL HFA/VENTOLIN HFA) Inhaler 2 puff  Dose: 2 puff Freq: 4 TIMES DAILY PRN Route: IN  PRN Reason: other  PRN Comment: dyspnea  Start: 10/07/17 0147      0008 (2 puff)-Given        0629 (2 puff)-Given       1956 (2 puff)-Given        0516 (2 puff)-Given       2254 (2 puff)-Given        0433 (2 puff)-Given            albuterol (PROAIR HFA/PROVENTIL HFA/VENTOLIN HFA) Inhaler 2 puff  Dose: 2 puff Freq: 4 TIMES DAILY Route: IN  Start: 10/07/17 0800    0759 (2 puff)-Given       1335 (2 puff)-Given       1651 (2 puff)-Given       2025 (2 puff)-Given        (0759)-Not Given       0905 (2 puff)-Given       (1132)-Not Given [C]       1352 (2 puff)-Given       (1525)-Not Given       2043 (2 puff)-Given        0842 (2 puff)-Given       (1200)-Not Given       1740 (2 puff)-Given       2012 (2 puff)-Given        0945 (2 puff)-Given       1316 (2 puff)-Given       (1702)-Not Given       1951 (2 puff)-Given        0854 (2 puff)-Given       1153 (2 puff)-Given       1614 (2 puff)-Given       1935 (2 puff)-Given        0855 (2 puff)-Given       (1108)-Not Given       1555 (2 puff)-Given       1935 (2 puff)-Given        0849 (2 puff)-Given       1230 (2 puff)-Given       [ ] 1600       [ ] 2000           albuterol neb solution 2.5 mg  Dose: 2.5 mg Freq: ONCE PRN Route: NEBULIZATION  PRN Reasons: wheezing,other  PRN Comment: dyspnea  Start: 10/16/17 0212              ALPRAZolam (XANAX) tablet 0.5 mg  Dose: 0.5 mg Freq: AT BEDTIME PRN Route: PO  PRN Reason: anxiety  PRN Comment: Anxiety  Start: 10/07/17 1932   Admin Instructions: Avoid taking with grapefruit  juice     2034 (0.5 mg)-Given        2043 (0.5 mg)-Given        2016 (0.5 mg)-Given        2243 (0.5 mg)-Given        1934 (0.5 mg)-Given        2042 (0.5 mg)-Given            alum & mag hydroxide-simethicone (MYLANTA ES/MAALOX  ES) suspension 30 mL  Dose: 30 mL Freq: EVERY 4 HOURS PRN Route: PO  PRN Reason: indigestion  Start: 10/07/17 1932   Admin Instructions: Shake well.               aspirin EC EC tablet 81 mg  Dose: 81 mg Freq: DAILY Route: PO  Start: 10/13/17 1500   Admin Instructions: DO NOT CRUSH.       1739 (81 mg)-Given        0947 (81 mg)-Given        0855 (81 mg)-Given        0758 (81 mg)-Given        0849 (81 mg)-Given           glucose 40 % gel 15-30 g  Dose: 15-30 g Freq: EVERY 15 MIN PRN Route: PO  PRN Reason: low blood sugar  Start: 10/07/17 0140   Admin Instructions: Give 15 g for BG 51 to 69 mg/dL IF patient is conscious and able to swallow. Give 30 g for BG less than or equal to 50 mg/dL IF patient is conscious and able to swallow. Do NOT give glucose gel via enteral tube.  IF patient has enteral tube: give apple juice 120 mL (4 oz or 15 g of CHO) via enteral tube for BG 51 to 69 mg/dL.  Give apple juice 240 mL (8 oz or 30 g of CHO) via enteral tube for BG less than or equal to 50 mg/dL.    ~Oral gel is preferable for conscious and able to swallow patient.   ~IF gel unavailable or patient refuses may provide apple juice 120 mL (4 oz or 15 g of CHO). Document juice on I and O flowsheet.              Or  dextrose 50 % injection 25-50 mL  Dose: 25-50 mL Freq: EVERY 15 MIN PRN Route: IV  PRN Reason: low blood sugar  Start: 10/07/17 0140   Admin Instructions: Use if have IV access, BG less than 70 mg/dL and meet dose criteria below:  Dose if conscious and alert (or disorientated) and NPO = 25 mL  Dose if unconscious / not alert = 50 mL  Vesicant.              Or  glucagon injection 1 mg  Dose: 1 mg Freq: EVERY 15 MIN PRN Route: SC  PRN Reason: low blood sugar  PRN Comment: May repeat x 1 only  Start:  10/07/17 0140   Admin Instructions: May give SQ or IM. ONLY use glucagon IF patient has NO IV access AND is UNABLE to swallow AND blood glucose is LESS than or EQUAL to 50 mg/dL.               guaiFENesin (ROBITUSSIN) 20 mg/mL solution 10 mL  Dose: 10 mL Freq: EVERY 4 HOURS PRN Route: PO  PRN Reason: cough  Start: 10/15/17 1058        1153 (10 mL)-Given       1613 (10 mL)-Given        1236 (10 mL)-Given            insulin aspart (NovoLOG) inj (RAPID ACTING)  Dose: 1-3 Units Freq: AT BEDTIME Route: SC  Start: 10/07/17 0145   Admin Instructions: LOW INSULIN RESISTANCE DOSING    Do Not give Bedtime Correction Insulin if BG less than 200.   For  - 299 give 1 unit.   For  - 399 give 2 units   For BG greater than or equal 400 give 3 units.   Notify provider if glucose greater than or equal to 350 mg/dL after administration of correction dose.  If given at mealtime, must be administered 5 min before meal or immediately after.     (2241)-Not Given        (2209)-Not Given        (2220)-Not Given [C]        (2146)-Not Given [C]        (2209)-Not Given [C]        (2155)-Not Given        [ ] 2200           insulin aspart (NovoLOG) inj (RAPID ACTING)  Dose: 1-3 Units Freq: 3 TIMES DAILY BEFORE MEALS Route: SC  Start: 10/07/17 0730   Admin Instructions: Correction Scale - LOW INSULIN RESISTANCE DOSING     Do Not give Correction Insulin if Pre-Meal BG less than 140.   For Pre-Meal  - 239 give 1 unit.   For Pre-Meal  - 339 give 2 units.   For Pre-Meal BG greater than or equal to 340 give 3 units.   To be given with prandial insulin, and based on pre-meal blood glucose.   Notify provider if glucose greater than or equal to 350 mg/dL after administration of correction dose.  If given at mealtime, must be administered 5 min before meal or immediately after.     (0758)-Not Given       (1337)-Not Given [C]       (2013)-Not Given        (0753)-Not Given [C]       (1417)-Not Given [C]       (1739)-Not Given [C]         (0837)-Not Given [C]       (1200)-Not Given       (1810)-Not Given        (0827)-Not Given [C]       (1238)-Not Given [C]       (1756)-Not Given [C]        (0856)-Not Given       (1146)-Not Given       (1641)-Not Given        (0808)-Not Given       (1108)-Not Given       (1632)-Not Given        (0847)-Not Given [C]       (1229)-Not Given [C]       [ ] 1700           lidocaine (LIDODERM) 5 % Patch 2 patch  Dose: 2 patch Freq: EVERY 24 HOURS 0800 Route: TD  Start: 10/11/17 0845   Admin Instructions: Apply patch(s) to right side. To prevent lidocaine toxicity, patient should be patch free for 12 hrs daily. Patches may be cut to smaller size prior to removing release liner.  NEVER APPLY HEAT OVER PATCH which will increase absorption and may lead to risk of local anesthetic toxicity. Do not apply over area where liposomal bupivacaine was injected for 96 hours post injection.     0907 (2 patch)-Given        0754 (2 patch)-Given [C]        0842 (2 patch)-Given        0952 (2 patch)-Given        (0856)-Not Given        0810 (1 patch)-Given        (0849)-Not Given           lidocaine (LIDODERM) patch in PLACE  Freq: EVERY 8 HOURS Route: TD  Start: 10/11/17 0845   Admin Instructions: Chart every shift, confirming that patch is still in place on patient (no barcode scan needed). See patch order for dose information.  NEVER APPLY HEAT OVER PATCH which will increase absorption and may lead to risk of local anesthetic toxicity. Do not apply over area where liposomal bupivacaine injected for 96 hours.     1232 ( )-Patch in Place       2014 ( )-Given        0045 ( )-Negative [C]       0759 ( )-Patch in Place       1719 ( )-Patch in Place        (0040)-Not Given       0842 ( )-Patch in Place       1651 ( )-Patch in Place        0014 ( )-Patch Removed       1000 ( )-Patch in Place       1702 ( )-Patch in Place        0010 ( )-Patch Removed       (0856)-Not Given               (0210)-Not Given       0814 ( )-Patch in Place      "  1552 ( )-Patch in Place        0010 ( )-Patch Removed       (0851)-Not Given       [ ] 1645           lidocaine (LIDODERM) patch REMOVAL  Freq: EVERY 24 HOURS 2000 Route: TD  Start: 10/11/17 2000   Admin Instructions: Patient should have a 12 hour patch free interval     2015 ( )-Given        2047 ( )-Patch Removed        2012 ( )-Patch Removed        1956 ( )-Patch Removed        (1951)-Not Given        1900 ( )-Patch Removed        [ ] 2000           lidocaine (LMX4) kit  Freq: EVERY 1 HOUR PRN Route: Top  PRN Reason: pain  PRN Comment: with VAD insertion or accessing implanted port.  Start: 10/07/17 1932   Admin Instructions: Do NOT give if patient has a history of allergy to any local anesthetic or any \"giselle\" product.   Apply 30 minutes prior to VAD insertion or port access.  MAX Dose:  2.5 g (  of 5 g tube)               lidocaine 1 % 1 mL  Dose: 1 mL Freq: EVERY 1 HOUR PRN Route: OTHER  PRN Comment: mild pain with VAD insertion or accessing implanted port  Start: 10/07/17 1932   Admin Instructions: Do NOT give if patient has a history of allergy to any local anesthetic or any \"giselle\" product. MAX dose 1 mL subcutaneous OR intradermal in divided doses.               magnesium sulfate 4 g in 100 mL sterile water (premade)  Dose: 4 g Freq: EVERY 4 HOURS PRN Route: IV  PRN Reason: magnesium supplementation  Start: 10/10/17 2203   Admin Instructions: For serum Mg++ less than 1.6 mg/dL  Give 4 g and recheck magnesium level 2 hours after dose, and next AM.               melatonin tablet 3 mg  Dose: 3 mg Freq: AT BEDTIME PRN Route: PO  PRN Reason: sleep  Start: 10/07/17 0145       0106 (3 mg)-Given              multivitamin, therapeutic (THERA-VIT) tablet 1 tablet  Dose: 1 tablet Freq: DAILY Route: PO  Start: 10/16/17 1215         1426 (1 tablet)-Given        0849 (1 tablet)-Given           naloxone (NARCAN) injection 0.1-0.4 mg  Dose: 0.1-0.4 mg Freq: EVERY 2 MIN PRN Route: IV  PRN Reason: opioid reversal  Start: " 10/06/17 2251   Admin Instructions: For respiratory rate LESS than or EQUAL to 8.  Partial reversal dose:  0.1 mg titrated q 2 minutes for Analgesia Side Effects Monitoring Sedation Level of 3 (frequently drowsy, arousable, drifts to sleep during conversation).Full reversal dose:  0.4 mg bolus for Analgesia Side Effects Monitoring Sedation Level of 4 (somnolent, minimal or no response to stimulation).               ondansetron (ZOFRAN-ODT) ODT tab 4-8 mg  Dose: 4-8 mg Freq: EVERY 6 HOURS PRN Route: PO  PRN Reasons: nausea,vomiting  Start: 10/08/17 0945   Admin Instructions: This is Step 1 of nausea and vomiting management.  If nausea not resolved in 15 minutes, go to Step 2 prochlorperazine (COMPAZINE). Do not push through foil backing. Peel back foil and gently remove. Place on tongue immediately. Administration with liquid unnecessary     0844 (4 mg)-Given          0106 (4 mg)-Given        1628 (4 mg)-Given       2245 (8 mg)-Given        1550 (4 mg)-Given           Or  ondansetron (ZOFRAN) injection 4-8 mg  Dose: 4-8 mg Freq: EVERY 6 HOURS PRN Route: IV  PRN Reasons: nausea,vomiting  Start: 10/08/17 0945   Admin Instructions: This is Step 1 of nausea and vomiting management.  If nausea not resolved in 15 minutes, go to Step 2 prochlorperazine (COMPAZINE).  Irritant.                                                  polyethylene glycol (MIRALAX/GLYCOLAX) Packet 17 g  Dose: 17 g Freq: 2 TIMES DAILY PRN Route: PO  PRN Reason: constipation  Start: 10/15/17 1703   Admin Instructions: 1 Packet = 17 grams. Mixed prescribed dose in 8 ounces of water. Follow with 8 oz. of water.         1834 (17 g)-Given             potassium chloride (KLOR-CON) Packet 20-40 mEq  Dose: 20-40 mEq Freq: EVERY 2 HOURS PRN Route: ORAL OR FEED  PRN Reason: potassium supplementation  Start: 10/10/17 2203   Admin Instructions: Use if unable to tolerate tablets.  If Serum K+ 3.0-3.3, dose = 60 mEq po total dose (40 mEq x1 followed in 2 hours by 20  mEq x1). Recheck K+ level 4 hours after dose and the next AM.  If Serum K+ 2.5-2.9, dose = 80 mEq po total dose (40 mEq Q2H x2). Recheck K+ level 4 hours after dose and the next AM.  If Serum K+ less than 2.5, See IV order.  Dissolve packet contents in 4-8 ounces of cold water or juice.               potassium chloride 10 mEq in 100 mL intermittent infusion  Dose: 10 mEq Freq: EVERY 1 HOUR PRN Route: IV  PRN Reason: potassium supplementation  Start: 10/10/17 2203   Admin Instructions: Infuse via PERIPHERAL LINE or CENTRAL LINE. Use for central line replacement if patient weight less than 65 kg, if patient is on TPN with high potassium content or if unit does not stock 20 mEq bags.   If Serum K+ 3.0-3.3, dose = 10 mEq/hr x4 doses (40 mEq IV total dose). Recheck K+ level 2 hours after dose and the next AM.   If Serum K+ less than 3.0, dose = 10 mEq/hr x6 doses (60 mEq IV total dose). Recheck K+ level 2 hours after dose and the next AM.               potassium chloride 10 mEq in 100 mL intermittent infusion with 10 mg lidocaine  Dose: 10 mEq Freq: EVERY 1 HOUR PRN Route: IV  PRN Reason: potassium supplementation  Last Dose: 10 mEq (10/11/17 0844)  Start: 10/10/17 2203   Admin Instructions: Infuse via PERIPHERAL LINE. Use potassium with lidocaine for pain with peripheral administration.  If Serum K+ 3.0-3.3, dose = 10 mEq/hr x4 doses (40 mEq IV total dose). Recheck K+ level 2 hours after dose and the next AM.  If Serum K+ less than 3.0, dose = 10 mEq/hr x6 doses (60 mEq IV total dose). Recheck K+ level 2 hours after dose and the next AM.     0431 (10 mEq)-New Bag       0534 (10 mEq)-New Bag       0634 (10 mEq)-New Bag       0844 (10 mEq)-New Bag                 potassium chloride SA (K-DUR/KLOR-CON M) CR tablet 20-40 mEq  Dose: 20-40 mEq Freq: EVERY 2 HOURS PRN Route: PO  PRN Reason: potassium supplementation  Start: 10/10/17 2203   Admin Instructions: Use if able to take PO.   If Serum K+ 3.0-3.3, dose = 60 mEq po  total dose (40 mEq x1 followed in 2 hours by 20 mEq x1). Recheck K+ level 4 hours after dose and the next AM.  If Serum K+ 2.5-2.9, dose = 80 mEq po total dose (40 mEq Q2H x2). Recheck K+ level 4 hours after dose and the next AM.  If Serum K+ less than 2.5, See IV order.  DO NOT CRUSH.               potassium phosphate 15 mmol in D5W 250 mL intermittent infusion  Dose: 15 mmol Freq: DAILY PRN Route: IV  PRN Reason: phosphorous supplementation  Start: 10/10/17 2203   Admin Instructions: For serum phosphorus level 2-2.4  Do not infuse Phosphorus in the same line as TPN.   Give 15 mmol and recheck phosphorus level next AM.               potassium phosphate 20 mmol in D5W 250 mL intermittent infusion  Dose: 20 mmol Freq: EVERY 6 HOURS PRN Route: IV  PRN Reason: phosphorous supplementation  Start: 10/10/17 2203   Admin Instructions: For serum phosphorus level 1.1-1.9  For CENTRAL Line ONLY  Do not infuse Phosphorus in the same line as TPN.   Give 20 mmol and recheck phosphorus level 2 hours after last dose and next AM.               potassium phosphate 20 mmol in D5W 500 mL intermittent infusion  Dose: 20 mmol Freq: EVERY 6 HOURS PRN Route: IV  PRN Reason: phosphorous supplementation  Start: 10/10/17 2203   Admin Instructions: For serum phosphorus level 1.1-1.9  For Peripheral Line  Do not infuse Phosphorus in the same line as TPN.   Give 20 mmol and recheck phosphorus level 2 hours after last dose and next AM.               potassium phosphate 25 mmol in D5W 500 mL intermittent infusion  Dose: 25 mmol Freq: EVERY 8 HOURS PRN Route: IV  PRN Reason: phosphorous supplementation  Start: 10/10/17 2203   Admin Instructions: For serum phosphorus level less than 1.1  Do not infuse Phosphorus in the same line as TPN.   Give 25 mmol and recheck phosphorus level 2 hours after last dose and next AM.               prochlorperazine (COMPAZINE) injection 5-10 mg  Dose: 5-10 mg Freq: EVERY 6 HOURS PRN Route: IV  PRN Reasons:  nausea,vomiting  Start: 10/07/17 2017   Admin Instructions: Use 2nd line after ondasetron               rosuvastatin (CRESTOR) tablet 40 mg  Dose: 40 mg Freq: DAILY Route: PO  Start: 10/13/17 1500      1739 (40 mg)-Given        0947 (40 mg)-Given        0855 (40 mg)-Given        0758 (40 mg)-Given        0849 (40 mg)-Given           scopolamine (TRANSDERM-SCOP) Patch in Place  Freq: EVERY 8 HOURS Route: TD  Start: 10/17/17 0615   Admin Instructions: Chart every shift, confirming that patch is still in place on patient (no barcode scan needed). See patch order for dose information.           0526 ( )-Patch in Place       [ ] 1415       [ ] 2215           sodium chloride (PF) 0.9% PF flush 3 mL  Dose: 3 mL Freq: EVERY 8 HOURS Route: IK  Start: 10/07/17 1945   Admin Instructions: And Q1H PRN, to lock peripheral IV dormant line.     (0429)-Not Given       (1232)-Not Given       (2018)-Not Given        (0405)-Not Given       (1133)-Not Given       (1944)-Not Given        (0348)-Not Given       (1106)-Not Given       2013 (3 mL)-Given        (0413)-Not Given       (1244)-Not Given       (1956)-Not Given        (0402)-Not Given       (1146)-Not Given       (1952)-Not Given        0809 (3 mL)-Given       1552 (3 mL)-Given        (0011)-Not Given       (1150)-Not Given       [ ] 1600           sodium chloride (PF) 0.9% PF flush 3 mL  Dose: 3 mL Freq: EVERY 1 HOUR PRN Route: IK  PRN Reason: line flush  PRN Comment: for peripheral IV flush post IV meds  Start: 10/07/17 1932             Future Medications  Medications 10/11/17 10/12/17 10/13/17 10/14/17 10/15/17 10/16/17 10/17/17       scopolamine (TRANSDERM) 1 MG/3DAYS 72 hr patch 1 patch  Dose: 1 patch Freq: EVERY 72 HOURS Route: TD  Start: 10/18/17 1630   Admin Instructions: Apply patch to skin, behind ear.  Remove every 72 hours.  Each 1.5 mg patch delivers 1 mg of scopolamine.              And  scopolamine (TRANSDERM-SCOP) patch REMOVAL  Freq: EVERY 72 HOURS Route:  TD  Start: 10/18/17 1630   Admin Instructions: Nurse may need to adjust schedule time to match new patch application time.  Old patch should be removed when new patch is applied.              Completed Medications  Medications 10/11/17 10/12/17 10/13/17 10/14/17 10/15/17 10/16/17 10/17/17         Dose: 325-650 mg Freq: ONCE PRN Route: PO  PRN Reasons: mild pain,fever,headaches  Start: 10/16/17 0215   End: 10/16/17 0441   Admin Instructions: Maximum acetaminophen dose from all sources = 75 mg/kg/day not to exceed 4 grams/day.          0441 (650 mg)-Given              Dose: 975 mg Freq: ONCE Route: PO  Start: 10/16/17 1730   End: 10/16/17 1745   Admin Instructions: Maximum acetaminophen dose from all sources = 75 mg/kg/day not to exceed 4 grams/day.          1745 (975 mg)-Given              Dose: 20 mg Freq: ONCE Route: IV  Start: 10/16/17 1030   End: 10/16/17 1048         1048 (20 mg)-Given           Discontinued Medications  Medications 10/11/17 10/12/17 10/13/17 10/14/17 10/15/17 10/16/17 10/17/17         Dose: 20 mEq Freq: EVERY 1 HOUR PRN Route: IV  PRN Reason: potassium supplementation  Start: 10/10/17 2203   End: 10/16/17 1100   Admin Instructions: Infuse via CENTRAL LINE Only. May need EKG if less than 65 kg or on TPN - Max rate is 0.3 mEq/kg/hr for patients not on EKG monitoring.   If Serum K+ 3.0-3.3, dose = 20 mEq/hr x2 doses (40 mEq IV total dose). Recheck K+ level 2 hours after dose and the next AM.  If Serum K+ less than 3.0, dose = 20 mEq/hr x3 doses (60 mEq IV total dose). Recheck K+ level 2 hours after dose and the next AM.          1100-Med Discontinued          Dose: 1 patch Freq: EVERY 72 HOURS Route: TD  Start: 10/10/17 2215   End: 10/16/17 2218   Admin Instructions: Apply patch to skin, behind ear.  Remove every 72 hours.  Each 1.5 mg patch delivers 1 mg of scopolamine.      0755 (1 patch)-Given [C]       2251 (1 patch)-Given [C]          1627 (1 patch)-Given [C]        2218-Med Discontinued        And    Freq: EVERY 8 HOURS Route: TD  Start: 10/10/17 2215   End: 10/16/17 2218   Admin Instructions: Chart every shift, confirming that patch is still in place on patient (no barcode scan needed). See patch order for dose information.     0021 ( )-Patch in Place       0636 ( )-Patch in Place       (1531)-Not Given [C]       (2241)-Not Given        0602 ( )-Negative [C]       1408 ( )-Patch in Place       2252 ( )-Given        0602 ( )-Patch in Place       1503 ( )-Patch in Place       2225 ( )-Patch in Place        0537 ( )-Patch Removed              (2146)-Not Given [C]        (0552)-Not Given [C]       (1421)-Not Given       2243 ( )-Patch in Place        0554 ( )-Patch in Place       1550 ( )-Patch in Place       2218-Med Discontinued             And    Freq: EVERY 72 HOURS Route: TD  Start: 10/13/17 2215   End: 10/16/17 2218   Admin Instructions: Nurse may need to adjust schedule time to match new patch application time.  Old patch should be removed when new patch is applied.       2225 ( )-Patch Removed          2218-Med Discontinued

## 2017-10-06 NOTE — IP AVS SNAPSHOT
[address] [address]    Phone:  [phone]                                       After Visit Summary   10/6/2017    Sandy Sheffield    MRN: [id number]           After Visit Summary Signature Page     I have received my discharge instructions, and my questions have been answered. I have discussed any challenges I see with this plan with the nurse or doctor.    ..........................................................................................................................................  Patient/Patient Representative Signature      ..........................................................................................................................................  Patient Representative Print Name and Relationship to Patient    ..................................................               ................................................  Date                                            Time    ..........................................................................................................................................  Reviewed by Signature/Title    ...................................................              ..............................................  Date                                                            Time

## 2017-10-06 NOTE — IP AVS SNAPSHOT
"` `     [address] [facility]: [phone]                                              INTERAGENCY TRANSFER FORM - NURSING   10/6/2017                    Hospital Admission Date: 10/6/2017  CHINO JACKSON   : 1932  Sex: Female        Attending Provider: Laura Casillas MD     Allergies:  Penicillins, Shrimp    Infection:  None   Service:  VASCULAR OFELIA    Ht:  1.727 m (5' 8\")   Wt:  81.6 kg (180 lb)   Admission Wt:  79.4 kg (175 lb)    BMI:  27.37 kg/m 2   BSA:  1.98 m 2            Patient PCP Information     Provider PCP Type    Cecilio Montes PA-C General      Current Code Status     Date Active Code Status Order ID Comments User Context       Prior      Code Status History     Date Active Date Inactive Code Status Order ID Comments User Context    10/17/2017  9:29 AM  Full Code [id number]  Jessica Moya APRN CNS Outpatient    10/7/2017  7:32 PM 10/17/2017  9:29 AM Full Code [id number]  Dhaval Carcamo MD Inpatient    10/6/2017 10:54 PM 10/7/2017  7:32 PM Full Code [id number]  Jomar Sifuentes MD Inpatient      Advance Directives        Does patient have a scanned Advance Directive/ACP document in EPIC?           No        Hospital Problems as of 10/17/2017              Priority Class Noted POA    AAA (abdominal aortic aneurysm) (H) Medium  10/6/2017 Yes    Abdominal aortic aneurysm (H) Medium  10/7/2017 Yes      Non-Hospital Problems as of 10/17/2017     None      Immunizations     None         END      ASSESSMENT     Discharge Profile Flowsheet     DISCHARGE NEEDS ASSESSMENT     Inspection of bony prominences  Full 10/17/17 0238    Equipment Currently Used at Home  grab bar;shower chair;walker, rolling 10/13/17 1138   Full except areas not inspected   Occiput 10/12/17 1216    Transportation Available  family or friend will provide 10/13/17 1138   Inspection under devices  Full 10/17/17 0238    GASTROINTESTINAL (ADULT,PEDIATRIC,OB)     Skin WDL  ex 10/17/17 0238    GI WDL  ex 10/17/17 0238   Skin " "Temperature  warm 10/17/17 0238    Abdominal Appearance  contour irregular 10/17/17 0238   Skin Moisture  moist 10/17/17 0238    Last Bowel Movement  -- (pt stated a week ago) 10/15/17 2133   Skin Integrity  abrasion(s);bruise(s) 10/17/17 0238    Passing flatus  yes 10/16/17 0234   SAFETY      COMMUNICATION ASSESSMENT     Safety WDL  WDL 10/17/17 0238    Patient's communication style  spoken language (English or Bilingual) 10/06/17 2336   Safety Equipment  oxygen flowmeter 10/15/17 0244    SKIN     All Alarms  alarm(s) activated and audible 10/15/17 1029                 Assessment WDL (Within Defined Limits) Definitions           Safety WDL     Effective: 09/28/15    Row Information: <b>WDL Definition:</b> Bed in low position, wheels locked; call light in reach; upper side rails up x 2; ID band on<br> <font color=\"gray\"><i>Item=AS safety wdl>>List=AS safety wdl>>Version=F14</i></font>      Skin WDL     Effective: 09/28/15    Row Information: <b>WDL Definition:</b> Warm; dry; intact; elastic; without discoloration; pressure points without redness<br> <font color=\"gray\"><i>Item=AS skin wdl>>List=AS skin wdl>>Version=F14</i></font>      Vitals     Vital Signs Flowsheet     COMMENTS     BSA (Calculated - sq m)  1.95 10/07/17 1333    Comments  Down to Pre Op 10/07/17 1419   BMI (Calculated)  26.66 10/07/17 1333    VITAL SIGNS     POSITIONING      Temp  97.5  F (36.4  C) 10/17/17 0812   Body Position  independently positioning 10/17/17 0238    Temp src  Axillary 10/17/17 0812   Head of Bed (HOB)  HOB at 30-45 degrees 10/17/17 0238    Resp  20 10/17/17 0812   Positioning/Transfer Devices  pillows 10/16/17 0242    Pulse  80 10/17/17 0812   DAILY CARE      Heart Rate  96 10/16/17 0204   Activity Management  activity encouraged 10/17/17 0238    Pulse/Heart Rate Source  Monitor 10/16/17 4923   Activity Assistance Provided  assistance, 1 person 10/16/17 0242    BP  115/64 10/17/17 0812   ECG      BP Location  Right arm 10/17/17 " 0812   ECG Rhythm  Sinus rhythm 10/08/17 1517    OXYGEN THERAPY     Ectopy  None 10/08/17 1517    SpO2  93 % 10/17/17 0812   Lead Monitored  Lead II;V 1 10/08/17 1517    O2 Device  None (Room air) 10/17/17 0812   Equipment  electrodes changed 10/07/17 2239    FiO2 (%)  2 % 10/12/17 1538   Rhythm Comment  ST Segment Normal 10/07/17 1354    Oxygen Delivery  1 LPM 10/16/17 0435   RESPIRATORY MONITORING      PAIN/COMFORT     Respiratory Monitoring (EtCO2)  0 mmHg 10/08/17 0914    Patient Currently in Pain  sleeping: patient not able to self report 10/16/17 2328   POINT OF CARE TESTING      Preferred Pain Scale  CAPA (Clinically Aligned Pain Assessment) ([facility] Adults Only) 10/16/17 0842   Puncture Site  fingertip 10/12/17 0534    Pain Location  Generalized 10/16/17 0842   Bedside Glucose (mg/dl )   120 mg/dl 10/12/17 0534    Pain Orientation  Right;Upper 10/11/17 1209   [REMOVED] RIGHT GROIN INTERVENTIONAL PROCEDURE ACCESS      Pain Descriptors  Headache 10/16/17 0443   Site Assessment  WDL 10/08/17 1517    Pain Management Interventions  analgesia administered 10/16/17 0443   Hemostasis management  Unchanged 10/08/17 1517    Pain Intervention(s)  Cold applied;Repositioned;Declines 10/16/17 0842   Femoral Bruit  not present 10/08/17 1517    Response to Interventions  Decrease in pain 10/16/17 0907   CMS Right Extremity  WDL 10/08/17 1517    CLINICALLY ALIGNED PAIN ASSESSMENT (CAPA) ([facility] ADULTS ONLY)     Dorsalis Pulse - Right Leg  Normal 10/08/17 1517    Comfort  tolerable with discomfort 10/16/17 0842   Popliteal Pulse - Right Leg  Weak 10/08/17 1517    Change in Pain  about the same 10/16/17 0842   Posterior Tibial Pulse - Right Leg  Present with doppler 10/08/17 1517    Pain Control  partially effective 10/16/17 0842   [REMOVED] LEFT BRACHIAL INTERVENTIONAL PROCEDURE ACCESS      Functioning  can do most things, but pain gets in the way of some 10/16/17 0842   Site  "Assessment  Hematoma 10/08/17 1517    Sleep  awake with occasional pain 10/16/17 0443   Hemostasis management  Other (Comment) (elevation ice pressure dressing) 10/08/17 1517    PAIN ASSESSMENT/NONVERBAL ICU (ADULT)     CMS Left Arm  WDL 10/08/17 1517    Nonverbal Indicators of Pain  Moaning 10/12/17 0947   Radial Pulse - Left Arm  Weak 10/08/17 1517    ANALGESIA SIDE EFFECTS MONITORING     [REMOVED] LEFT GROIN INTERVENTIONAL PROCEDURE ACCESS      Side Effects Monitoring: Respiratory Quality  R 10/16/17 0842   Site Assessment  WDL 10/08/17 1517    Side Effects Monitoring: Respiratory Depth  N 10/16/17 0842   Hemostasis management  Unchanged 10/08/17 1517    Side Effects Monitoring: Sedation Level  1 10/16/17 0842   Femoral Bruit  not present 10/08/17 1517    HEIGHT AND WEIGHT     CMS Left Extremity  WDL 10/08/17 1517    Height  1.727 m (5' 8\") 10/07/17 1333   Dorsalis Pulse - Left Leg  Normal 10/08/17 1517    Height Method  Stated 10/07/17 1333   Popliteal Pulse - Left Leg  Weak 10/08/17 1517    Weight  81.6 kg (180 lb) 10/16/17 1104   Posterior Tibial Pulse - Left Leg  Present with doppler 10/08/17 1517    Weight Method  Standing scale 10/16/17 1104                 Patient Lines/Drains/Airways Status    Active LINES/DRAINS/AIRWAYS     Name: Placement date: Placement time: Site: Days: Last dressing change:    Incision/Surgical Site 10/07/17 Left Arm 10/07/17   1823    9     Incision/Surgical Site 10/07/17 Bilateral;Anterior;Right Groin 10/07/17   1824    9     Incision/Surgical Site 10/07/17 Bilateral Groin 10/07/17   1851    9             Patient Lines/Drains/Airways Status    Active PICC/CVC     None            Intake/Output Detail Report     Date Intake         Output   Net    Shift P.O. I.V. IV Piggyback Colloid Blood Components Total Urine Blood Total       Day 10/16/17 0000 - 10/16/17 0659 120 -- -- -- -- 120 400 -- 400 -280    Debo 10/16/17 0700 - 10/16/17 1459 350 -- -- -- -- 350 1175 -- 1175 -825    Noc " 10/16/17 1500 - 10/16/17 2359 200 -- -- -- -- 200 200 -- 200 0    Day 10/17/17 0000 - 10/17/17 0659 120 -- -- -- -- 120 850 -- 850 -730    Debo 10/17/17 0700 - 10/17/17 1459 -- -- -- -- -- -- 225 -- 225 -225      Last Void/BM       Most Recent Value    Urine Occurrence 1 at 10/16/2017 1400    Stool Occurrence 1 at 10/16/2017 2021      Case Management/Discharge Planning     Case Management/Discharge Planning Flowsheet     LIVING ENVIRONMENT     MH/[facility] CAREGIVER      Lives With  facility resident 10/13/17 1138   Filed Complexity Screen Score  6 10/09/17 0918    Living Arrangements  group home 10/13/17 1138   ABUSE RISK SCREEN      COPING/STRESS     QUESTION TO PATIENT:  Has a member of your family or a partner(now or in the past) intimidated, hurt, manipulated, or controlled you in any way?  -- (Inpatient) 10/07/17 1310    Major Change/Loss/Stressor  housing concerns;hospitalization;limited support system;relocation 10/06/17 2341   QUESTION TO PATIENT: Do you feel safe going back to the place where you are living?  yes 10/06/17 2334    DISCHARGE PLANNING     OBSERVATION: Is there reason to believe there has been maltreatment of a vulnerable adult (ie. Physical/Sexual/Emotional abuse, self neglect, lack of adequate food, shelter, medical care, or financial exploitation)?  no 10/07/17 0100    Transportation Available  family or friend will provide 10/13/17 1138   (R) MENTAL HEALTH SUICIDE RISK      FINAL RESOURCES     Are you depressed or being treated for depression?  Yes 10/06/17 2330    Equipment Currently Used at Home  grab bar;shower chair;walker, rolling 10/13/17 1130

## 2017-10-06 NOTE — IP AVS SNAPSHOT
"    [address] [facility]: [phone]                                              INTERAGENCY TRANSFER FORM - PHYSICIAN ORDERS   10/6/2017                    Hospital Admission Date: 10/6/2017  CHINO JACKSON   : 1932  Sex: Female        Attending Provider: Laura Casillas MD     Allergies:  Penicillins, Shrimp    Infection:  None   Service:  VASCULAR OFELIA    Ht:  1.727 m (5' 8\")   Wt:  81.6 kg (180 lb)   Admission Wt:  79.4 kg (175 lb)    BMI:  27.37 kg/m 2   BSA:  1.98 m 2            Patient PCP Information     Provider PCP Type    Cecilio Montes PA-C General      ED Clinical Impression     Diagnosis Description Comment Added By Time Added    Abdominal aortic aneurysm (AAA) without rupture (H) [I71.4] Abdominal aortic aneurysm (AAA) without rupture (H) [I71.4]  Dhaval Carcamo MD 10/7/2017 10:27 AM      Hospital Problems as of 10/17/2017              Priority Class Noted POA    AAA (abdominal aortic aneurysm) (H) Medium  10/6/2017 Yes    Abdominal aortic aneurysm (H) Medium  10/7/2017 Yes      Non-Hospital Problems as of 10/17/2017     None      Code Status History     Date Active Date Inactive Code Status Order ID Comments User Context    10/17/2017  9:29 AM  Full Code [id number]  Jessica Moya APRN CNS Outpatient    10/7/2017  7:32 PM 10/17/2017  9:29 AM Full Code [id number]  Dhaval Carcamo MD Inpatient    10/6/2017 10:54 PM 10/7/2017  7:32 PM Full Code [id number]  Jomar Sifuentes MD Inpatient         Medication Review      START taking        Dose / Directions Comments    lidocaine 5 % Patch   Commonly known as:  LIDODERM        Dose:  2 patch   Place 2 patches onto the skin every 24 hours   Quantity:  30 patch   Refills:  0        melatonin 3 MG tablet        Dose:  3 mg   Take 1 tablet (3 mg) by mouth nightly as needed for sleep   Quantity:  30 tablet   Refills:  0        rosuvastatin 40 MG tablet   Commonly known as:  CRESTOR        Dose:  40 mg   Take 1 tablet (40 mg) by mouth daily "   Quantity:  30 tablet   Refills:  1        scopolamine 72 hr patch   Commonly known as:  TRANSDERM        Dose:  1 patch   Start taking on:  10/18/2017   Place 1 patch onto the skin every 72 hours   Quantity:  30 patch   Refills:  0          CONTINUE these medications which have NOT CHANGED        Dose / Directions Comments    ASPIRIN PO        Dose:  81 mg   Take 81 mg by mouth daily   Refills:  0        FISH OIL BURP-LESS PO   Indication:  Unsure of exact formulation.        Dose:  1 capsule   Take 1 capsule by mouth daily   Refills:  0        PROVENTIL  (90 BASE) MCG/ACT Inhaler   Generic drug:  albuterol        Dose:  2 puff   Inhale 2 puffs into the lungs every 6 hours as needed   Refills:  0        VITAMIN D (CHOLECALCIFEROL) PO        Dose:  2000 Units   Take 2,000 Units by mouth daily   Refills:  0        XANAX 0.5 MG tablet   Generic drug:  ALPRAZolam        Dose:  0.25 mg   Take 0.25 mg by mouth nightly as needed for anxiety (Anxiety)   Refills:  0                Summary of Visit     Reason for your hospital stay       You underwent endovascular abdominal aneurysm repair with Dr. Casillas             After Care     Activity - Up with nursing assistance           Additional Discharge Instructions       Okay to shower, no tub baths       Advance Diet as Tolerated       Follow this diet upon discharge: Orders Placed This Encounter      Snacks/Supplements Adult:         Snacks/Supplements Adult: Ensure Plus (Adult); Between Meals      Advance Diet as Tolerated: Regular Diet Adult       Daily weights       Call Provider for weight gain of more than 2 pounds per day or 5 pounds per week.       Fall precautions           General info for SNF       Length of Stay Estimate: Short Term Care: Estimated # of Days <30  Condition at Discharge: Stable  Level of care:skilled   Rehabilitation Potential: Good  Admission H&P remains valid and up-to-date: Yes  Recent Chemotherapy: N/A  Use Nursing Home Standing Orders:  Yes       Glucose monitor nursing POCT       Before meals and at bedtime       Intake and output       Every shift       Wound care       Site:  Left arm incision  Instructions: okay to keep left arm incision open to air             Referrals     Occupational Therapy Adult Consult       Evaluate and treat as clinically indicated.    Reason: stroke rehab       Physical Therapy Adult Consult       Evaluate and treat as clinically indicated.    Reason:  Stroke rehab             Radiology & Cardiology Orders     Future Labs/Procedures Complete By Expires    CT Abdomen Pelvis w/o Contrast  10/17/2017 (Approximate) 10/17/2018    Comments:    Administration of IV contrast (contrast agent, dose, and amount) will be tailored to this examination per the appropriate written protocol listed in the Protocol E-Book, or by the supervising imaging provider.       Radiology & Cardiology Orders     CT Abdomen Pelvis w/o Contrast       Administration of IV contrast (contrast agent, dose, and amount) will be tailored to this examination per the appropriate written protocol listed in the Protocol E-Book, or by the supervising imaging provider.              Your next 10 appointments already scheduled     Nov 30, 2017  3:20 PM CST   CT ABDOMEN PELVIS W/O CONTRAST with UCCT2   [facility] CT ([facility])    [address] [address] [phone]           Please bring any scans or X-rays taken at other hospitals, if similar tests were done. Also bring a list of your medicines, including vitamins, minerals and over-the-counter drugs. It is safest to leave personal items at home.  Be sure to tell your doctor:   If you have any allergies.   If there s any chance you are pregnant.   If you are breastfeeding.   If you have any special needs.  You may have contrast for this exam. To prepare:   Do not eat or drink for 2 hours before your exam. If you need to take medicine,  you may take it with small sips of water. (We may ask you to take liquid medicine as well.)   The day before your exam, drink extra fluids at least six 8-ounce glasses (unless your doctor tells you to restrict your fluids).  Patients over 70 or patients with diabetes or kidney problems:   If you haven t had a blood test (creatinine test) within the last 30 days, go to your clinic or Diagnostic Imaging Department for this test.  If you have diabetes:   If your kidney function is normal, continue taking your metformin (Avandamet, Glucophage, Glucovance, Metaglip) on the day of your exam.   If your kidney function is abnormal, wait 48 hours before restarting this medicine.  You will have oral contrast for this exam:   You will drink the contrast at home. Get this from your clinic or Diagnostic Imaging Department. Please follow the directions given.  Please wear loose clothing, such as a sweat suit or jogging clothes. Avoid snaps, zippers and other metal. We may ask you to undress and put on a hospital gown.  If you have any questions, please call the Imaging Department where you will have your exam.            Nov 30, 2017  3:45 PM CST   (Arrive by 3:30 PM)   New Vascular Visit with Laura Casillas MD   [facility] ([facility])    [address] [place] [id number] [phone]              Follow-Up Appointment Instructions     Future Labs/Procedures    Follow Up and recommended labs and tests     Comments:    Follow up with Dr. Casillas in one months with CT scan prior to appointment      Follow-Up Appointment Instructions     Follow Up and recommended labs and tests       Follow up with Dr. Casillas in one months with CT scan prior to appointment             Statement of Approval     Ordered          10/17/17 0902  I have reviewed and agree with all the recommendations and orders detailed in this document.  EFFECTIVE NOW     Approved and electronically signed  by:  Jessica Moya, JERROD CNS

## 2017-10-07 PROBLEM — I71.40 ABDOMINAL AORTIC ANEURYSM (H): Status: ACTIVE | Noted: 2017-01-01

## 2017-10-07 NOTE — H&P
SURGICAL ICU H&P  October 6, 2017      CO-MORBIDITIES:   No diagnosis found.    ASSESSMENT: Sandy Sheffield is a 84 year old female with PMH significant for DMII, HTN, CAD who presented from an OSH with expanding abdominal aortic aneurysm. She is currently hemodynamically stable and does not need emergent operative repair.     PLAN:  Neuro/ pain/ sedation:  - Monitor neurological status. Notify the MD for any acute changes in exam.  - PRN dilaudid for pain.    Pulmonary care:   - Supplemental oxygen to keep saturation above 92 %.  - Incentive spirometer every 15- 30 minutes when awake.    Cardiovascular:    - Monitor hemodynamic status.   - Keep normotensive     GI care:   - CLD    Fluids/ Electrolytes/ Nutrition:   - Replace electrolytes as needed  - No indication for parenteral nutrition.    Renal/ Fluid Balance:    - Urine output is adequate so far.  - Will continue to monitor intake and output.    Endocrine:    - Sliding scale for diabetes management.       ID/ Antibiotics:  - No indication for antibiotics.     Heme:     - Hemoglobin stable.   - Type and cross ordered     MSK:  - No issues. Extremities wwp     Prophylaxis:    - Mechanical prophylaxis for DVT.   - No chemical DVT prophylaxis due to high risk of bleeding.     Lines/ tubes/ drains:  - Arterial line  - PIV x2     Disposition:  - Surgical ICU.     Patient seen, findings and plan discussed with surgical ICU staff Ariadne.    Jomar Sifuentes MD  General Surgery, PGY-2  [phone]      - - - - - - - - - - - - - - - - - - - - - - - - - - - - - - - - - - - - - - - - - - - - - - - - - - - - - - - - - - - - - - - - - - - - - - - -     PRIMARY TEAM: Vascular Surgery  PRIMARY PHYSICIAN: Dr. Casillas    REASON FOR CRITICAL CARE ADMISSION: Close hemodynamic monitoring      ADMITTING PHYSICIAN: Dr. Ron     HISTORY PRESENTING ILLNESS: Sandy Sheffield is a 84 year old female with PMH significant for DMII, HTN, CAD who presented from an OSH with expanding abdominal  aortic aneurysm. Per patient, her kidney function has been worsening lately and it was noted on an US that she has an aneurysm. She was transferred to the [facility] for definitive management, Patient reports that she has been having abdominal discomfort for a while that has been on and off. She initially thought it was due to her hernia. She currently denies dizziness, lightheadedness or abdominal pain. No nausea or vomiting.     REVIEW OF SYSTEMS: As noted above.     PAST MEDICAL HISTORY:   DM  Lupus  HLD  Obesity  COPD  MI s/p coronary stent placement  CKD    SURGICAL HISTORY:   PCI  Tonsillectomy 1952  Cholecystectomy 2009  Hip replacement 2015  Cataract surgery 2009  Gastric surgery for ?bleeding gastric ulcer 2011    SOCIAL HISTORY: Tobacco - former smoker. Etoh - denies. Lives in a group home.     FAMILY HISTORY: Non contributory     ALLERGIES:      Allergies   Allergen Reactions     Penicillins      Shrimp Nausea and Vomiting       MEDICATIONS:  Albuterol   Alprazolam   ASA 81  Benadryl   Nitroglycrin   Zofran  Pantoprazole   Miralax   Sertraline     PHYSICAL EXAMINATION:     General: Alert, well-appearing in no acute distress.  HEENT: Normocephalic, atraumatic. Patent nares.   Neck: No cervical lymphadenopathy. No thyromegaly.   Respiratory: Non-labored breathing.  Cardiovascular: Regular rate and rhythm.   Gastrointestinal: Abdomen soft, non-distended, non tender to palpation. Ventral hernia noted. Reducible   Extremities: Moving all four extremities. WWP  Skin: As noted above. No rashes or lesions appreciated.    LABS: Reviewed.   Complete Blood Count     Recent Labs  Lab 10/06/17  2323   WBC 7.9   HGB 11.1*        Basic Metabolic Panel    Recent Labs  Lab 10/06/17  2323      POTASSIUM 3.4   CHLORIDE 103   CO2 24   BUN 29   CR 2.17*   *     Liver Function Tests    Recent Labs  Lab 10/06/17  2323   INR 1.14     Coagulation Profile    Recent Labs  Lab 10/06/17  2323   INR 1.14   PTT 38*        IMAGING:  OSH imaging:   - Abdominal aortic aneurysm extending from the level of the main renal artery to the aortic bifurication. There doesn't appear to be involvement of the common iliac arteries. Periaortic fat stranding, which may be related to contained rupture versus aortitis.

## 2017-10-07 NOTE — PROGRESS NOTES
In summary this patient has a greater than 9 cm abdominal aortic aneurysm at critical risk of rupture.  CT scan shows mild fat stranding supporting this concern.  She is not ruptured however and is not in significant pain.Aneurysm repair is absolutely indicated: This patient is alert, fairly independent and very much wants to be protected from rupture.  She will even accept hemodialysis if this is necessary.The major issue with repair is at her anatomy is very poor for an endovascular procedure: She has an angulated neck that is juxtarenal.  Furthermore she has renal dysfunction with a creatinine of 2.6, is 84 years old, has some degree of COPD, and has a history of heart disease with coronary stents in the last 15 years.  Overall she has extremely high risk for open aneurysm repair.    Fenestrated endograft repair while possible, would be very challenging as well due to the severe angulation in her anatomy.  Furthermore proper design of such graft would require a months to build.  In this context I think that a snorkel approach is the most likely to provide some protection from rupture without putting her through significant morbidity.  Tentative plan for endovascular aneurysm repair under regional anesthesia using CO2 to protect her kidneys.  We will performed today.  Risks discussed at length with the daughter who is a nurse as well as with the patient herself.

## 2017-10-07 NOTE — ANESTHESIA PREPROCEDURE EVALUATION
Anesthesia Evaluation     . Pt has had prior anesthetic. Type: General    No history of anesthetic complications          ROS/MED HX    ENT/Pulmonary:     (+)tobacco use, Past use COPD, , . .    Neurologic:  - neg neurologic ROS     Cardiovascular: Comment:     expanding abdominal aortic aneurysm        (+) hypertension--CAD, --stent (MI with stent placement),. Taking blood thinners (ASA 81) : . . . :. . Previous cardiac testing       METS/Exercise Tolerance:     Hematologic:     (+) Anemia, -      Musculoskeletal: Comment:   Lupus      (+) , , other musculoskeletal-       GI/Hepatic:     (+) Other GI/Hepatic Hx gastric ulcer with bleed in 2011      Renal/Genitourinary:     (+) chronic renal disease (GFR 20's with some worsening renal function recently), type: CRI and ARF,       Endo:     (+) type II DM .      Psychiatric:  - neg psychiatric ROS   (+) psychiatric history anxiety      Infectious Disease:  - neg infectious disease ROS       Malignancy:      - no malignancy   Other: Comment:     Tonsillectomy 1952  Cholecystectomy 2009  Hip replacement 2015  Cataract surgery 2009  Gastric surgery for ?bleeding gastric ulcer 2011       - neg other ROS                 Physical Exam  Normal systems: cardiovascular and pulmonary    Airway   Mallampati: II  TM distance: >3 FB  Neck ROM: full    Dental   (+) upper dentures and lower dentures    Cardiovascular       Pulmonary                     Anesthesia Plan      History & Physical Review  History and physical reviewed and following examination; no interval change.    ASA Status:  4 emergent.    NPO Status:  > 8 hours    Plan for MAC with Intravenous induction. Maintenance will be TIVA.  Reason for MAC:  Deep or markedly invasive procedure (G8)  PONV prophylaxis:  Ondansetron (or other 5HT-3) and Dexamethasone or Solumedrol  Additional equipment: 2nd IV and Arterial Line      Postoperative Care  Postoperative pain management:  IV analgesics.      Consents  Anesthetic plan,  risks, benefits and alternatives discussed with:  Patient..         ________________________________________________________________    Assessment: Sandy Sheffield is a 84 year old female with history of expanding abdominal aortic aneurysm who is scheduled for Procedure(s):  Endovascular Repair Abdominal Aortic Aneurysm - Wound Class: I-Clean with Dr. Casillas.    Plan: To be discussed with staff.     Cuco Cavazos, CA-2  [phone]    Procedure: Procedure(s):  Endovascular Repair Abdominal Aortic Aneurysm - Wound Class: I-Clean    PMHx/PSHx:  No past medical history on file.  No past surgical history on file.  Social History   Substance Use Topics     Smoking status: Not on file     Smokeless tobacco: Not on file     Alcohol use Not on file     Allergies   Allergen Reactions     Penicillins      Shrimp Nausea and Vomiting     No prescriptions prior to admission.     No current outpatient prescriptions on file.     Objective:     Lab Results   Component Value Date    WBC 7.9 10/06/2017    HGB 11.1 (L) 10/06/2017    HCT 34.9 (L) 10/06/2017     10/06/2017     10/06/2017    POTASSIUM 3.4 10/06/2017    CHLORIDE 103 10/06/2017    CO2 24 10/06/2017    BUN 29 10/06/2017    CR 2.17 (H) 10/06/2017     (H) 10/06/2017    INR 1.14 10/06/2017

## 2017-10-07 NOTE — PROGRESS NOTES
"SPIRITUAL HEALTH SERVICES   [facility] ([facility] 4A   ON-CALL VISIT   REFERRAL SOURCE: request on admission   Sandy rosecomed a  visit and spoke freely of    Her anticipation of complex surgery to place stents to address an abdominal aneurysm.  While she said \"I don't understand all of it,\" she expressed her confidence in the expertise of the team and her hope that she would not be dependent on dialysis afterward.  \"I don't hurt now but I will after.\"    Her concerns about where she will live next.  She had been living with a son, Syed, who now lives in [place] \"but he lost the place on .\"  Since that time she lived for two weeks with a granddaughter but she then put her into a facility which \"costs $1000 a month.\" She expressed her hope that she and another son would be able to find a place together.    Having had 11 children - one of them adopted.  Of those living - two have  - they now live all over the country ([place] and [place]) and the state.  She described herself as a [employer].  \"I haven't attended a particular Religion in a long time but I rely on God.\" She named Father Kraig as a  at the [facility] she came from with whom she has a special bond.  She described many others who are praying as well.  Per her request, we prayed.  She requested that she be kept in prayer as well.  PLAN: Will notify the unit  of our visit.     BETTINA Shelton.  Staff   Pager [phone]    "

## 2017-10-07 NOTE — ANESTHESIA PROCEDURE NOTES
Arterial Line Procedure Note  Staff:     Anesthesiologist:  DAISHA VILLANUEVA    Resident/CRNA:  IVON WOODS    Arterial line performed by resident/CRNA in presence of a teaching physician    Location: ICU  Procedure Start/Stop Times:      10/7/2017 12:50 PM     10/7/2017 1:00 PM    patient identified, IV checked, site marked, risks and benefits discussed, informed consent, monitors and equipment checked, pre-op evaluation and at physician/surgeon's request      Correct Patient: Yes      Correct Position: Yes      Correct Site: Yes      Correct Procedure: Yes      Correct Laterality:  Yes    Site Marked:  Yes  Line Placement:     Procedure:  Arterial Line    Insertion Site:  Radial    Insertion laterality:  Right    Skin Prep: Chloraprep      Patient Prep: patient draped, mask and sterile gloves      Local skin infiltration:  1% lidocaine    amount (mL):  1    Ultrasound Guided?: Yes      Artery evaluated via ultrasound confirming patency.   Using realtime imaging, the artery was punctured and the needle was observed entering the artery.      A permanent image is NOT entered into the patient's record.      Catheter size:  20 gauge, 12 cm    Cath secured with: suture      Dressing:  Tegaderm    Complications:  None obvious    Arterial waveform: Yes      IBP within 10% of NIBP: Yes

## 2017-10-07 NOTE — H&P
VASCULAR SURGERY H&P  October 7, 2017      ASSESSMENT: Sandy Sheffield is a 84 year old female with PMH significant for DMII, HTN, CAD who presented from an OSH with expanding abdominal aortic aneurysm. She is currently hemodynamically stable and does not need emergent operative repair.     PLAN:    - Admit to SICU for close hemodynamic monitoring  - Keep normotensive   - Ok for CLD  - Plan for endovascular operative intervention over the week    Patient seen, findings and plan discussed with staff, Dr. Casillas.        HISTORY PRESENTING ILLNESS: Sandy Sheffield is a 84 year old female with PMH significant for DMII, HTN, CAD who presented from an OSH with expanding abdominal aortic aneurysm. Per patient, her kidney function has been worsening lately and it was noted on an US that she has an aneurysm. She was transferred to the [facility] for definitive management, Patient reports that she has been having abdominal discomfort for a while that has been on and off. She initially thought it was due to her hernia. She currently denies dizziness, lightheadedness or abdominal pain. No nausea or vomiting.     REVIEW OF SYSTEMS: As noted above.     PAST MEDICAL HISTORY:   DM  Lupus  HLD  Obesity  COPD  MI s/p coronary stent placement  CKD     SURGICAL HISTORY:   PCI  Tonsillectomy 1952  Cholecystectomy 2009  Hip replacement 2015  Cataract surgery 2009  Gastric surgery for ?bleeding gastric ulcer 2011     SOCIAL HISTORY: Tobacco - former smoker. Etoh - denies. Lives in a group home.      FAMILY HISTORY: Non contributory      ALLERGIES:            Allergies   Allergen Reactions     Penicillins       Shrimp Nausea and Vomiting         MEDICATIONS:  Albuterol   Alprazolam   ASA 81  Benadryl   Nitroglycrin   Zofran  Pantoprazole   Miralax   Sertraline      PHYSICAL EXAMINATION:     General: Alert, well-appearing in no acute distress.  HEENT: Normocephalic, atraumatic. Patent nares.   Neck: No cervical lymphadenopathy. No thyromegaly.    Respiratory: Non-labored breathing.  Cardiovascular: Regular rate and rhythm.   Gastrointestinal: Abdomen soft, non-distended, non tender to palpation. Ventral hernia noted. Reducible   Extremities: Moving all four extremities. WWP  Skin: As noted above. No rashes or lesions appreciated.    LABS: Reviewed.   Arterial Blood Gases   No lab results found in last 7 days.  Complete Blood Count     Recent Labs  Lab 10/06/17  2323   WBC 7.9   HGB 11.1*        Basic Metabolic Panel    Recent Labs  Lab 10/06/17  2323      POTASSIUM 3.4   CHLORIDE 103   CO2 24   BUN 29   CR 2.17*   *     Liver Function Tests    Recent Labs  Lab 10/06/17  2323   INR 1.14     Pancreatic Enzymes  No lab results found in last 7 days.  Coagulation Profile    Recent Labs  Lab 10/06/17  2323   INR 1.14   PTT 38*         IMAGING:  No results found for this or any previous visit.      CO-MORBIDITIES:   No diagnosis found.      Jomar Sifuentes MD  General Surgery, PGY-2  [phone]

## 2017-10-07 NOTE — PROGRESS NOTES
SURGICAL ICU PROGRESS NOTE  October 7, 2017      CO-MORBIDITIES:   CAD  COPD  HTN  DMII  CKD    ASSESSMENT: Sandy Sheffield is a 84 year old female with PMH significant for DMII, HTN, CAD who presented from an OSH with expanding abdominal aortic aneurysm. She is currently hemodynamically stable. Vascular surgery to repair endovascular 10/7.       Major Changes:   - plan for OR today with vascular surgery for aortic stent graft placement     PLAN:  Neuro/ pain/ sedation:  - Monitor neurological status. Notify the MD for any acute changes in exam.  - PRN dilaudid for pain.     Pulmonary care:   - Supplemental oxygen to keep saturation above 92 %.  - Incentive spirometer every 15- 30 minutes when awake.     Cardiovascular:    - Monitor hemodynamic status.   - Keep normotensive.  - Discuss with Vascular      GI care:   - NPO for surgery.      Fluids/ Electrolytes/ Nutrition:   - Replace electrolytes as needed  - No indication for parenteral nutrition.    Renal/ Fluid Balance:    - Urine output is adequate so far.  - Will continue to monitor intake and output.  - [place] likely to be placed intraop and will leave in place upon return pending status.      Endocrine:    - Sliding scale for diabetes management.        ID/ Antibiotics:  - No indication for antibiotics.      Heme:     - Hemoglobin stable.   - T&S available.       MSK:  - No issues. Extremities wwp      Prophylaxis:    - Mechanical prophylaxis for DVT.   - No chemical DVT prophylaxis due to high risk of bleeding.      Lines/ tubes/ drains:  - Arterial line  - PIV x2      Disposition:  - Surgical ICU s/p OR.      Patient seen, findings and plan discussed with surgical ICU staff Dr. Vazquez.    Erick Pena MD  CA-2/PGY-3    ====================================    TODAY'S PROGRESS:   SUBJECTIVE:   - No cute events overnight. Patient denies any pain or discomfort.       OBJECTIVE:   1. VITAL SIGNS:   Temp:  [97.6  F (36.4  C)-98.4  F (36.9  C)] 97.6  F  (36.4  C)  Heart Rate:  [66-92] 85  Resp:  [15-68] 18  BP: ()/(41-94) 113/85  SpO2:  [92 %-97 %] 97 %  Resp: 18    2. INTAKE/ OUTPUT:   I/O last 3 completed shifts:  In: -   Out: 250 [Urine:250]    3. PHYSICAL EXAMINATION:   General: lying in bed comfortably.   Neuro: A&Ox3, NAD  Resp: Breathing non-labored  CV: RRR  Abdomen: Soft, Non-distended, Non-tender  Incisions: N/A  Extremities: warm and well perfused.     4. INVESTIGATIONS:   Arterial Blood Gases   No lab results found in last 7 days.  Complete Blood Count     Recent Labs  Lab 10/06/17  2323   WBC 7.9   HGB 11.1*        Basic Metabolic Panel    Recent Labs  Lab 10/06/17  2323      POTASSIUM 3.4   CHLORIDE 103   CO2 24   BUN 29   CR 2.17*   *     Liver Function Tests    Recent Labs  Lab 10/06/17  2323   INR 1.14     Pancreatic Enzymes  No lab results found in last 7 days.  Coagulation Profile    Recent Labs  Lab 10/06/17  2323   INR 1.14   PTT 38*     Lactate  Invalid input(s): LACTATE    5. RADIOLOGY:   No results found for this or any previous visit (from the past 24 hour(s)).    =========================================

## 2017-10-07 NOTE — ANESTHESIA PROCEDURE NOTES
Arterial Line Procedure Note  Staff:     Anesthesiologist:  DAISHA VILLANUEVA    Resident/CRNA:  IVON WOODS    Arterial line performed by resident/CRNA in presence of a teaching physician    Location: In OR After Induction  Procedure Start/Stop Times:     patient identified, IV checked, site marked, risks and benefits discussed, informed consent, monitors and equipment checked, pre-op evaluation and at physician/surgeon's request      Correct Patient: Yes      Correct Position: Yes      Correct Site: Yes      Correct Procedure: Yes      Correct Laterality:  Yes    Site Marked:  Yes  Line Placement:     Procedure:  Arterial Line    Insertion Site:  Brachial    Insertion laterality:  Right    Skin Prep: Chloraprep      Patient Prep: patient draped, mask and sterile gloves      Local skin infiltration:  None    Ultrasound Guided?: Yes      Artery evaluated via ultrasound confirming patency.   Using realtime imaging, the artery was punctured and the needle was observed entering the artery.      A permanent image is NOT entered into the patient's record.      Catheter size:  20 gauge, 12 cm    Cath secured with: suture      Dressing:  Tegaderm    Complications:  Hematoma    Arterial waveform: Yes      IBP within 10% of NIBP: Yes

## 2017-10-07 NOTE — PLAN OF CARE
Problem: Patient Care Overview  Goal: Plan of Care/Patient Progress Review  Outcome: No Change  D/I: patient admitted to the Surgical/Neuro ICU from OSH with AAA.    Neuro- AAOx4, moves all extremities with equal strength. PERRL.   CV- sinus rhythm on monitor HR 60s-70s. BP 110s/70s - 120s/90s. Weak but palpable pulses.  Pulm- lungs clear and diminished throughout. On room air, sats low to mid 90s  GI- denies nausea. Initially NPO but after talking with Dr. Casillas, patient cleared to have clear liquid diet -tolerating well but has minimal appetite. Patient reports having unintentionally lost 25+ pounds recently d/t lack of appetite.  - voids spontaneously, adequate urine output.  Skin- dry, fragile, intact. Toes are dark red and cool states that she has normal sensation.  Activity- uses walker at home. Strict Bedrest.  Social- reports she considers herself homeless, she lives in a group home and doesn't like it and does not want to return. -social work consulted.  See flow sheets for further details and assessments.  A: stable  P: continue to monitor, notify MD of significant changes. Anticipate possible OR.

## 2017-10-07 NOTE — PROGRESS NOTES
Vascular Surgery Progress Note     Patient seen and evaluated at the bedside. No complaints overnight. Blood pressure has been well managed.     /80  Temp 97.9  F (36.6  C) (Oral)  Resp 22  SpO2 97%    Intake/Output Summary (Last 24 hours) at 10/07/17 1032  Last data filed at 10/07/17 1000   Gross per 24 hour   Intake                0 ml   Output              600 ml   Net             -600 ml     General: Elderly  female resting supine NAD   Cor: RRR  Pulm: Unlabored breathing.   Abd: S/NT/ND.   LE: Warm and well perfused bilaterally. Palpable dorsalis pedis pulses is appreciated     Assessment: Mrs. Sheffield is an 85 yo female with a history of DMII, HTN, COPD, CKD stage II and CAD with PCI? Transferred from outside institution for a large infrarenal Abdominal Aortic Aneurysm.  Patient is at high risk for rupture.     Plan:     1.  Will plan for endovascular repair today at approximately 1:30pm. Patient is consented and marked.  2.  Shellfish allergy. Premedicate per protocol. Will use contrast sparingly  3.  Two units on call to OR. Type and screen drawn yesterday.     Dhaval Carcamo MD   Vascular Surgery Fellow  Pager: [phone]

## 2017-10-07 NOTE — PLAN OF CARE
Problem: Patient Care Overview  Goal: Plan of Care/Patient Progress Review  Outcome: No Change  Pt left unit for OR @ 1310. Pt currently in OR for Triple A repair. Prior to transfer pt was alert and orientated x4. Up to commode with SBA. Sinus rhythm. HR 60's-80's. 's-120's. Afebrile. RA, lung sounds clear. NPO for surgery. Active bowel sounds. Spontaneously voiding adequate amounts. Left and right PIV saline locked. Right radial abigail place just before going to OR per anesthesia. Several family members and friends here to see pt and went to OR waiting room.

## 2017-10-08 NOTE — OP NOTE
DATE OF SURGERY:  10/07/2017.      LOCATION:  [facility], [place], [facility] operating room.      PREOPERATIVE DIAGNOSIS:  Abdominal aortic aneurysm.      POSTOPERATIVE DIAGNOSIS:  Abdominal aortic aneurysm with comorbidities of severe COPD, cardiac disease and chronic renal failure.      SURGEON:  Laura Casillas MD.      FIRST ASSISTANT:  Dhaval Carcamo MD.      PROCEDURES:  Ultrasound-guided vascular access x2, bilateral lower extremity arterial duplex limited study, aortic catheterization x1, left brachial artery exposure and primary repair, left renal stent placement with selective left renal angiography, endovascular repair of abdominal aortic aneurysm with 3 component endograft and limb extension x1.      FINDINGS:  On arterial duplex at the start of the case, triphasic waveforms in both common femoral arteries and superficial femoral arteries bilaterally with healthy-appearing common femoral artery at greater than 7 mm in maximum diameter and with only a trivial amount of plaque all largely on the posterior wall.  Access gained at the 12 o'clock position on each common femoral artery using ultrasound guidance.  Images of this were sent to the PACS system.  Left brachial artery was exposed at the antecubital fossa under local anesthesia and was found to be a small but healthy-appearing artery without vessel wall disease.  Our initial CO2 angiography identified a large abdominal aortic aneurysm confirmed with a noncontrast CT scan with angulated and short neck and visible left renal artery, extremely poorly visualized right renal artery and visible SMA and celiac arteries.  Despite multiple angles, we could not clearly visualize the right renal artery or cannulate it, and therefore, a selective dye angiogram was performed with 10 mL of dye which delineated an extremely stenosed right main renal artery with significant angulation making it difficult to either cannulate or stent.  The left  renal artery was widely patent with a large mildly aneurysmal orifice.  Aneurysm neck was extremely short at certainly less than 10 mm in length and significantly angulated.  Wire and catheter advanced from the left arm were used to select the left renal artery and selective angiography was performed showing second order branches and good filling of the renal parenchyma in this patient with known chronic renal dysfunction.  Endograft placed was an Endurant [id number] main body through the left side with a contralateral limb of an [id number] to the iliac bifurcation and on the ipsilateral side required an extension of an [id number] limb.  The left renal artery was stented with an atrium 6 mm x 38 mm balloon expandable stent which was positioned at the top end of the endograft with no real segment of snorkeling.  Completion angiogram after complete deployment of the graft was done with CO2 showing good positioning of the endograft patency of both renal arteries, the SMA and the celiac artery and absolutely no filling of the aneurysm sac.  Completion duplex after percutaneous closure revealed triphasic waveforms in both common femoral arteries and superficial femoral arteries bilaterally, good positioning of the sutures in the anterior wall of the artery, no full luminal compromise, thrombus or dissection in the vessel and no active extravasation.  Completion duplex of the ulnar and radial arteries showed good biphasic waveforms in his vessels as seen preprocedure.      BRIEF CLINICAL HISTORY:  Sandy Sheffield is an 84-year-old woman with known COPD and cardiac disease, but good mental faculties who was discovered to have a 9 cm abdominal aortic aneurysm during presentation of pain of unclear etiology, transferred here for definitive repair.  A noncontrast CT showed this and showed that she did not have an adequate neck for standard endovascular repair.  After careful consideration, we felt that she warranted an  attempt at repair rather than palliation, but that open surgery would be nonsurvivable for this patient.  In this context, we considered for an endovascular repair using attempts to preserve the renal arteries but recognizing that she may need to go on to dialysis if this failed, which she was comfortable with.        DESCRIPTION OF PROCEDURE:  The patient was prepped and draped for access to her left arm as well as her abdomen and both groins and legs down to the level of the knees.  Ultrasound was performed with the findings noted above.  Local anesthesia was infiltrated and needle access was gained to the common femoral artery on each side using ultrasound guidance.  Seldinger technique was used to place 7-[place] sheaths and these were exchanged out for 2 ProGlide devices that were deployed in a preclose fashion at 90 degrees to each other in each groin.  We then exchanged in an 11-[place] sheath.  Simultaneously, the left brachial artery was infiltrated with Marcaine 0.25 and after percutaneous access had been performed a transverse cut down was performed just distal to the antecubital fossa crease and extended up to subcutaneous tissue and fascia dividing the biceps aponeurosis to identify the brachial artery and vein.  The artery was circumferentially controlled.  A 6-0 Prolene stay suture was placed in the artery and micropuncture technique was used to gain access to the vessel.  We now advanced the wire and catheter under fluoroscopy up the brachial artery, the axillary artery, around the subclavian artery and down the descending thoracic aorta.  We exchanged in a 7-[place] 90 cm sheath with the aid of a stiff wire.  From the groin we advanced a SOS catheter and spent significant time attempting to cannulate the right renal artery without success.  Multiple CO2 angiograms were also performed from this level which did not clearly delineate the artery.  Finally a pigtail angiogram was performed with 10 mL of  contrast to delineate the very atretic right renal artery orifice.  It also allowed us to cannulate the left renal artery which was performed from the left brachial approach, advancing a wire and our sheath into this vessel and performed selective angiography, further confirming this.  We now exchanged in a Lunderquist wire on the left groin and exchanged in our endograft main body which we positioned immediately below the right renal artery orifice and what we felt would be covering the left renal artery.  As a result we placed an atrium stent in the left renal artery orifice and extended it up to function as a snorkel to maintain flow to that renal artery.  Once we deployed the endograft into place; however, we had protected the right renal artery, but due to the way the endograft fell into position it had only partially covered the right renal artery and the snorkeled part of the graft largely did not involve the seal zone and extended up outside the bare stent component of the endograft.  We could clearly see a pinch point in the distal end of the neck of the aneurysm suggesting that we would have an adequate seal with this.  Given that we did not know how much of this contained thrombus, we elected not to balloon it in this high risk patient.  We completed the endograft deployment, measured and exchanged an ipsilateral and contralateral limb as noted above using the usual techniques and then ballooned the limbs with a Reliant balloon.  Completion CO2 angiogram was performed showing no filling of the aneurysm sac whatsoever as well as good filling of the renal arteries and SMA.  We now removed our sheath and deployed our ProGlide devices to close the access sites.  A completion duplex was performed with the findings noted above.  The sheath and the brachial artery was removed and the artery was repaired with 6-0 Prolene suture.  Duplex was performed confirming flow distal to our repair.  There was good  pulsatility in this vessel.  We irrigated that wound and closed it in 2 layers with 3-0 Vicryl for subcutaneous tissue and fascia and 4-0 subcuticular for skin.  Dermabond was applied over top of all access sites.  The groin puncture sites were closed with 4-0 Vicryl and Dermabond.  The procedure was well tolerated.         GALLITO ARSHAD MD             D: 10/07/2017 19:09   T: 10/07/2017 21:06   MT:       Name:     CHINO JACKSON   MRN:      [id number]        Account:        [id number]   :      1932           Procedure Date: 10/07/2017      Document: [id number]       cc: DAWSON RILEY MD

## 2017-10-08 NOTE — ANESTHESIA POSTPROCEDURE EVALUATION
Patient: Sandy Sheffield    Procedure(s):  -Endovascular Repair Abdominal Aortic Aneurysm  with Endorant with Limb Extention x1.  - Aortic Dissection x1   -Revision of left Brachial  Artery Repair   -Arterial limited duplex study - Wound Class: I-Clean    Diagnosis:Abdominal Aortic Aneurysm  Diagnosis Additional Information: No value filed.    Anesthesia Type:  MAC    Note:  Anesthesia Post Evaluation    Patient location during evaluation: ICU  Patient participation: Able to fully participate in evaluation  Level of consciousness: awake  Pain management: adequate  Airway patency: patent  Cardiovascular status: acceptable  Respiratory status: acceptable and spontaneous ventilation  Hydration status: acceptable  PONV: none     Anesthetic complications: None          Last vitals:  Vitals:    10/07/17 1350 10/07/17 1355 10/07/17 1400   BP: 111/87     Resp:      Temp:      SpO2: 96% 92% 91%         Electronically Signed By: Blaire Farah MD  October 7, 2017  7:38 PM

## 2017-10-08 NOTE — BRIEF OP NOTE
[facility] Brief Operative Note    Pre-operative diagnosis: Abdominal Aortic Aneurysm   Post-operative diagnosis Same   Procedure: Procedure(s):  -Endovascular Repair Abdominal Aortic Aneurysm  with Endorant with Limb Extention x1.  - Aortic Dissection x1   -Revision of left Brachial  Artery Repair   -Arterial limited duplex study - Wound Class: I-Clean   Surgeon(s): Surgeon(s) and Role:     * Laura Casillas MD - Primary     * Dhaval Carcamo MD - Resident - Assisting   Estimated blood loss: 300 mL    Specimens: * No specimens in log *   Findings: Deployment of bifurcated graft 36 X 16X 166 ipsilateral right with a contralateral 16 x 20 x 156 on the right and extended with a 16 x 16 x 93 on the left. Left renal artery snorkeled with a 6 x 38 atrium stent delivered through left brachial artery. Postop angiogram significant for no endoleak. Patient tolerated procedure well.  Palpable dorsalis pedis arteries are palpable bilaterally.        Total Contrast:                            215 ml    Fluoro Time:                                81min     Radiation Dose:                         2615 mGy      Dhaval Carcamo MD   Vascular Surgery Fellow  Pager: [phone]

## 2017-10-08 NOTE — PLAN OF CARE
Problem: Patient Care Overview  Goal: Plan of Care/Patient Progress Review  D/I: patient remains in the Surgical/Neuro ICU with AAA repaired 10/7/17  Neuro- alert, oriented to self only, with some assistance/hints, able to orient x4. moves all extremities with equal strength. PERRL.   CV- sinus rhythm on monitor HR 80s-90s. BP 120s/70s. Weak but palpable pulses.  Pulm- lungs clear and diminished throughout. On room air, sats low to mid 90s  GI- c/o nausea not relieved by Zofran, but some relief with 5mg compazine (given x2) sea bands and aromatherapy also used. Clear liquid diet -tolerating only sips of water.  - macario in place, good urine output.  Skin- dry, fragile, intact. Bruised  Social- reports she considers herself homeless, she lives in a group home and doesn't like it and does not want to return. -social work consulted.  See flow sheets for further details and assessments.  A: stable  P: continue to monitor, notify MD of significant changes.

## 2017-10-08 NOTE — PROGRESS NOTES
SURGICAL ICU PROGRESS NOTE  October 8, 2017      CO-MORBIDITIES:   CAD  COPD  HTN  DMII  CKD    ASSESSMENT: Sandy Sheffield is a 84 year old female with PMH significant for DMII, HTN, CAD who presented from an OSH with expanding abdominal aortic aneurysm. She is currently hemodynamically stable. Vascular surgery to repair endovascular 10/7. Has done well post op other than some confusion which seems to be improving. Likely related to ICU delirium        Major Changes:   - more confused today  - Transfer to the floor.     PLAN:  Neuro/ pain/ sedation:  #AMS 2/2 ICU delirium, improved and resolving.   - Monitor neurological status.   - Promote day night cycle.   - Notify the MD for any acute changes in exam.  - PRN dilaudid for pain, minimize use.      Pulmonary care:   - Supplemental oxygen to keep saturation above 92 %.  - Incentive spirometer every 15- 30 minutes when awake.     Cardiovascular:    - Monitor hemodynamic status.   - Keep normotensive. PRN's available.       GI care:   - No concerns.      Fluids/ Electrolytes/ Nutrition:   - Replace electrolytes as needed  - No indication for parenteral nutrition.    Renal/ Fluid Balance:    - Urine output is adequate so far.  - Will continue to monitor intake and output.     Endocrine:    - Sliding scale for diabetes management.        ID/ Antibiotics:  - No indication for antibiotics.      Heme:     - Hemoglobin stable.   - T&S available.       MSK:  - No issues. Extremities wwp      Prophylaxis:    - Mechanical prophylaxis for DVT.   - No chemical DVT prophylaxis due to high risk of bleeding.      Lines/ tubes/ drains:  - Arterial line  - PIV x2      Disposition:  - Surgical ICU       Patient seen, findings and plan discussed with surgical ICU staff Dr. Vazquez.    Erick Pena MD  CA-2/PGY-3    ====================================    TODAY'S PROGRESS:   SUBJECTIVE:   - No cute events overnight. OR yesterday tolerated well. 300 EBL and given 1 pRBC. Some  confusion today but seems to be improving.       OBJECTIVE:   1. VITAL SIGNS:   Temp:  [97.5  F (36.4  C)-99.1  F (37.3  C)] 99  F (37.2  C)  Heart Rate:  [] 97  Resp:  [9-39] 20  BP: ()/(50-68) 99/50  MAP:  [69 mmHg-106 mmHg] 80 mmHg  Arterial Line BP: ()/(54-76) 116/58  SpO2:  [90 %-97 %] 97 %  Resp: 20    2. INTAKE/ OUTPUT:   I/O last 3 completed shifts:  In: 3113.08 [I.V.:2313.08]  Out: 1545 [Urine:1245; Blood:300]    3. PHYSICAL EXAMINATION:   General: lying in bed comfortably.   Neuro: A&Ox3, NAD  Resp: Breathing non-labored  CV: RRR  Abdomen: Soft, Non-distended, Non-tender  Incisions: N/A  Extremities: warm and well perfused.     4. INVESTIGATIONS:   Arterial Blood Gases     Recent Labs  Lab 10/08/17  0827 10/07/17  1705 10/07/17  1559 10/07/17  1515   PH 7.37 7.16* 7.16* 7.24*   PCO2 32* 60* 61* 47*   PO2 66* 85 122* 89   HCO3 19* 21 22 20*     Complete Blood Count     Recent Labs  Lab 10/08/17  0251 10/07/17  1705 10/07/17  1559 10/07/17  1515 10/06/17  2323   WBC 10.8  --   --   --  7.9   HGB 9.9* 10.7* 10.3* 9.1* 11.1*     151  --   --   --  197     Basic Metabolic Panel    Recent Labs  Lab 10/08/17  0251 10/07/17  1705 10/07/17  1559 10/07/17  1515 10/06/17  2323    138 138 137 134   POTASSIUM 3.6 3.6 3.5 3.1* 3.4   CHLORIDE 108  --   --   --  103   CO2 19*  --   --   --  24   BUN 27  --   --   --  29   CR 2.27*  --   --   --  2.17*   * 119* 114* 108* 108*     Liver Function Tests    Recent Labs  Lab 10/06/17  2323   INR 1.14     Pancreatic Enzymes  No lab results found in last 7 days.  Coagulation Profile    Recent Labs  Lab 10/06/17  2323   INR 1.14   PTT 38*     Lactate  Invalid input(s): LACTATE    5. RADIOLOGY:   Recent Results (from the past 24 hour(s))   IR OR Angiogram    Narrative    This exam was marked as non-reportable because it will not be read by a   radiologist or a [place] non-radiologist provider.                  =========================================

## 2017-10-08 NOTE — PROGRESS NOTES
"Post op day 1 EVAR with left renal stent under local/sedation for 9cm AAA in high risk patient.  Was confused and nauseated overnight but otherwise stable vitals and good urine output.  This am much better and clearly oriented.    /68 (BP Location: Right leg)  Temp 98.8  F (37.1  C) (Oral)  Resp 26  Ht 5' 8\"  Wt 173 lb 1 oz  SpO2 93%  BMI 26.31 kg/m2     left arm incision and groin punctures look good.    Lab Results   Component Value Date    WBC 10.8 10/08/2017    HGB 9.9 (L) 10/08/2017    HCT 31.6 (L) 10/08/2017     10/08/2017     10/08/2017     10/08/2017    POTASSIUM 3.6 10/08/2017    CHLORIDE 108 10/08/2017    CO2 19 (L) 10/08/2017    BUN 27 10/08/2017    CR 2.27 (H) 10/08/2017     (H) 10/08/2017    TROPI <0.015 10/08/2017    INR 1.14 10/06/2017     IMP/PLAN:    Doing well.  kidneys tolerated procedure well: no bump in creatinine. Nausea/confusino not an angina/ACS equivalent: no change in EKG and trop ok. [place] and art-line out this am.  OK for garcia.  Will get OT/PT to assess for transitional care prior to discharge.  "

## 2017-10-08 NOTE — PLAN OF CARE
Problem: Patient Care Overview  Goal: Plan of Care/Patient Progress Review  Outcome: No Change  Vitals:     10/08/17 1100 10/08/17 1200 10/08/17 1230 10/08/17 1359   BP: 101/58 98/58 111/67 99/50   BP Location:           Resp: 23 19 26 20   Temp:       99  F (37.2  C)   TempSrc:       Axillary   SpO2: 94% 94%   97%   Weight:           Height:           AVSS. Transferred from  at 1350. Pt is alert to self only. Has being confused since after surgery per report and family. Bed alarm on for safety. Bilateral groin puncture sites intact with dressing on. A-line site on her LUE ac site is covered with dressing and intact. Bilateral LE CMS intact. Beka was dc/ed by  nurse at 1030. No void yet. DTV by [id number]. Pt currently resting in bed. Continue with current cares.

## 2017-10-08 NOTE — ANESTHESIA CARE TRANSFER NOTE
Patient: Sandy Sheffield    Procedure(s):  -Endovascular Repair Abdominal Aortic Aneurysm  with Endorant with Limb Extention x1.  - Aortic Dissection x1   -Revision of left Brachial  Artery Repair   -Arterial limited duplex study - Wound Class: I-Clean    Diagnosis: Abdominal Aortic Aneurysm  Diagnosis Additional Information: No value filed.    Anesthesia Type:   MAC     Note:    Patient transferred to:ICU  Comments: VS stable, awake, spontaneous breathing. Transported to ICU. Report RN      Vitals: (Last set prior to Anesthesia Care Transfer)    CRNA VITALS  10/7/2017 1838 - 10/7/2017 1922      10/7/2017             Pulse: 87    ART BP: 110/62    ART Mean: 78    SpO2: 97 %    Resp Rate (set): 10                Electronically Signed By: Delilah Shelton  October 7, 2017  7:22 PM

## 2017-10-08 NOTE — PLAN OF CARE
Problem: Patient Care Overview  Goal: Plan of Care/Patient Progress Review  Outcome: No Change  Pt transferred to  @ ~1330. All personal belongings sent with pt. Pt lethargic, easily aroused. Follows commands. Pupils equal and reactive. Orientated to place, situation and self.Sinus rhythm. HR 90's. Pt did have single episode of tachycardia, HR in 120's, when up to commode. MD notified, EKG ordered. No changes on EKG. After pt was back in bed tachycardia slowly resolved. 's-120's. Afebrile. Lung sounds clear, RA. One episode of nausea @~ 0930. Zofran given x1, per pt nausea resolved. No further complaints of nausea. Clear liquid diet advance as tolerated. Poor appetite. [place] removed @ 1030. Several bruised noted on bilateral arms from lab draws and IV attempts. Left brachial hematoma unchanged. Elevated with Ice and pressure dressing. Right and left groin sites unchanged. Right and left PIV.  ml/hr. Family updated and here at time of transfer.

## 2017-10-09 NOTE — PLAN OF CARE
"Problem: Patient Care Overview  Goal: Plan of Care/Patient Progress Review  Outcome: No Change  /54 (BP Location: Left arm)  Temp 98.9  F (37.2  C) (Oral)  Resp 18  Ht 1.727 m (5' 8\")  Wt 78.5 kg (173 lb 1 oz)  SpO2 94%  BMI 26.31 kg/m2        Pt afebrile, VSS.patient is alert to self only. Patients Bilateral groin puncture sites intact with dressing on. A-line site on her LUE ac site is covered with dressing and intact. Bilateral LE CMS intact.Patient incont of bladder overnight, attempted x1, but unable to void. Patient had BM x 1. Patients appears to rest between cares. Continue with current cares.  "

## 2017-10-09 NOTE — PROGRESS NOTES
"  Care Coordinator Progress Note     Admission Date/Time:  10/6/2017  Attending MD:  Laura Casillas MD     Data  Chart reviewed, discussed with interdisciplinary team.   Patient was admitted for: Abdominal aortic aneurysm (AAA) without rupture (H).    Concerns with insurance coverage for discharge needs: None.  Current Living Situation: Patient lives at [facility])-[phone].  Patients son, Juan Manuel, is supportive and involved in patients care  Support System: Supportive and Involved  Services Involved: [facility]  Transportation: TBD  Barriers to Discharge: medical clearance     Assessment  Patient is an 84 year old female who was transferred from [facility] and is now s/p endovascular repair of abdominal aortic aneurysm and left renal stent placement.  PT/OT consulted to assist with discharge planning, awaiting evaluations.  Per patients bedside nurse patient is requiring assist of 2 for transfers to commode/chair.  Per chart review patient was living at [facility](P: [phone]) prior to admission.  Met with patient to introduce RNCC role and discuss discharge planning.  Patient verified that [facility] was where she was living prior to admission to the hospital but responded that it was \"not by choice\".  Reports she does NOT want to return there at discharge and did not want to elaborate on reason why.  When asked what her plan was at discharge patient did not want to talk about it and would like to defer further conversation about discharge planning to her son, Juan Manuel.  Will plan to await PT/OT recommendations and speak with patients son regarding discharge planning.          Plan  Anticipated Discharge Date: 1-2 days  Anticipated Discharge Plan: TBD    RACHEL Cast  [phone]        "

## 2017-10-09 NOTE — PLAN OF CARE
Problem: Patient Care Overview  Goal: Plan of Care/Patient Progress Review  [id number]:  Tmax 100, AOVSS. Pt refused cares/meds majority of shift when not sleeping. Up to commode with heavy assist of 2. Denied pain, nausea. Umbilical hernia noted - not new, abdomen quite rounded. Cont POC.

## 2017-10-09 NOTE — PLAN OF CARE
Problem: Patient Care Overview  Goal: Plan of Care/Patient Progress Review  Outcome: No Change  Vitals:     10/08/17 2230 10/09/17 0346 10/09/17 0700 10/09/17 1237   BP: 119/68 108/54 133/68 110/60   BP Location:   Left arm Left arm Left arm   Resp: 20 18 18 18   Temp: 99.7  F (37.6  C) 98.9  F (37.2  C) 97.1  F (36.2  C) 97.8  F (36.6  C)   TempSrc: Axillary Oral Oral Oral   SpO2: 95% 94% 96% 96%   Weight:           Height:           AVSS. Npo c/o pain. Had breakfast this am. Son at bed side and helped with ordering BF. C/o nausea now. Gave 4mg of Zofran x1. No lunch yet. Order placed for a new piv. PT /OT consult ordered not seen yet. Had voided 275 cc beginning of shift. OOB commode at 1400. Unable to void. Bladder scanned for 414. Will SC pt per current orders.

## 2017-10-09 NOTE — PROGRESS NOTES
"CLINICAL NUTRITION SERVICES - ASSESSMENT NOTE     Nutrition Prescription    RECOMMENDATIONS FOR MDs/PROVIDERS TO ORDER:  Recommend pt be able to consistently consume at least 1350 kcal and 65 g PRO daily (~2/3 est. high-end kcal and PRO needs) vs need for additional nutrition intervention     Malnutrition Status:    Severe malnutrition in the context of chronic illness    Recommendations already ordered by Registered Dietitian (RD):  Magic Cup b/w meals    Future/Additional Recommendations:  Encourage patient to consume at least 75% of meals TID o, or 5-6 small frequent meals/snacks/supplements if more tolerable.  If consuming less than this offer supplements and/or scheduled snacks. Consider multivitamin with minerals if  PO intake remains inadequate.      REASON FOR ASSESSMENT  Sandy Sheffield is a/an 84 year old female assessed by the dietitian for Admission Nutrition Risk Screen for unintentional loss of 10# or more in the past two months and reduced oral intake over the last month    NUTRITION HISTORY  Pt eats a regular diet at home (allergic to shrimp), but says has had a poor appetite for a while.  Pt was unable to say how long this has been going on, but estimated at least for a few months.  Likes Boost, but does not care for Ensure.  Asked pt what a typical day of eating looks like, pt unable to say, only able to say she hasn't been eating much \"for a while\".  Per chart review, pt c/o abdominal pain \"for a while\" PTA, question if this contributing to poor PO intakes.    CURRENT NUTRITION ORDERS  Diet: Regular  Intake/Tolerance: Ate 1 meal this morning per pt's son, tolerated okay.      LABS  Labs reviewed    MEDICATIONS  Medications reviewed    ANTHROPOMETRICS  Height: 172.7 cm (5' 8\")  Most Recent Weight: 78.5 kg (173 lb 1 oz)    IBW: 63.6 kg   BMI: Overweight BMI 25-29.9  Weight History: Pt says UBW used to be 200#, has lost ~25# since her appetite decreased a few months ago (or more, see nutrition hx " above).  This indicates a ~13% wt loss.  Wt Readings from Last 10 Encounters:   10/08/17 78.5 kg (173 lb 1 oz)      Dosing Weight: 67 kg (adjusted based on lowest recent recorded wt 78.5 kg on 10/8 and IBW)    ASSESSED NUTRITION NEEDS  Estimated Energy Needs: 2356-6083 kcals/day (25 - 30 kcals/kg)  Justification: Maintenance  Estimated Protein Needs:  grams protein/day (1.2 - 1.5 grams of pro/kg)  Justification: Post-op and Repletion  Estimated Fluid Needs: (1 mL/kcal)   Justification: Maintenance or per provider pending fluid status    PHYSICAL FINDINGS  See malnutrition section below.    MALNUTRITION  % Intake: </=50% for >/= 1 month (severe)  % Weight Loss: > 7.5% in 3 months (severe)  Subcutaneous Fat Loss: None observed  Muscle Loss: Temporal and Facial & jaw region:  Mild (unclear how much age related vs PO intake decrease)  Fluid Accumulation/Edema: None noted per chart review  Malnutrition Diagnosis: Severe malnutrition in the context of chronic illness    NUTRITION DIAGNOSIS  Inadequate oral intake related to decreased appetite hindering PO intakes as evidenced by pt reports poor PO intakes and unintentional 13% wt loss over the past 3 months (estimated timeline)     INTERVENTIONS  Implementation  Nutrition Education: Provided education on role of RD and nutrition POC   Medical food supplement therapy   Calorie counts    Goals  Total avg nutritional intake to meet a minimum of 25 kcal/kg and 1.2 g PRO/kg daily (per dosing wt 67 kg).     Monitoring/Evaluation  Progress toward goals will be monitored and evaluated per protocol.     Mary Brandon RD, LD   7B RD Pager: [phone]

## 2017-10-09 NOTE — PLAN OF CARE
Problem: Patient Care Overview  Goal: Individualization & Mutuality  Outcome: No Change  Assisted to BSC. Unable to void. SC for 350cc or clear yellow urine at 1500. Continue to monitor status

## 2017-10-09 NOTE — PROGRESS NOTES
VASCULAR SURGERY PROGRESS NOTE    HPI:    Sandy Sheffield is an 84 year old female with past medical history of COPD and cardiac disease who was discovered to have a 9 cm abdominal aortic aneurysm during a presentation of pain of unclear etiology.  A non-contrast CT showed that she did not have adequate neck for standard endovascular repair, after careful consideration, it was felt that she warranted an attempt at repair rather than palliation and that open surgery would not be survivable for her.  Dr. Casillas considered her for an endovascular repair using attempts to preserve the renal arteries but recognizing that she may need to go on dialysis if this failed, which she was comfortable with.  On 10/7/2017, she underwent endovascular repair of abdominal aortic aneurysm and left renal stent placement with Dr. Casillas.    SUBJECTIVE:  Endorses adequate pain control.  Reports being hungry.    OBJECTIVE:  Vital signs:  Temp: 97.1  F (36.2  C) Temp src: Oral BP: 133/68   Heart Rate: 96 Resp: 18 SpO2: 96 % O2 Device: None (Room air)        Intake/Output Summary (Last 24 hours) at 10/09/17 1147  Last data filed at 10/09/17 1100   Gross per 24 hour   Intake          2264.17 ml   Output              275 ml   Net          1989.17 ml       Vitals:    10/07/17 1329 10/08/17 0400   Weight: 79.4 kg (175 lb) 78.5 kg (173 lb 1 oz)       PHYSICAL EXAM:  NEURO/PSYCH: The patient is alert and oriented.  Appropriate.  Moves all extremities.    SKIN: Color appropriate for race, warm, dry.  PULMONARY: non-labored breathing, not requiring supplemental oxygen   EXTREMITIES: left arm incision C/D/I. Bilateral groin puncture sites without obvious hematoma, lower extremities warm and well perfused.     BMP  Recent Labs  Lab 10/08/17  0251 10/07/17  1705 10/07/17  1559 10/07/17  1515 10/06/17  2323    138 138 137 134   POTASSIUM 3.6 3.6 3.5 3.1* 3.4   CHLORIDE 108  --   --   --  103   CO2 19*  --   --   --  24   BUN 27  --   --   --  29    CR 2.27*  --   --   --  2.17*   * 119* 114* 108* 108*   MAG 2.1  --   --   --   --    PHOS 3.3  --   --   --   --      CBC  Recent Labs  Lab 10/08/17  0251 10/07/17  1705 10/07/17  1559 10/07/17  1515 10/06/17  2323   WBC 10.8  --   --   --  7.9   HGB 9.9* 10.7* 10.3* 9.1* 11.1*     151  --   --   --  197     INR/PTT  Recent Labs  Lab 10/06/17  2323   INR 1.14   PTT 38*     ABG  Recent Labs  Lab 10/08/17  0827 10/07/17  1705 10/07/17  1559 10/07/17  1515   PH 7.37 7.16* 7.16* 7.24*   PCO2 32* 60* 61* 47*   PO2 66* 85 122* 89   HCO3 19* 21 22 20*         ASSESSMENT/PLAN:  #1 abdominal aortic aneurysm 9 cm  - status post endovascular repair of abdominal aortic aneurysm and left renal stent placement with Dr. Casillas on 10/7/2017  - continue to monitor creatinine and urine output  - advance diet as tolerated  - PT and OT consults  - spoke with  regarding discharge planning and possible need for TCU placement        Please do not hesitate to contact Dr. Casillas's vascular surgery team with any questions.       Jessica ELDER, CNS  Division of Vascular Surgery  [facility]  Pager [phone]

## 2017-10-09 NOTE — PLAN OF CARE
Problem: Patient Care Overview  Goal: Plan of Care/Patient Progress Review  Outcome: No Change  Vitals:     10/09/17 0346 10/09/17 0700 10/09/17 1237 10/09/17 1657   BP: 108/54 133/68 110/60 113/61   BP Location: Left arm Left arm Left arm Left arm   Pulse:       101   Resp: 18 18 18 18   Temp: 98.9  F (37.2  C) 97.1  F (36.2  C) 97.8  F (36.6  C) 100.4  F (38  C)   TempSrc: Oral Oral Oral Axillary   SpO2: 94% 96% 96% 95%   Weight:           Height:           Temp max of 100.4 axillary, other vss. Sating in mid 90s at RA. Gave IS and able to use it up to 500ml with encouragement and verbal cues. Rechecked temp of 98.6 orally. Needs assist 1-2 with transfers for safety. Does not like to sit up in bed or chair. Attempted few times but she only dangled for few min. Has being up to BSC x 2 this afternoon. Bm x1. C/o feeling dizzy when up. VS remained stable but HR increased to 114 with activity. C/o back pain and hydromorphone 0.2 given. Son at bed side. He is ordering her dinner. Continue to assist with and encourage activity.

## 2017-10-09 NOTE — PHARMACY-ADMISSION MEDICATION HISTORY
Admission medication history interview status for the 10/6/2017 admission is complete. See Epic admission navigator for allergy information, pharmacy, prior to admission medications and immunization status.     Medication history interview sources:  Patient, Son, CareEverywhere (Not up to date).     Changes made to PTA medication list (reason)  Added:        - Albuterol inhaler (PRN)  Deleted: None  Changed: None    Additional medication history information (including reliability of information, actions taken by pharmacist):       - Pt is a poor historian, but was able to confirm PTA med list and directions with some prompting.        - Pt is not up to date on her flu shot.       Prior to Admission medications    Medication Sig Last Dose Taking? Auth Provider   albuterol (PROVENTIL HFA) 108 (90 BASE) MCG/ACT Inhaler Inhale 2 puffs into the lungs every 6 hours as needed Past Week at Unknown time Yes Unknown, Entered By History   ASPIRIN PO Take 81 mg by mouth daily 10/5/2017 Yes Reported, Patient   ALPRAZolam (XANAX) 0.5 MG tablet Take 0.25 mg by mouth nightly as needed for anxiety (Anxiety)  Past Week at Unknown time Yes Reported, Patient   Omega-3 Fatty Acids (FISH OIL BURP-LESS PO) Take 1 capsule by mouth daily Past Week at Unknown time Yes Reported, Patient   VITAMIN D, CHOLECALCIFEROL, PO Take 2000 units by mouth daily Past Week at Unknown time Yes Reported, Patient         Medication history completed by: Christ Terrell (Justin), PharmD Candidate

## 2017-10-09 NOTE — PLAN OF CARE
Problem: Patient Care Overview  Goal: Plan of Care/Patient Progress Review  Patient temp 99.8, other VSS. Patient c/o of anxiety, given xanax x1. Sone at beside. Patient is [place], vision not very good at distance. Was not able to recognize son at the foot of the bed but when he got closer was able to recognized him. Up to commode x1 with about 100 cc urine OP. Denies any pain, nausea. She is up with 2 assists. LR at 125 cc/hr. Continue with plan of care.

## 2017-10-10 NOTE — PROGRESS NOTES
VASCULAR SURGERY PROGRESS NOTE    HPI:    Sandy Sheffield is an 84 year old female with past medical history of COPD and cardiac disease who was discovered to have a 9 cm abdominal aortic aneurysm during a presentation of pain of unclear etiology.  A non-contrast CT showed that she did not have adequate neck for standard endovascular repair, after careful consideration, it was felt that she warranted an attempt at repair rather than palliation and that open surgery would not be survivable for her.  Dr. Casillas considered her for an endovascular repair using attempts to preserve the renal arteries but recognizing that she may need to go on dialysis if this failed, which she was comfortable with.  On 10/7/2017, she underwent endovascular repair of abdominal aortic aneurysm and left renal stent placement with Dr. Casillas.    SUBJECTIVE:  Endorses right shoulder pain and nausea.  Son reports her vision is reduced since surgery.     OBJECTIVE:  Vital signs:  Temp: 98.5  F (36.9  C) Temp src: Axillary BP: 107/48 Pulse: 114 Heart Rate: 96 Resp: 19 SpO2: 96 % O2 Device: None (Room air)        Intake/Output Summary (Last 24 hours) at 10/09/17 1147  Last data filed at 10/09/17 1100   Gross per 24 hour   Intake          2264.17 ml   Output              275 ml   Net          1989.17 ml       Vitals:    10/07/17 1329 10/08/17 0400   Weight: 79.4 kg (175 lb) 78.5 kg (173 lb 1 oz)       PHYSICAL EXAM:  NEURO/PSYCH: The patient is alert and oriented.  Appropriate.  Moves all extremities. Strength equal, face symmetrical     SKIN: Color appropriate for race, warm, dry.  PULMONARY: non-labored breathing, not requiring supplemental oxygen   EXTREMITIES: left arm incision C/D/I. Bilateral groin puncture sites without obvious hematoma, lower extremities warm and well perfused. Palpable bilateral PT and DP pulses     BMP  Recent Labs  Lab 10/10/17  0703 10/09/17  1235 10/08/17  0251 10/07/17  1705  10/06/17  2323    136 137 138  < > 134    POTASSIUM 3.3* 3.7 3.6 3.6  < > 3.4   CHLORIDE 106 106 108  --   --  103   CO2 22 23 19*  --   --  24   BUN 28 27 27  --   --  29   CR 2.49* 2.54* 2.27*  --   --  2.17*   * 127* 115* 119*  < > 108*   MAG  --   --  2.1  --   --   --    PHOS  --   --  3.3  --   --   --    < > = values in this interval not displayed.  CBC  Recent Labs  Lab 10/10/17  0703 10/08/17  0251 10/07/17  1705 10/07/17  1559  10/06/17  2323   WBC 16.3* 10.8  --   --   --  7.9   HGB 9.4* 9.9* 10.7* 10.3*  < > 11.1*    151  151  --   --   --  197   < > = values in this interval not displayed.  INR/PTT    Recent Labs  Lab 10/06/17  2323   INR 1.14   PTT 38*     ABG    Recent Labs  Lab 10/08/17  0827 10/07/17  1705 10/07/17  1559 10/07/17  1515   PH 7.37 7.16* 7.16* 7.24*   PCO2 32* 60* 61* 47*   PO2 66* 85 122* 89   HCO3 19* 21 22 20*         ASSESSMENT/PLAN:  #1 abdominal aortic aneurysm 9 cm  - status post endovascular repair of abdominal aortic aneurysm and left renal stent placement with Dr. Casillas on 10/7/2017  - continue to monitor creatinine and urine output  - advance diet as tolerated  - PT and OT consults ordered, evaluations pendning  - spoke with  regarding discharge planning and  probable need for TCU placement  - possible discharge tomorrow if stable  - follow up with ophthalmologist as outpatient regarding reduced vision.         Please do not hesitate to contact Dr. Casillas's vascular surgery team with any questions.       Jessica ELDER, CNS  Division of Vascular Surgery  [facility]  Pager [phone]

## 2017-10-10 NOTE — PLAN OF CARE
Problem: Patient Care Overview  Goal: Plan of Care/Patient Progress Review  OT-7B: Cancel. Pt currently experiencing right chest pain. Pt not appropriate at this time. Will reschedule for 10/11.

## 2017-10-10 NOTE — PLAN OF CARE
"Problem: Patient Care Overview  Goal: Plan of Care/Patient Progress Review  Outcome: No Change  1900-0730  Tmax 99, other VSS. Answers all orientation questions appropriately, but confused when discussing plan of care; patient is [place] and has poor vision. Patient c/o abdominal pain but denies any intervention, states \"I should have never had surgery done\". Up with assist 1-2 using GB, patient needs continuous cues while out of bed and transferring (ex: turn to the left, reach for the railing, bend your knees, etc.) possibly related to vision barrier. Dizziness with all movements, even turning side to side in bed which then results in nausea and retching/gagging/dry heaves, 8mg Zofran was ineffective. Minimal urine output when up to the commode, then requested the bedpan once lying in bed. Incontinent of urine with dry heaves. Refuses repositioning due to feeling dizzy. Patient requested Xanax at bedtime. Continue to monitor and follow POC.       "

## 2017-10-10 NOTE — PLAN OF CARE
Problem: Patient Care Overview  Goal: Plan of Care/Patient Progress Review  Outcome: No Change  Vitals:     10/10/17 0752 10/10/17 1220 10/10/17 1239 10/10/17 1503   BP: 107/48 132/59   118/67   BP Location: Left arm Left arm   Left arm   Pulse:           Resp: 19 22 22   Temp: 98.5  F (36.9  C) 99.7  F (37.6  C)   98.2  F (36.8  C)   TempSrc: Axillary Oral   Oral   SpO2: 96% 94% 94% 91%   Weight:           Height:           AVSS. Sating 91-94 % at room air at rest while awake. Using IS to 500ml with encouragements. Declined being up in chair. Needs assist of 1-2 to be transferred to BSC. Uses bed pan.  Alert and oriented x4. [place] with bilateral vision difficulties. Unable to see clearly. C/o double vision. Vision is worse since procedure per son. Primary team aware. C/o right chest pain with movement. Hot pack x 1. Hydromorphone iv given x1. C/o nausea with movement. Zofran 4 mg iv x1. WBC this am of 16.3. Primary team updated. Pt triggered a lactic labs done= 0.9. EKG done. Currently resting in bed. Son at bed side. Continue to monitor status.

## 2017-10-10 NOTE — CONSULTS
Social Work: Assessment with Discharge Plan    Patient Name:  Sandy Sheffield  :  1932  Age:  84 year old  MRN:  [id number]  Risk/Complexity Score:  Filed Complexity Screen Score: 6  Completed assessment with:  Chart review, pt, pt's son Valerie    Presenting Information   Reason for Referral:  Discharge plan and Potential need for community services upon discharge  Date of Intake:  October 10, 2017  Referral Source:  Chart Review, sw auto finding  Decision Maker:  Pt at baseline  Alternate Decision Maker:  Pt reported she would want her son Juan Manuel to be her alternate decision maker and reported that her adopted son Félix tried to make himself her decision maker in the past and pt very much does not want this. Pt expressed interest in completing a health care directive.   Health Care Directive:  Patient considering completing and Provided education. Sw returned with health care directive forms for pt but pt was holding an emesis bag and having labs drawn. Sw will attempt to return at a later time.   Living Situation:  [facility] assisted living/adult foster care  Previous Functional Status:  Independent aside from using a walker.   Patient and family understanding of hospitalization: Good  Cultural/Language/Spiritual Considerations:  Pt is an english speaking female whom per face sheet identifies as Zoroastrianism  Adjustment to Illness:  Pt reported wishing she had not had her surgery and discussed how it was a rough surgery. Pt expressed regrets about having had surgery and discussed how her life has been turned upside down due to the after effects of the surgery.     Physical Health  Reason for Admission:    1. Abdominal aortic aneurysm (AAA) without rupture (H)      Services Needed/Recommended:  Other:  Potential TCU    Mental Health/Chemical Dependency  Diagnosis:  None per H&P. Pt denied any concerns.   Support/Services in Place:  NA  Services Needed/Recommended:  NA    Support  System  Significant relationship at present time:  Pt's sons Juan Manuel and Nahid. Pt asked that sw contact her son Juan Manuel about TCU locations of interest.  Family of origin is available for support:  Yes. Pt's son Félix is also involved but level of support provided by him is unknown.   Other support available:  Staff at [facility]  Gaps in support system:  Unknown  Patient is caregiver to:  None     Provider Information   Primary Care Physician:  Cecilio Montes   [phone]   Clinic:  [facility] [address] / [place] [id number]      :  Unknown    Financial   Income Source:  Retired  Financial Concerns:  None identified  Insurance:    Payor/Plan Subscriber Name Rel Member # Group #   [employer] - [employer]* CH[employer] JACKSON  [id number]       [id number]       Discharge Plan   Patient and family discharge goal:  Pt would ultimately like to live with her son Juan Manuel but is agreeable to TCU hopefully in the [place] area upon d/c from hospital. Pt very much does not want to return to [facility] and reported that the staff there are bossy and controlling, almost to the point of bullying.   Provided education on discharge plan:  YES- provided education on TCU cares if recommended. Pt reported that getting strength back is needed.   Patient agreeable to discharge plan:  Pt is agreeable to TCU if needed, awaiting PT/OT recommendations. Pt reported having chest pain, dizziness, and vision changes while visiting and sw informed pt's team of this. PT/OT have not worked with pt today due to pain concerns.   A list of Medicare Certified Facilities was provided to the patient and/or family to encourage patient choice. Patient's choices for facility are:  Pt's son Nahid was given a list of TCUs by RNCC yesterday. Pt is agreeable to referrals being made to his facilities of choice.   Will NH provide Skilled rehabilitation or complex medical:  Dependant on PT/OT recommendations but likely will.  General information  "regarding anticipated insurance coverage and possible out of pocket cost was discussed. Patient and patient's family are aware patient may incur the cost of transportation to the facility, pending insurance payment: YES aside from transportation  Barriers to discharge:  Medical readiness, determination of d/c needs    Discharge Recommendations   Anticipated Disposition:  Dependant on PT/OT evals and recommendations  Transportation Needs:  Other:  Unknown at this time  Name of Transportation Company and Phone:  [employer] if needed- [phone]    Additional comments   Sw called pt's son Juan Manuel ([phone]) and left vm asking for return call to discuss d/c planning, waiting to hear back. Sw also called pt's son Nahid ([phone]) and left a vm, awaiting return call.     Sw was later notified by pt's bedside nurse that pt's son Valerie was requesting to talk with sw. Sw went to pt's room and obtained pt's permission to talk with Pat. Pt reported that a TCU in the [facility] or at least [facility] would be preferred if that is what is recommended. Pat reported that no matter where pt goes she will not be happy and reported that pt has 11 children and feels that one of them should \"take her in\" but none are able to financially or care rosado. Pat reported there is no preference as to particular facilities and asked that sw make various referrals. Sw explained TCU cares to Pat.     Sw contacted the following TCUs to inquire about bed availability:  [facility] (p. [phone])- left vm for admissions, waiting to hear back. Referral will be faxed when pt or son's permission is obtained.  Sw attempted to meet with pt again but pt was busy with other providers.   - [facility] (p. [phone])- left vm for admissions, waiting to hear back. Referral will be faxed when pt or son's permission is obtained. Sw attempted to meet with pt again but pt was busy with other providers.     Christine Woodruff, JIA, " MSW  7B   [phone] (pager) [phone]  10/10/2017

## 2017-10-10 NOTE — PLAN OF CARE
Problem: Patient Care Overview  Goal: Plan of Care/Patient Progress Review  PT 7B: Orders received yesterday, eval attempted however pt c/o severe uncontrolled R sided chest pain. Pt N/A for eval at current time, will re-assess this PM.

## 2017-10-10 NOTE — PROVIDER NOTIFICATION
Vitals:    10/09/17 1815 10/09/17 2258 10/10/17 0300 10/10/17 0752   BP:  108/54  107/48   BP Location:  Right arm  Left arm   Pulse:       Resp:  20 20 19   Temp: 98.6  F (37  C) 99  F (37.2  C)  98.5  F (36.9  C)   TempSrc: Oral Oral  Axillary   SpO2:  95% 96% 96%   Weight:       Height:       Pt still c/o right chest pain. More intense with deep breathing and activity. No pain at rest. Jessica Moya updated. Pt was seen by in rounds this am. PT visit this am but did not work with pt due to c/o pain. Has hydromorphone for pain no other pain meds ordered currently. Son was at bed side earlier in am. Will be back later in the afternoon. Continue to monitor pt's status.

## 2017-10-11 NOTE — PLAN OF CARE
Problem: Patient Care Overview  Goal: Plan of Care/Patient Progress Review  OT-7B: Cancel. Pt to have xray and MRI today. Will await findings and initiate as appropriate.

## 2017-10-11 NOTE — PROGRESS NOTES
"  VSS. Forgetful. O2 sats in high 90s on 1 L O2. Continue to report dizziness. Scopolamine patch behind left ear with no change in dizziness. K+ 3.3. Refusing either IV or PO potassium replacement at the start of shift, stating \"I want to sleep. Leave me alone\". Reports intermittent chest pain. MD aware. Pt was agreeable to receiving K+replacement later on. 20 mEq K+ IV with lidocaine given, third bag is infusing now, pt to receive another 10 mEq and recheck K+levels after replacement is complete. . Voiding on bedpan and had large episode of urine incontinence. Bilateral groin sites CDI, derma bonded. LR infusing at 50 ml/hr. Continue to monitor.  "

## 2017-10-11 NOTE — PLAN OF CARE
"Problem: Patient Care Overview  Goal: Plan of Care/Patient Progress Review  Outcome: No Change  /53 (BP Location: Left arm)  Pulse 114  Temp 97.8  F (36.6  C) (Oral)  Resp 20  Ht 1.727 m (5' 8\")  Wt 78.5 kg (173 lb 1 oz)  SpO2 95%  BMI 26.31 kg/m2     Afebrile, VSS.  HR trending in 90s this shift.  C/o significant dizziness this shift, pt too dizzy to dangle at bedside or get up in chair.  Also c/o vision deficits this shift which have been ongoing this admission but not baseline.  Facial symmetry and motor strength intact, but decreased peripheral vision such that she cannot see her bedside table or a cup held under her chin.  Pt also have c/o right-sided chest pain which is very intermittent but sharp.  EKG shows occasional PVCs.  Vascular surgery rounded this shift and is aware of dizziness, vision deficits, and chest pain.  Received scheduled tylenol and dilaudid x1.  Some confusion at times, needs reminding of POC.  Plan to anticipate possible neuro consult per vascular surgery resident.  "

## 2017-10-11 NOTE — PROVIDER NOTIFICATION
Pt refusing OOB activity d/t dizziness. Unable to obtain orthostatic BP at this time, provider notified.

## 2017-10-11 NOTE — PROGRESS NOTES
"Social Work Services Progress Note    Hospital Day: 6  Date of Initial Social Work Evaluation:  10/10/17- please see for details   Collaborated with:  Chart review, team rounds, Vascular team, pt, pt's son Nahid and daughter Mary, nursing facilities    Data:  Pt is being followed for potential TCU placement as well as support following AAA repair. Pt's family has previously requested a TCU in the [facility] area and pt was agreeable to this yesterday.     Intervention:  Sw met with pt and pt's son Nahid in pt's room and spoke with pt's son Mary on the phone in pt's room. Sw spoke with Mary and explained that PT/OT have yet to see pt and make recommendations but that initial TCU referrals can be made without their recommendations and sw informed Mary of the TCUs closest to [facility] ([facility], [facility] and [facility]). Mary requested that referrals be made to these 3 in this order. Sw informed pt of this information and pt expressed being \"bitter\" about how none of her children took her in when she needed somewhere to live and questioned how they are working together now. Nahid assured pt that Juan Manuel is still pt's main contact and that all of the siblings are working together on next steps for pt. Pt then said she doesn't want to be in [place] and when sw asked for pt's permission to send referrals to the above mentioned facilities pt reported she didn't care and then commented that if she were to fall over dead that would be ok and that she doesn't want to be living anymore and said she is \"sick of this\". Sw informed Vascular team of this and when present with pt pt presented with much frustration and anger and it was not an appropriate time to further explore these comments. This also was not an appropriate time to discuss health care directive further with pt and pt's son Nahid had commented that pt has had some confusion this morning.     Referrals were faxed via [employer] to [facility] and [facility]. Call was placed to Angelina Laughlin (p. [phone], has access to Epic) to initiate referral to [facility], left vm and waiting to hear back. Qian called back reporting bed availability and that pt can be reviewed, she will send referral to the facility for review, waiting to hear back.     Assessment:  See bedside RN, PT/OT, medical team notes    Plan:    Anticipated Disposition:  Dependant on PT/OT evals and recs    Barriers to d/c plan:  Medical readiness, determination of d/c needs    Follow Up:  TBD, sw will continue to follow     SEE Anton, MSW  7B   [phone] (pager) [phone]  10/11/2017

## 2017-10-11 NOTE — PLAN OF CARE
Problem: Patient Care Overview  Goal: Plan of Care/Patient Progress Review  PT / 7B - Cancel. Pt continued to experience chest pain this day and had further scans and tests completed throughout day.  Initial PT evaluation scheduled 10/12.

## 2017-10-11 NOTE — PROGRESS NOTES
VASCULAR SURGERY PROGRESS NOTE    HPI:    Sandy Sheffield is an 84 year old female with past medical history of COPD and cardiac disease who was discovered to have a 9 cm abdominal aortic aneurysm during a presentation of pain of unclear etiology.  A non-contrast CT showed that she did not have adequate neck for standard endovascular repair, after careful consideration, it was felt that she warranted an attempt at repair rather than palliation and that open surgery would not be survivable for her.  Dr. Casillas considered her for an endovascular repair using attempts to preserve the renal arteries but recognizing that she may need to go on dialysis if this failed, which she was comfortable with.  On 10/7/2017, she underwent endovascular repair of abdominal aortic aneurysm and left renal stent placement with Dr. Casillas.    SUBJECTIVE:  Endorses continued nausea and dizziness with movement. Vision remains blurry.  Intermittent right sided chest wall pain    OBJECTIVE:  Vital signs:  Temp: 97.6  F (36.4  C) Temp src: Oral BP: 115/59   Heart Rate: 92 Resp: 18 SpO2: 96 % O2 Device: Nasal cannula Oxygen Delivery: 1 LPM      Intake/Output Summary (Last 24 hours) at 10/09/17 1147  Last data filed at 10/09/17 1100   Gross per 24 hour   Intake          2264.17 ml   Output              275 ml   Net          1989.17 ml       Vitals:    10/07/17 1329 10/08/17 0400   Weight: 79.4 kg (175 lb) 78.5 kg (173 lb 1 oz)       PHYSICAL EXAM:  NEURO/PSYCH: The patient is alert and oriented.  Appropriate.  Moves all extremities. Strength equal, face symmetrical     SKIN: Color appropriate for race, warm, dry.  PULMONARY: non-labored breathing, not requiring supplemental oxygen   EXTREMITIES: left arm incision C/D/I. Bilateral groin puncture sites without obvious hematoma, lower extremities warm and well perfused. Palpable bilateral PT and DP pulses     BMP    Recent Labs  Lab 10/11/17  0806 10/10/17  0703 10/09/17  1235 10/08/17  0251     136 136 137   POTASSIUM 4.0 3.3* 3.7 3.6   CHLORIDE 108 106 106 108   CO2 22 22 23 19*   BUN 28 28 27 27   CR 2.38* 2.49* 2.54* 2.27*   * 119* 127* 115*   MAG  --   --   --  2.1   PHOS  --   --   --  3.3     CBC  Recent Labs  Lab 10/11/17  0806 10/10/17  0703 10/08/17  0251 10/07/17  1705  10/06/17  2323   WBC 13.3* 16.3* 10.8  --   --  7.9   HGB 9.3* 9.4* 9.9* 10.7*  < > 11.1*    153 151  151  --   --  197   < > = values in this interval not displayed.  INR/PTT    Recent Labs  Lab 10/06/17  2323   INR 1.14   PTT 38*     ABG    Recent Labs  Lab 10/08/17  0827 10/07/17  1705 10/07/17  1559 10/07/17  1515   PH 7.37 7.16* 7.16* 7.24*   PCO2 32* 60* 61* 47*   PO2 66* 85 122* 89   HCO3 19* 21 22 20*         ASSESSMENT/PLAN:  #1 abdominal aortic aneurysm 9 cm  - status post endovascular repair of abdominal aortic aneurysm and left renal stent placement with Dr. Casillas on 10/7/2017  - continue to monitor creatinine and urine output  - advance diet as tolerated  - PT and OT consults ordered, evaluations pending, not performed on 10/10 due to chest wall pain  - spoke with  regarding discharge planning and  probable need for TCU placement  - scopolamine patch applied on 10/10/2017    - will obtain brain MRI  - consult neurology for dizziness and blurred vision   - orthostatic blood pressures  - discontinue narcotics and use lidocaine patches for discomfort  - will obtain CXR to evaluate for rib fracture    #2 severe malnutrition  - weight loss > 7.5% in 3 months  - mild-moderate muscle loss  - poor po intake, nausea  - continue calorie counts   - encourage po intact, consume at least 75% of meals TID   - continue magic cups between meals    Please do not hesitate to contact Dr. Casillas's vascular surgery team with any questions.       Jessica ELDER, CNS  Division of Vascular Surgery  [facility]  Pager [phone]

## 2017-10-11 NOTE — PROGRESS NOTES
Calorie Counts  Intake recorded for: 10/10  Kcals: 241  Protein: 12g  # Meals Recorded: 100% milk  # Supplements Recorded: 50% 1 Magic Cup

## 2017-10-11 NOTE — PLAN OF CARE
Problem: Patient Care Overview  Goal: Plan of Care/Patient Progress Review  Outcome: No Change  VSS. Reports pain in R lower chest/upper abd, lido patches in place. Chest XR completed, plan for MRI today, checklist sent. Neurology consult ordered, vision deficits continue. Intermittently forgetful, otherwise AOx4. PO intake fair, on tomy counts. BG checks 108 and 98. Orthostatic BP ordered, see previous note. Asst x2 to bedpan, voiding, no BM. Son at bedside, supportive. Pt intermittently frustrated and irritable, anxious regarding plan of care. Cont POC.

## 2017-10-11 NOTE — CONSULTS
Neurology Consult    CC/Reason for consult: Dizziness and blurred vision    HPI: Sandy Sheffield is a 84 year old female with PMH significant for DMII, HTN, CAD who presented from an OSH with expanding 9cm abdominal aortic aneurysm. Endovascular repair done on 10/7 without reported complications. Post-surgery patient had new onset dizziness, blurry vision, headache and neck pain. General neurology consulted on 10/11. Patient has no history of dizziness, diminished vision or chronic headaches. Does have hx of cataract surgery (date unknown), wears reading glasses. Dizziness is not present at rest, but worsens with standing and head turning in bed. Feels like both lightheadedness & spinning sensation (though she cannot recall which direction). During episodes, she feels spinning even with eyes closed. Associated symptoms include nausea with non-bloody emesis. She has been unable to do PT/OT due to this dizziness. Her vision has been diminished since the surgery and is constant. Associated symptoms include bi-lat eye pain (unable to localize), a cassi feeling in her eyes. Nothing makes the eye pain and vision problems better or worse including lights and movement. Her headache is intermittent and is 6/10 and dull at its worst. Nothing makes it better or worse. No history of migraine or headaches. She reports new onset posterior neck pain which is generalized and worsened with side to side movements. No history of cervical trauma or surgery. Patient denies any numbness, paresthesia or weakness on head to toe ROS. She is having intermittent right sided chest pain & abdominal pain which is new but no SOB. No other reported concerns.     ROS: Negative except as stated above.    PMHx/PSHx:  History reviewed. No pertinent past medical history.  Past Surgical History:   Procedure Laterality Date     ENDOVASCULAR REPAIR ANEURYSM ABDOMINAL AORTA N/A 10/7/2017    Procedure: ENDOVASCULAR REPAIR ANEURYSM ABDOMINAL AORTA;   "-Endovascular Repair Abdominal Aortic Aneurysm  with Endorant with Limb Extention x1.  - Aortic Dissection x1   -Revision of left Brachial  Artery Repair   -Arterial limited duplex study;  Surgeon: Laura Casillas MD;  Location: [facility] OR       Medications:    lidocaine  2 patch Transdermal Q24H     lidocaine   Transdermal Q24h     lidocaine   Transdermal Q8H     scopolamine  1 patch Transdermal Q72H    And     scopolamine   Transdermal Q8H    And     [START ON 10/13/2017] scopolamine   Transdermal Q72H     insulin aspart  1-3 Units Subcutaneous TID AC     insulin aspart  1-3 Units Subcutaneous At Bedtime     albuterol  2 puff Inhalation 4x Daily     sodium chloride (PF)  3 mL Intracatheter Q8H     heparin  5,000 Units Subcutaneous Q8H       Allergies:  Penicillins and Shrimp    Social Hx: 1ppd x 65 years; quit January 2017. No alcohol or elicit drug use.    Family Hx: Brother has diabetes. No family history of dizziness, migraines or eye problems.     Exam:  /59 (BP Location: Left arm)  Pulse 114  Temp 97.6  F (36.4  C) (Oral)  Resp 18  Ht 1.727 m (5' 8\")  Wt 78.5 kg (173 lb 1 oz)  SpO2 96%  BMI 26.31 kg/m2    Gen: NAD   Neck: no meningismus  Res: no labored breathing  Abd: soft  Ext: no leg edema    Neuro:   Mental status: awake, fluent speech, follows 2-step commands. Oriented to age/location/time.  Cranial nerves: 20/70 right and 20/40 left visual acuity with reading glasses. PERRL, EOMI (left gaze preference but can overcome w/ oculocephalic manouver), no blinking to right visual threat, right visual field cut, sensation to LT intact in V1-3 destitution, questionable R facial droop but symmetric smile, no dysarthria, tongue protrusion midline, symmetric shoulder shrug. Corrective saccades w/ head impulse. No nystagmus. No skew deviation. Unable to do franco-hallpike due to nausea.  Motor: normal tone, 5/5 strength throughout, no tremor or asterixis.   Sensory: intact & symmetric to LT throughout, no " extinction. Negative pronator drift.  Reflexes: 2+ symmetric upper extremities, absent lower extremity reflexes, neutral toes, no clonus.  Coordination: FNF & HKS without dysmetria.   Gait: Unable to get out of bed due to onset and worsening of dizziness and nausea.    Impression:   Sandy Sheffield is a 84 year old female with PMH significant for DMII, HTN, CAD who presented from an OSH with expanding 9cm abdominal aortic aneurysm. Endovascular repair done on 10/7 without reported complications. Post-surgery patient had new onset dizziness, blurry vision, headache and neck pain. General neurology consulted on 10/11 for further evaluation.    # Right hemianopsia - concerning for possible vascular event.  # Lightheadedness & vertigo - Unclear etiology. Has findings on exam (R VF cut) concerning for stroke; but also has some findings that may suggest peripheral etiology (corrective saccades w/ head impulse). History of worsening lightheadedness with standing & moving may suggest orthostatic hypotension or BPPV.    Recommendations:  - MRI brain with MRA angio head/neck w/out contrast due to CKD (added on latter studies).  - check orthostatic vitals.  - further recs pending above w/u.  - we will re-assess tomorrow.    Kevin Reid, MS4, acted as a scribe in preparation of this note.    Patient seen & discussed with Neurology Staff, Dr. Foreman.     Destiny Daley  G3 Neurology    ATTENDING ADDENDUM: Patient seen and examined with resident Dr Daley today. Medical student Kevin Reid was acting as scribe on my behalf. Agree with assessment and recs as above. TT spent for patient care 35 minutes; more than half was counseling. Ms Sheffield reports new onset neck pain, dizziness aggravated by head motion, and blurry vision 4 days after her endovascular repair of a large AAA. Examination shows right homonymous hemianopia. She may have an ischemic stroke affecting the visual pathway posteriorly to the optic chiasm. Etiology  of dizziness with head motion unclear- she has negative horizontal head impulse test, no spontaneous nystagmus, but [place] Hallpike cannot be easily done at bedside for many reasons. Would rule out central vestibular/brainstem lesion. She needs MRI with stroke protocol. Will follow. Alejandro Foreman MD

## 2017-10-12 NOTE — PLAN OF CARE
Problem: Patient Care Overview  Goal: Plan of Care/Patient Progress Review  PT / 7B     Discharge Planner PT   Patient plan for discharge: not to return to previous place of living  Current status: Performed bed mobility with elevated HOB + SBA, sit->stand transfer with step pivot transfer to chair + CGA.  Amb within room with 4WW + CGA.  Barriers to return to prior living situation: Level of assist, reduced activity tolerance, dizziness  Recommendations for discharge: TCU vs group home  Rationale for recommendations: Pending continued progression with therapy and length of stay, pt would benefit from continued skilled rehab to return to PLOF.  Pt voiced not wanting to return to previous living situation.  Will continue to assess and update recommendation as able.        Entered by: Jazmin Pierce 10/12/2017 4:43 PM

## 2017-10-12 NOTE — PROGRESS NOTES
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    Discussed with Vascular Fellow, Dr. Carcamo, and Neurology resident (who discussed with staff Dr. Bazzi).    - CTA tonight   - Will increase IVF to 100/hr  - No anticoagulation for now.  - Risk of SANDRO with contrast discussed with patient, patient aware and accepting of risks and understands that we will attempt to mitigate risk with increased IVF    Kati Ramirez  General Surgery PGY1  [id number]

## 2017-10-12 NOTE — PROGRESS NOTES
10/12/17 1600   Quick Adds   Type of Visit Initial PT Evaluation       Present no   Language English   Living Environment   Lives With facility resident   Living Arrangements group home   Home Accessibility no concerns   Number of Stairs to Enter Home 0   Number of Stairs Within Home 0   Living Environment Comment Lives at group home - reports she does not prefer to return to the same home   Self-Care   Usual Activity Tolerance moderate   Current Activity Tolerance fair   Regular Exercise no   Equipment Currently Used at Home walker, rolling;grab bar;shower chair   Functional Level Prior   Ambulation 1-->assistive equipment   Transferring 0-->independent   Toileting 0-->independent   Bathing 0-->independent   Dressing 0-->independent   Eating 0-->independent   Communication 0-->understands/communicates without difficulty   Swallowing 0-->swallows foods/liquids without difficulty   Cognition 0 - no cognition issues reported   Fall history within last six months no   Which of the above functional risks had a recent onset or change? ambulation;transferring   Prior Functional Level Comment Reports performing ADLs independently and amb with 4WW   General Information   Onset of Illness/Injury or Date of Surgery - Date 10/06/17   Referring Physician Jessica Moya, JERROD CNS   Pertinent History of Current Problem (include personal factors and/or comorbidities that impact the POC) Sandy Sheffield is a 84 year old female with PMH significant for DMII, HTN, CAD who presented from an OSH with expanding abdominal aortic aneurysm. She is currently hemodynamically stable and does not need emergent operative repair.    Precautions/Limitations fall precautions   General Info Comments Activity: Up with assist   Cognitive Status Examination   Level of Consciousness alert   Follows Commands and Answers Questions 100% of the time   Personal Safety and Judgment intact   Posture    Posture Forward head  "position;Protracted shoulders   Range of Motion (ROM)   ROM Comment B LE WNL/WFL   Strength   Strength Comments B LE > 3/5 secondary to functional mobility   Bed Mobility   Bed Mobility Comments SBA with elevated HOB   Transfer Skills   Transfer Comments CGA   Gait   Gait Comments 4WW + CGA   Clinical Impression   Criteria for Skilled Therapeutic Intervention yes, treatment indicated   PT Diagnosis Impaired functional mobility   Influenced by the following impairments Impaired functional strength, balance, endurance   Functional limitations due to impairments Impaired bed mobility, transfers, gait   Clinical Presentation Evolving/Changing   Clinical Presentation Rationale Home situation, Complex medical history   Clinical Decision Making (Complexity) Moderate complexity   Therapy Frequency` 5 times/week   Predicted Duration of Therapy Intervention (days/wks) 10/21/17   Anticipated Discharge Disposition Transitional Care Facility;Other (see comments)  (Return to group home)   Risk & Benefits of therapy have been explained Yes   Patient, Family & other staff in agreement with plan of care Yes   [facility] AM-PAC  \"6 Clicks\" V.2 Basic Mobility Inpatient Short Form   1. Turning from your back to your side while in a flat bed without using bedrails? 3 - A Little   2. Moving from lying on your back to sitting on the side of a flat bed without using bedrails? 3 - A Little   3. Moving to and from a bed to a chair (including a wheelchair)? 3 - A Little   4. Standing up from a chair using your arms (e.g., wheelchair, or bedside chair)? 3 - A Little   5. To walk in hospital room? 3 - A Little   6. Climbing 3-5 steps with a railing? 2 - A Lot   Basic Mobility Raw Score (Score out of 24.Lower scores equate to lower levels of function) 17   Total Evaluation Time   Total Evaluation Time (Minutes) 5     "

## 2017-10-12 NOTE — PLAN OF CARE
"Problem: Patient Care Overview  Goal: Plan of Care/Patient Progress Review  Outcome: No Change  /68 (BP Location: Right arm)  Pulse 114  Temp 99.5  F (37.5  C) (Oral)  Resp 16  Ht 1.727 m (5' 8\")  Wt 78.5 kg (173 lb 1 oz)  SpO2 92%  BMI 26.31 kg/m2     Low grade temp at times, Tmax 99.5.  Tachycardic at times, trends in 90s and max 104. Sepsis protocol triggered, lactic = 0.7.  MRI completed, pending interpretation from radiologist.  Potassium replacement completed on days, now 4.2.  Confusion continues today at times, forgets plan of care, forgets education regarding lidocaine patches and removes them thinking they are tape.  When she is not confused, she seems more present in conversation and is agitated with staff and situation.  Received heparin per order.  Uncooperative at times.  Needs orthostatic blood pressures checked but unsuccessful due to c/o dizziness and poor cooperation.  Ate cottage cheese and milk for supper but poor appetite and needs lots of encouragement.  Plan of care reviewed with son.  Continue POC.      "

## 2017-10-12 NOTE — PLAN OF CARE
Problem: Patient Care Overview  Goal: Plan of Care/Patient Progress Review  Outcome: No Change  T-max 99.6-99.2, other vitals stable, 02 sats mid 90's on room air, when returned from CT, sats mid 80's on room air, placed 2 LPM/nc on and sats now in mid 90's, oriented x4, occasionally forgetful, easily reoriented, calm and cooperative tonight, no c/o pain, cms intact with positive pulses on all 4 extremities, denies N/T, neuros intact, no visual c/o, voided x2 on bedpan, drinking water, offers no c/o, appeared to rest/sleep after 0215

## 2017-10-12 NOTE — PROGRESS NOTES
Calorie Counts  Intake recorded for: 10/11  Kcals: 311  Protein: 16g  # Meals Recorded: 50% breakfast (yogurt, milk, 2 slices toast with butter and jelly, 2 andino strips), and 25% cottage cheese  # Supplements Recorded: 0

## 2017-10-12 NOTE — CONSULTS
Neuroscience Intensive Care   10/12/2017    Sandy Sheffield is a 84 year old female with PMH significant for DMII, HTN, CAD who presented from an OSH with expanding 9cm abdominal aortic aneurysm. Endovascular repair done on 10/7 without reported complications. Post-surgery patient had new onset dizziness, blurry vision, headache and neck pain. General neurology consulted on 10/11 for further evaluation. MRI showed abnormal diffusion restriction in the left greater than right occipital lobes and bilateral inferior cerebellar hemispheres consistent with acute infarction.      24 Hour Vital Signs Summary:  Temperatures:  Current - Temp: 98.1  F (36.7  C); Max - Temp  Av.8  F (37.1  C)  Min: 97.3  F (36.3  C)  Max: 99.6  F (37.6  C)  Respiration range: Resp  Av.8  Min: 16  Max: 26  Pulse range: Pulse  Av  Min: 86  Max: 86  Blood pressure range: Systolic (24hrs), Av , Min:105 , Max:137   ; Diastolic (24hrs), Av, Min:53, Max:73    Pulse oximetry range: SpO2  Av.4 %  Min: 88 %  Max: 99 %    Ventilator Settings  FiO2 (%): 2 %  Resp: 18      Intake/Output Summary (Last 24 hours) at 10/12/17 1839  Last data filed at 10/12/17 1800   Gross per 24 hour   Intake             2537 ml   Output              975 ml   Net             1562 ml            Current Medications:    lidocaine  2 patch Transdermal Q24H     lidocaine   Transdermal Q24h     lidocaine   Transdermal Q8H     scopolamine  1 patch Transdermal Q72H    And     scopolamine   Transdermal Q8H    And     [START ON 10/13/2017] scopolamine   Transdermal Q72H     insulin aspart  1-3 Units Subcutaneous TID AC     insulin aspart  1-3 Units Subcutaneous At Bedtime     albuterol  2 puff Inhalation 4x Daily     sodium chloride (PF)  3 mL Intracatheter Q8H       PRN Medications:  potassium chloride, potassium chloride, potassium chloride, potassium chloride with lidocaine, potassium chloride, magnesium sulfate, potassium phosphate (KPHOS) in D5W IV,  "potassium phosphate (KPHOS) in D5W IV, potassium phosphate (KPHOS) in D5W IV, potassium phosphate (KPHOS) in D5W IV, ondansetron **OR** ondansetron, glucose **OR** dextrose **OR** glucagon, melatonin, albuterol, ALPRAZolam, lidocaine (buffered or not buffered), lidocaine 4%, sodium chloride (PF), alum & mag hydroxide-simethicone, prochlorperazine, naloxone    Infusions:    lactated ringers 100 mL/hr at 10/12/17 0000       Allergies   Allergen Reactions     Penicillins Nausea and Vomiting     Shrimp Nausea and Vomiting       Physical Examination:  /53 (BP Location: Left arm)  Pulse 86  Temp 98.1  F (36.7  C) (Oral)  Resp 18  Ht 1.727 m (5' 8\")  Wt 78.5 kg (173 lb 1 oz)  SpO2 99%  BMI 26.31 kg/m2  Alert, oriented - follows simple command, not aphasic, dysarthric. No gaze abnormality, PERRLA. No facial asymmetry noted. Vision diminished bilateral peripheral fields + right homonymous hemianopsia noted. Moves all four extremities equally - not symmetric. Localizes touch equally.       Labs/Studies:  Recent Labs   Lab Test  10/12/17   0738  10/11/17   1608  10/11/17   0806  10/10/17   0703   NA  136   --   137  136   POTASSIUM  3.8  4.2  4.0  3.3*   CHLORIDE  107   --   108  106   CO2  20   --   22  22   ANIONGAP  10   --   7  9   GLC  116*   --   127*  119*   BUN  29   --   28  28   CR  2.21*   --   2.38*  2.49*   OKSANA  8.5   --   8.5  8.6   WBC  9.8   --   13.3*  16.3*   RBC  3.04*   --   3.31*  3.34*   HGB  8.5*   --   9.3*  9.4*   PLT  182   --   159  153       Recent Labs   Lab Test  10/06/17   2323   INR  1.14   PTT  38*           Recent Labs  Lab 10/08/17  0827 10/07/17  1705 10/07/17  1559 10/07/17  1515   PH 7.37 7.16* 7.16* 7.24*   PCO2 32* 60* 61* 47*   PO2 66* 85 122* 89   HCO3 19* 21 22 20*   O2PER 21.0 60% 60 60           Imaging:  Reviewed    Assessment/Plan    Patient has multiple acute multiple posterior circulation infarcts - it could be attributed to endovascular procedure, however, " patient's aortic arch has significant atherosclerosis and given her vascular risk factors she could have had spontaneous events, cardiac etiology also should be considered.    - Consider ASA and high dose statin for stroke prophylaxis  - Consider TTE to rule out cardiac source of emboli    We will follow echo results.    Plan discussed with Dr. Jluis Naranjo  Fellow

## 2017-10-12 NOTE — PLAN OF CARE
Problem: Patient Care Overview  Goal: Plan of Care/Patient Progress Review  OT-7B: Cancel. Pt working with PT in PM. Will initiate 10/13.

## 2017-10-12 NOTE — PLAN OF CARE
Problem: Patient Care Overview  Goal: Plan of Care/Patient Progress Review  Outcome: No Change  Vitals:     10/12/17 0744 10/12/17 1100 10/12/17 1114 10/12/17 1535   BP: 129/73 116/73 122/56 105/53   BP Location: Right arm Right arm Left arm Left arm   Pulse:     86     Resp: 26   26 18   Temp: 97.3  F (36.3  C)   98.6  F (37  C) 98.1  F (36.7  C)   TempSrc:       Oral   SpO2: 97% 94%   99%   Weight:           Height:           AVSS. Alert and oriented x 4 today. Using call light appropriately.  OOB x 2. Sat in chair twice for 30min and one hour in the afternoon. Seen by PT. Was able to use walker and walk within her room. Poor po intake. MIV at 100cc per hr. Son at bed side now and ordering dinner. On calorie counts. No c/o pain. C/o dizziness with movement this afternoon. Pt currently resting in bed. Continue with current cares.

## 2017-10-12 NOTE — PROGRESS NOTES
"Social Work Services Progress Note    Hospital Day: 7  Date of Initial Social Work Evaluation:  10/10/17- please see for details  Collaborated with:  Chart review, team rounds, Vascular team, pt, nursing facilities    Data:  Pt is being followed for potential TCU placement as well as support as pt is having a difficult time adjusting to her current situation.  Received update from Vascular team that testing has shown that pt has had multiple small strokes and that the Neuro team will be seeing pt today but pt is aware of having had these strokes.   Vascular team is unsure of pt's exact d/c date.     Intervention:  Sw met with pt in pt's room to check in, provide education around ARU related to stroke, and provide stroke related resources after new diagnosis of stroke.     Pt reported not feeling well today and was holding an emesis bag during our visit. Pt commented feeling that she is \"crazy\" and talked about how every time she turns around something else is going wrong. Pt reported that she was told by Dr. Casillas that she had multiple small strokes and said she has never had a stroke before. Pt reported feeling the effects of the stroke because of the things she has been doing, but did not elaborate.   Pt was alert, oriented, and appropriately engaged and able to recall recent cares and tests that were done.     Sw brought up comment that pt made yesterday morning about how it would be ok if she fell over dead and pt reported \"it would be\". Sw discussed goals of care with pt and attempted to determine what route pt would like to take with cares. Pt initially reported she doesn't want to do rehab to get strength and mobility back and wants to live with one of her children. After ingrid discussed how rehab, whether TCU or ARU (with explained differences including stroke specific care at ARU and that ARU may be recommended by the Neuro team), can help prevent or minimize effects of stroke pt then reported she does think " "she wants to pursue short term rehab but is unsure if she would want ARU or TCU and wants to see how she does with PT/OT to determine this. Sw left list of ARU options with pt for reference. Sw also discussed how pt working with PT/OT here would be needed to determine a safe d/c plan for pt. Sw discussed the option of LTC if pt is not wanting to do short term rehab but is unsafe to return home with one of her children or to another assisted living and to this pt reported \"I will not go to a nursing home\". Pt is agreeable to having referrals to [facility] [place] and [facility] [place] pursued for TCU but does not want to pursue [facility] in [place] as this is where her niece/granddaughter who put her in [facility] lives.     Sw discussed [facility] [place] and [place] [employer] resources and pt requested that this be discussed further when one of her children are present.     Sw provided pt with both versions of [employer] and pt still expressed interest in completing this. Sw also explained process needed for form to be a legal document.     Sw provided emotional support, supportive listening and validation throughout visit with pt.     Carlos spoke with [facility] liaison (p. [phone], has access to Epic) and provided update. She will continue to follow pt via Epic and would like updates as they are received.   Sw called [facility] [place] (p. [phone])- left vm for admissions with update, PT/OT information will be faxed when entered, waiting to hear back from facility.     Assessment:  See bedside RN, PT/OT, medical team notes    Plan:    Anticipated Disposition:  TBD, awaiting PT/OT evals and recs    Barriers to d/c plan:  Medical readiness, determination of d/c needs    Follow Up:  TBD, sw will continue to follow for d/c planning and support    SEE Anton, MSW  7B   [phone] (pager) [phone]  10/12/2017          "

## 2017-10-12 NOTE — PROGRESS NOTES
VASCULAR SURGERY PROGRESS NOTE    HPI:    Sandy Sheffield is an 84 year old female with past medical history of COPD and cardiac disease who was discovered to have a 9 cm abdominal aortic aneurysm during a presentation of pain of unclear etiology.  A non-contrast CT showed that she did not have adequate neck for standard endovascular repair, after careful consideration, it was felt that she warranted an attempt at repair rather than palliation and that open surgery would not be survivable for her.  Dr. Casillas considered her for an endovascular repair using attempts to preserve the renal arteries but recognizing that she may need to go on dialysis if this failed, which she was comfortable with.  On 10/7/2017, she underwent endovascular repair of abdominal aortic aneurysm and left renal stent placement with Dr. Casillas.    SUBJECTIVE:  Laying in bed, denies pain at present.  Dizziness persists with movement.     OBJECTIVE:  Vital signs:  Temp: 97.3  F (36.3  C) Temp src: Oral BP: 129/73   Heart Rate: 90 Resp: 26 SpO2: 97 % O2 Device: Nasal cannula Oxygen Delivery: 2 LPM      Intake/Output Summary (Last 24 hours) at 10/09/17 1147  Last data filed at 10/09/17 1100   Gross per 24 hour   Intake          2264.17 ml   Output              275 ml   Net          1989.17 ml       Vitals:    10/07/17 1329 10/08/17 0400   Weight: 79.4 kg (175 lb) 78.5 kg (173 lb 1 oz)       PHYSICAL EXAM:  NEURO/PSYCH: The patient is alert and oriented.  Appropriate.  Moves all extremities. Strength equal, face symmetrical     SKIN: Color appropriate for race, warm, dry.  PULMONARY: non-labored breathing, not requiring supplemental oxygen   EXTREMITIES: left arm incision C/D/I. Bilateral groin puncture sites without obvious hematoma, lower extremities warm and well perfused. Palpable bilateral PT and DP pulses     BMP    Recent Labs  Lab 10/12/17  0738 10/11/17  1608 10/11/17  0806 10/10/17  0703 10/09/17  1235 10/08/17  0251     --  137 136  136 137   POTASSIUM 3.8 4.2 4.0 3.3* 3.7 3.6   CHLORIDE 107  --  108 106 106 108   CO2 20  --  22 22 23 19*   BUN 29  --  28 28 27 27   CR 2.21*  --  2.38* 2.49* 2.54* 2.27*   *  --  127* 119* 127* 115*   MAG  --   --   --   --   --  2.1   PHOS  --   --   --   --   --  3.3     CBC    Recent Labs  Lab 10/12/17  0738 10/11/17  0806 10/10/17  0703 10/08/17  0251   WBC 9.8 13.3* 16.3* 10.8   HGB 8.5* 9.3* 9.4* 9.9*    159 153 151  151     INR/PTT    Recent Labs  Lab 10/06/17  2323   INR 1.14   PTT 38*     ABG    Recent Labs  Lab 10/08/17  0827 10/07/17  1705 10/07/17  1559 10/07/17  1515   PH 7.37 7.16* 7.16* 7.24*   PCO2 32* 60* 61* 47*   PO2 66* 85 122* 89   HCO3 19* 21 22 20*     IMAGING:   CTA ANGIOGRAM HEAD NECK 10/12/2017 2:18 AM    Impression:    1. Acute-subacute infarctions involving the occipital lobes  bilaterally, left greater than right, and bilateral cerebellar  hemispheres, similar to MR brain 10/11/2017.  There is expected  edematous evolution of these infarcts with minimal mass effect on the  adjacent parenchyma.  No hemorrhagic transformation.  2. Neck CTA demonstrates nonocclusive filling defect in the left  vertebral artery at its origin. This could be an embolus or a  noncalcified plaque. There is normal filling of the left vertebral  artery distally, without dissection or stenosis.  The right vertebral  artery and internal carotid arteries are within normal limits.   3. Extensive atherosclerotic irregularities along the aortic arch and  involving the left paraclinoid internal carotid artery without  hemodynamically  significant stenosis.   4. Remainder of the major intracranial vessels are patent, without  focal stenosis or aneurysm.      MR BRAIN, COW, CAROTID W/O CONTRAST 10/11/2017 7:16 PM  Impression:   1. Abnormal diffusion restriction in the left greater than right  occipital lobes and bilateral inferior cerebellar hemispheres  consistent with acute infarction. This may be  embolic; however, the  anterior inferior cerebellar arteries appear to supply the posterior  inferior cerebellar artery territories bilaterally and this could be  related to atherosclerotic plaque rupture versus dissection in one of  the vertebral arteries. No definite dissection or atherosclerotic  plaque is identified in the partially visualized vertebral arteries.  The neck MRA is limited secondary to lack of IV contrast.  2. Head MRA demonstrates focal narrowing of the P2 segment of the left  posterior cerebral artery with a possible cut off in the P3 segment.  3. Noncontrast neck MRI demonstrates patent major cervical arteries  which are only partially visualized.  4. Mild diffuse cerebral volume loss and nonspecific subcortical and  periventricular white matter changes likely representing chronic small  vessel ischemic disease.      ASSESSMENT/PLAN:  #1 abdominal aortic aneurysm 9 cm  - status post endovascular repair of abdominal aortic aneurysm and left renal stent placement with Dr. Casillas on 10/7/2017  - continue to monitor creatinine and urine output  - advance diet as tolerated  - PT and OT consults ordered, evaluations pending, not performed on 10/10 due to chest wall pain, encouraged patient to participate with therapies  - spoke with  regarding discharge planning and  probable need for TCU placement  - scopolamine patch applied on 10/10/2017    -  MRI of the brain done 10/11/2017 demonstrated recent cerebellar and occipital infarcts  - patient will require neuro rehab  - consult neurology, appreciate recommendations  - stroke team consulted    - orthostatic blood pressures  - discontinue narcotics and use lidocaine patches for discomfort  - CXR done 10/11/2017 was negative for rib fractures  - will check with Dr. Casillas regarding timing of restarting heparin     #2 Severe malnutrition  - weight loss > 7.5% in 3 months  - mild-moderate muscle loss  - poor po intake, nausea  - continue  calorie counts   - encourage po intact, consume at least 75% of meals TID   - continue magic cups between meals    Please do not hesitate to contact Dr. Casillas's vascular surgery team with any questions.       Jessica ELDER, CNS  Division of Vascular Surgery  [facility]  Pager [phone]

## 2017-10-13 NOTE — PLAN OF CARE
Problem: Patient Care Overview  Goal: Plan of Care/Patient Progress Review  Outcome: Improving  Vitals:     10/12/17 2202 10/13/17 0723 10/13/17 1141 10/13/17 1405   BP: 133/67 117/65 103/64 140/68   BP Location: Left arm Left arm   Left arm   Pulse:           Resp: 18 18 18 18   Temp: 98.3  F (36.8  C) 98.1  F (36.7  C) 97.6  F (36.4  C) 97.4  F (36.3  C)   TempSrc: Oral Oral Oral Oral   SpO2: 92% 94% 94% 97%   Weight:           Height:           AVSS. No c/o pain or nausea. Better po intake with encouragements today. Working with PT. Walked out side of room and up in  Chair x1. Needs assist of 1-2 with walker. Still c/o of right chest wall pain below right breast. Lido patch in use. Also intermittent c/o nausea with activity. Daughter Mary from [place] called x 2 and talker to pt and writer. Stable. Continue with current cares.

## 2017-10-13 NOTE — PLAN OF CARE
Problem: Patient Care Overview  Goal: Plan of Care/Patient Progress Review  Discharge Planner OT   Patient plan for discharge: TCU  Current status: Evaluation completed and treatment initiated. Pt required Min A and Max vc's for transfer supine<->seated EOB and sit<->standing pivot to bedside commode. Pt engaged in self cares with setup, vc's and Min A for thoroughness seated EOB. Pt declined standing activity. Pt easily agitated at times but generally pleasant. Pt requires much encouragement.    Barriers to return to prior living situation: Decreased independence and safety with functional transfers and ADLs. Decreased strength and activity tolerance, impaired cognition.   Recommendations for discharge: TCU  Rationale for recommendations: Pt would benefit from continued therapy to address the above barriers.        Entered by: Stalin Barker 10/13/2017 1:57 PM

## 2017-10-13 NOTE — PROGRESS NOTES
"Social Work Services Progress Note    Hospital Day: 8  Date of Initial Social Work Evaluation:  10/10/17- please see for details  Collaborated with:  Chart review, team rounds, Dr. Casillas, pt's son Nahid, pt, Livier at [facility]    Data:  Pt is being followed for rehab placement and has a new stroke diagnosis. As of yesterday pt was agreeable to short term rehab and was ok with referrals being pursued with [facility] and [facility] [place], but pt did not want to pursue rehab at [facility] or anywhere in [place].     Received vm from [facility] liaisonQian (p. [phone]) reporting that she has sent PT/OT notes to [facility] and that pt should be appropriate for the facility so family is welcome to tour if they are interested.     Received update from Dr. Casillas that he will be ordering PM&R for pt to determine ARU vs TCU, that pt is medically ready to d/c, and that pt's children are aware of pt's new stroke diagnosis.    Per conversation with Vascular team pt is deemed to be her own decision maker, although she has occasional confusion. Team plans to continue talking/reiterating pt's need for rehab at d/c and discussing with her what her goals are. Vascular team reported being told by PM&R that pt will be seen first thing Monday morning.     Intervention:  Carlos met with pt, pt's son Nahid, Dr. Casillas, and pt's dtr Mary via phone in pt's room. Dr. Casillas discussed pt's new diagnosis of stroke and his recommendation that pt pursue some type of rehab upon d/c from the hospital prior to going to live with any of her children so pt can better recover from her stroke and be safe at home with one of her children. Pt nodded her head to this but had previously in conversation told Nahid that she would not go to a \"nursing home\" when he used these words. Carlos later spoke with Nahid in the sun room (pt was busy with nursing staff) and discussed that the first step is having PM&R see pt to provide rehab " "recommendations for pt. Sw then explained that ARU or TCU would be pursued and explained the difference between the 2 types of rehab. Sw explained that regardless of the rehab setting the rehab facility would need to know what's pt's d/c plan would be from the rehab. Nahid discussed how he would be the only child that pt could live with but went on to say that he recently moved from [place] so does not currently have housing so would need to secure housing before his mom could live with him after rehab. Sw informed Nahid of ARU list left in pt's room.     Nahid discussed wanting to be present with pt at rehab to encourage/motivate her and explain things and feels that otherwise pt may say \"no\" come time for therapies. Nahid discussed presenting to pt that the other option for her is to return to [facility], where pt very much does not want to be. Sw explained LTC option in case pt is not able to progress enough with therapies to safely d/c to a child's home or [facility] and sw explained that MA is the only insurance that will cover this expense. Upon further discussion and a phone call to Livier with [facility] it was discovered that pt has active MA as well as an elderly waiver. Nahid reported having gotten a call from a [facility] at [phone] wanting an update on pt.     Cralos explained to Nahid that pt reported wanting Juan Manuel to be her main contact and that as long as pt is her own decision maker we have to follow her wishes. To this Nahid expressed understanding and reported that Juan Manuel does not want to be pt's main contact and that he has tried explaining this to his mom. Nahid went on to explain that pt's oldest child, Baltazar, is bed ridden in [place] and not appropriate for this role. Second oldest would be Edi, who Nahid reported is not involved in pt's life. Mary would be the third oldest and is involved. Nahid discussed how pt is having a difficult time understanding things right now and sw encouraged him to " review things with his mom in terms of d/c options and what she wants and also encouraged him to talk with his siblings about next steps and options.     Sw provided and explained to Nahid [place] [facility] [place] resource information.     Sw called [employer] and it was confirmed that pt does have active MA.     Sw called Qian with [place] and left vm and provided update as to PM&R consult.     Sw called pt's son Juan Manuel ([phone])- left vm asking for return call.     Assessment:  See bedside RN, PT/OT, medical team notes    Plan:    Anticipated Disposition:  TBD, awaiting PM&R eval and recs    Barriers to d/c plan:  Determination of d/c needs, bed availability/acceptance    Follow Up:  TBD, sw will continue to follow for d/c planning    SEE Anton, MSW  7B   [phone] (pager) [phone]  10/13/2017

## 2017-10-13 NOTE — PROGRESS NOTES
10/13/17 1100   Quick Adds   Type of Visit Initial Occupational Therapy Evaluation   Living Environment   Lives With facility resident   Living Arrangements group home   Home Accessibility no concerns   Number of Stairs to Enter Home 0   Number of Stairs Within Home 0   Transportation Available family or friend will provide   Self-Care   Dominant Hand right   Usual Activity Tolerance moderate   Current Activity Tolerance fair   Regular Exercise no   Equipment Currently Used at Home grab bar;shower chair;walker, rolling   Functional Level Prior   Ambulation 1-->assistive equipment   Transferring 0-->independent   Toileting 0-->independent   Bathing 0-->independent   Dressing 0-->independent   Eating 0-->independent   Communication 0-->understands/communicates without difficulty   Swallowing 0-->swallows foods/liquids without difficulty   Cognition 1 - attention or memory deficits   Fall history within last six months no   Which of the above functional risks had a recent onset or change? ambulation;transferring   General Information   Onset of Illness/Injury or Date of Surgery - Date 10/06/17   Referring Physician Jessica Moya APRN CNS   Patient/Family Goals Statement Pt would like to improve functioning and be as independent  as possible.    Additional Occupational Profile Info/Pertinent History of Current Problem Sandy Sheffield is a 84 year old female with PMH significant for DMII, HTN, CAD who presented from an OSH with expanding 9cm abdominal aortic aneurysm. Endovascular repair done on 10/7 without reported complications. Post-surgery patient had new onset dizziness, blurry vision, headache and neck pain. General neurology consulted on 10/11 for further evaluation. MRI showed abnormal diffusion restriction in the left greater than right occipital lobes and bilateral inferior cerebellar hemispheres consistent with acute infarction.   Precautions/Limitations fall precautions   Weight-Bearing Status - LUE  full weight-bearing   Weight-Bearing Status - RUE full weight-bearing   Weight-Bearing Status - LLE full weight-bearing   Weight-Bearing Status - RLE full weight-bearing   Cognitive Status Examination   Orientation person   Level of Consciousness alert   Able to Follow Commands moderate impairment   Personal Safety (Cognitive) moderate impairment;at risk behaviors demonstrated   Visual Perception   Visual Perception Comments Pt reports blurry vision post surgically. Pt able to read clock without difficulty at 15ft.    Sensory Examination   Sensory Comments Pt able to feel light touch and pressure throughout BUE's /BLE's.    Pain Assessment   Patient Currently in Pain No   Range of Motion (ROM)   ROM Comment BUE AROM WFL.    Strength   Strength Comments BUE strength grossly 4/5 MMT. Pt demonstrates a mild impairment in  strength.    Mobility   Bed Mobility Comments Min A and max vc's.    Transfer Skills   Transfer Comments Min A, max vc's and 4WW.    Activities of Daily Living Analysis   Impairments Contributing to Impaired Activities of Daily Living balance impaired;cognition impaired;fear and anxiety;flexibility decreased;pain;post surgical precautions;strength decreased   General Therapy Interventions   Planned Therapy Interventions ADL retraining;IADL retraining;bed mobility training;cognition;ROM;strengthening;stretching;transfer training;visual perception;home program guidelines;progressive activity/exercise   Clinical Impression   Criteria for Skilled Therapeutic Interventions Met yes, treatment indicated   OT Diagnosis Decreased independence and safety with functional transfers and ADLs.    Influenced by the following impairments decreased strength, activity tolerance and impaired cognition.    Assessment of Occupational Performance 3-5 Performance Deficits   Identified Performance Deficits Decreased independence and safety with functional transfers and ADLs. Decreased activity tolerance.    Clinical  "Decision Making (Complexity) Low complexity   Therapy Frequency 5 times/wk   Predicted Duration of Therapy Intervention (days/wks) 10/19/2017   Anticipated Discharge Disposition Transitional Care Facility   Risks and Benefits of Treatment have been explained. Yes   Patient, Family & other staff in agreement with plan of care Yes   [facility] TM \"6 Clicks\"   2016, Trustees of [facility], under license to [employer].  All rights reserved.   6 Clicks Short Forms Daily Activity Inpatient Short Form   [facility]  \"6 Clicks\" Daily Activity Inpatient Short Form   1. Putting on and taking off regular lower body clothing? 2 - A Lot   2. Bathing (including washing, rinsing, drying)? 2 - A Lot   3. Toileting, which includes using toilet, bedpan or urinal? 2 - A Lot   4. Putting on and taking off regular upper body clothing? 3 - A Little   5. Taking care of personal grooming such as brushing teeth? 3 - A Little   6. Eating meals? 4 - None   Daily Activity Raw Score (Score out of 24.Lower scores equate to lower levels of function) 16   Total Evaluation Time   Total Evaluation Time (Minutes) 10     "

## 2017-10-13 NOTE — PLAN OF CARE
Problem: Patient Care Overview  Goal: Plan of Care/Patient Progress Review  PT / 7A     Discharge Planner PT   Patient plan for discharge: pt does not know  Current status: Performed bed mobility + SBA, sit->stand + CGA, amb ~ 60' with 4WW + CGA demonstrating gait instability and pathway deviation.  Barriers to return to prior living situation: Level of assist, safety, dizziness  Recommendations for discharge: TCU  Rationale for recommendations: Pt would benefit from continued skilled PT/OT to progress functional strength and balance to improve independence with functional mobility and ADLs       Entered by: Jazmin Pierce 10/13/2017 2:27 PM

## 2017-10-13 NOTE — PROGRESS NOTES
"Vascular Surgery Progress Note    Patient seen and evaluated at the bedside. Continues to complain of dizziness, intermittent nausea.     /63 (BP Location: Right arm)  Pulse 86  Temp 97.2  F (36.2  C) (Oral)  Resp 18  Ht 5' 8\"  Wt 180 lb 8 oz  SpO2 94%  BMI 27.44 kg/m2    Intake/Output Summary (Last 24 hours) at 10/13/17 1543  Last data filed at 10/13/17 1200   Gross per 24 hour   Intake          2061.67 ml   Output              700 ml   Net          1361.67 ml     General: Elderly female resting supine NAD   Cor:RRR  Pulm: unlabored breathing  Abd: S/NT/ND  LE: Warm and well perfused bilaterally   Neuro: CN II-XII intact no gross motor deficits.    Assessment: Mrs. Sheffield is a 85 yo female w/ a history significant for DM II, HTN, CAD s/p EVAR with left renal artery snorkel for a 9cm Abdominal Aortic aneurysm. Postoperative care significant for posterior circulation stroke.     Plan:     1. Neurology recs appreciated. Patient started on Asa and Statin. TTE has been ordered. Etiology of stroke likely secondary to left vertebral.   2. PM&R consult for Stroke Rehab   3. Continue PT/OT  4. Carotid Duplex      Dhaval Carcamo MD   Vascular Surgery Fellow  Pager: [phone]        "

## 2017-10-13 NOTE — PLAN OF CARE
"Problem: Patient Care Overview  Goal: Plan of Care/Patient Progress Review  Outcome: Improving  Vitals:     10/13/17 1141 10/13/17 1405 10/13/17 1523 10/13/17 1724   BP: 103/64 140/68 122/63     BP Location:   Left arm Right arm     Pulse:           Resp: 18 18 18     Temp: 97.6  F (36.4  C) 97.4  F (36.3  C) 97.2  F (36.2  C)     TempSrc: Oral Oral Oral     SpO2: 94% 97% 94%     Weight:       83.4 kg (183 lb 14.4 oz)   Height:           AVSS. SOB with activity. Weak productive cough. C/o intermittent chest wall pain mainly on her right lower rib area. Lido patch in use. Reports poor appetite. No c/o nauseas. Had 2 bottles of ensure today at breakfast and lunch time plus x 2 cups of fresh fruit. Likes strawberry flavored ensure. Declined all other meals. Third ensure ordered for later tonight. OOB to chair with Pt. Was able to walk with PT in early afternoon. More clear mentally. Less c/o dizziness with movement. Less c/o inability to see. She was able to see pills and differentiate color of pills. \" What is the pink pill for?\" Needs constant verbal cues during tranfers bed to BSC or bed to chair and visversa. Unsure which way to turn to get to destination. Easily frustrated with instructions. Family at bed side this afternoon. New piv in RUE. Plan for PHANI and a bilateral carotid US. Continue with current cares.      "

## 2017-10-14 NOTE — PROGRESS NOTES
"Vascular Surgery Progress Note    Patient seen and evaluated at the bedside. No complaints overnight. Claims she slept well. Claims dizziness and nausea resolving.     /47 (BP Location: Right arm)  Pulse 86  Temp 97  F (36.1  C) (Oral)  Resp 18  Ht 5' 8\"  Wt 183 lb 14.4 oz  SpO2 98%  BMI 27.96 kg/m2    Intake/Output Summary (Last 24 hours) at 10/14/17 0727  Last data filed at 10/14/17 0700   Gross per 24 hour   Intake          1781.67 ml   Output             1325 ml   Net           456.67 ml     General: Elderly female resting supine NAD   Cor:RRR  Pulm: unlabored breathing  Abd: S/NT/ND  LE: Warm and well perfused bilaterally. UE intact. Bruising and ecchymosis observed to the left forearm.   Neuro: CN II-XII intact no gross motor deficits.     Assessment: Mrs. Sheffield is a 83 yo female w/ a history significant for DM II, HTN, CAD s/p EVAR with left renal artery snorkel for a 9cm Abdominal Aortic aneurysm. Postoperative care significant for posterior circulation stroke.      Plan:      1. Will follow up Echo and carotid duplex   2. PM& R evaluate to be performed Monday morning.   3. Would appreciated social work Dispo planning in preparation for TCU. Patient is more agreeable to transition care on discussion this morning. Adane PT/OT      Dhaval Carcamo MD   Vascular Surgery Fellow  Pager: [phone]  "

## 2017-10-14 NOTE — PLAN OF CARE
Problem: Patient Care Overview  Goal: Plan of Care/Patient Progress Review  Patient is confuse, disoriented to time and situation, able to use call light. On O2 at 2 L/nc, maintaining >92%. HOB elevated. Diminished LS. +swollen in mathieu upper arms, purplish in mathieu toes but +CMS. Groin incision cdi. +BS, no BM, +nausea; Zofran po given with relief. Voiding adequate amount, with some incontinence, pull ups on. Uses bed pan and commode, assist of 1. BG at 0200- 142. PIV cath was noted off from pt, pt not aware what happened. Bed alarm activated for safety. Continue poc.

## 2017-10-14 NOTE — PLAN OF CARE
"Problem: Patient Care Overview  Goal: Plan of Care/Patient Progress Review  Outcome: No Change  /60 (BP Location: Right arm)  Pulse 86  Temp 97.8  F (36.6  C) (Oral)  Resp 18  Ht 1.727 m (5' 8\")  Wt 83.4 kg (183 lb 14.4 oz)  SpO2 97%  BMI 27.96 kg/m2     Afebrile, VSS.  Pt had US bilat carotids and PHANI bubble study completed today, results awaiting interpretation.  Neuro rounded at bedside in AM and were updated that tests were completed.  Pt has fewer c/o right-sided pain and dizziness since use of lidocaine patch and scopolamine patch.  Continues to have poor appetite, she was fed by nursing staff for lunch to encourage intake and was noted to cough intermittently after drinking.  After noting swallow study had not been completed since stroke dx, updated team and swallow study was ordered.  LS dim with wet wheezes.  IS use = 500, continue to encourage use.  Does not have PIV access, she lost two overnight and another this morning, and did not pursue new access as maintenance fluids were discontinued.  Plan to await swallow study.      "

## 2017-10-14 NOTE — PROGRESS NOTES
"SPIRITUAL HEALTH SERVICES  SPIRITUAL ASSESSMENT Progress Note  [facility] 7B     PRIMARY FOCUS:     Emotional/spiritual/Jehovah's witness distress    Support for coping    REFERRAL SOURCE: Follow-up of on call's previous visit.    ILLNESS CIRCUMSTANCES:   Reviewed documentation. Reflective conversation shared with Sandy which integrated elements of illness and family narratives.     Context of Serious Illness/Symptom(s) - \"Life has been down-hill since July 7th.\" In July, Sandy and her youngest son lost their home. Sandy's illness was situated in the context of the resulting ambiguity with regard to living situation.    Resources for Support - Sandy named God as a resource of support, \"He is everything to me.\" Sandy is Roman Catholic, and finds prayer a resource for coping with the ambiguity of her present circumstances.    DISTRESS:     Emotional/Spiritual/Existential Distress - Sandy names the ambiguity of her living situation as a primary source of distress. She would prefer to live with one of her children, but many circumstances  the way of this.    SPIRITUAL/Muslim COPING:     Holiness/Macey - Sandy's Roman Catholic macey is a strong source of coping.    Spiritual Practice(s) - Sandy values prayer, and asked for prayer \"the Our Father,\" in our visit. Prayer was shared.    Emotional/Relational/Existential Connections - Sandy uses humor - often self-deprecating to deflect the difficulty of her circumstances.    GOALS OF CARE:    Goals of Care - Emotional and prayer support as Sandy processes her circumstances.    Meaning/Sense-Making - \"Things have been going down hill since July 7.\" Sandy sees her illness in the context of a series of difficulties in her life.    PLAN: I will follow up once per week for prayer and emotional support as Sandy remains on 7B.    Tunde Ovalle   Intern  Pager [phone]    "

## 2017-10-14 NOTE — PLAN OF CARE
"Problem: Patient Care Overview  Goal: Plan of Care/Patient Progress Review  ST 7B: Orders received for swallow evaluation. Attempted to complete exam, however, Pt declining participation despite encouragement. Pt reports no swallowing difficulties and stated \"I don't want to eat anything right now\" and \"I want to rest\". Pt agreeable to ST returning tomorrow AM to complete assessment.      "

## 2017-10-14 NOTE — PLAN OF CARE
Problem: Patient Care Overview  Goal: Plan of Care/Patient Progress Review  Outcome: Improving  Vitals:     10/13/17 1724 10/13/17 2337 10/14/17 0818 10/14/17 1616   BP:   101/47 114/60 120/65   BP Location:   Right arm Right arm Right arm   Pulse:           Resp:   18 18 18   Temp:   97  F (36.1  C) 97.8  F (36.6  C) 98.9  F (37.2  C)   TempSrc:   Oral Oral Oral   SpO2:   98% 97% 96%   Weight: 83.4 kg (183 lb 14.4 oz)     86.1 kg (189 lb 14.4 oz)   Height:           AVSS. No c/o pain or nausea. Poor appetite. Noted some coughing with oral intake. Swallow evaluation ordered. Pt declined test this afternoon. Will re try tomorrow. Needs assist of one and encoouragements to eat and drink. Had x 2 ensures through the day and working on 3rd. Had a 1/4 of a magic cup and fruit cup. Voiding using bed pan. Declined to be OOB today. Has being busy with US and PHANI. No family was in today. Pt more conversive and compliant with most cares. CT of abdomin ordered for tomorrow. Pt is stable. Continue with current cares.

## 2017-10-15 NOTE — PROGRESS NOTES
"Vascular Surgery Progress Note     Patient seen and evaluated at the bedside this morning. Complaining of nausea. Swallow evaluation yesterday. Poor appetite.     /63 (BP Location: Left arm)  Pulse 86  Temp 98.2  F (36.8  C) (Oral)  Resp 18  Ht 5' 8\"  Wt 189 lb 14.4 oz  SpO2 93%  BMI 28.87 kg/m2    Intake/Output Summary (Last 24 hours) at 10/15/17 1346  Last data filed at 10/15/17 1000   Gross per 24 hour   Intake              917 ml   Output              950 ml   Net              -33 ml     General: Elderly female resting supine NAD   Cor:RRR  Pulm: unlabored breathing  Abd: S/NT/ND. Well healed Supraumbilical incision with hernia.  LE: Warm and well perfused bilaterally. UE intact. Bruising and ecchymosis observed to the left forearm.   Neuro: CN II-XII intact no gross motor deficits.      Assessment: Mrs. Sheffield is a 85 yo female w/ a history significant for DM II, HTN, CAD s/p EVAR with left renal artery snorkel for a 9cm Abdominal Aortic aneurysm. Postoperative care significant for posterior circulation stroke.       Plan:       1. Echo and Carotid Duplex unremarkable for significant disease. Etiology of CVA likely secondary to vert.   2. Asa and Statin have been initiated.  3. Continue PT. OOB to chair. Ambulate as tolerated.    4. ? Dispo to [place] pending PM& R consult.   5. Patient is cleared for discharge from Vascular Standpoint.     Dhaval Carcamo MD   Vascular Surgery Fellow  Pager: [phone]    "

## 2017-10-15 NOTE — PLAN OF CARE
Problem: Patient Care Overview  Goal: Plan of Care/Patient Progress Review  Discharge Planner OT   Patient plan for discharge: TCU  Current status: Pt sat EOB x10 minutes w/CGA. STSx2: MIN A. Pt reported feeling dizzy during activity.  Barriers to return to prior living situation: decreased strength, limited tolerance for activity, decreased independence w/ADLs  Recommendations for discharge: TCU  Rationale for recommendations: pt below reported baseline functioning       Entered by: Tawny Pereira 10/15/2017 3:33 PM

## 2017-10-15 NOTE — PLAN OF CARE
Problem: Surgery Nonspecified (Adult)  Goal: Signs and Symptoms of Listed Potential Problems Will be Absent, Minimized or Managed (Surgery Nonspecified)  Signs and symptoms of listed potential problems will be absent, minimized or managed by discharge/transition of care (reference Surgery Nonspecified (Adult) CPG).  Outcome: No Change  RN - Vitals stable. Pt c/o generalized pain - declining need for intervention. Intermittent confusion remains. Pt continues to report ongoing tiredness and difficulties with vision. Refusing to get OOB - using bedpan for voiding. No bowel movement noted today. Ordered abdominal CT this AM cancelled. Abdomen remains soft round with notable bowel sounds. Pt tolerating PO intake with fair appetite. Incisions/CMS/pulses present. Pt anticipating PM&R eval tomorrow with hopeful discharge to TCU in near future.

## 2017-10-15 NOTE — PLAN OF CARE
Problem: Patient Care Overview  Goal: Plan of Care/Patient Progress Review  Discharge Planner SLP   Patient plan for discharge: unknown  Current status: Clinical swallow evaluation completed per MD order. Pt presents with functional oral-phrayngeal swallow on exam. Mastication/bolus manipulation prolonged due to lack of lower dentition, but adequate. No s/sx of aspiraiton observed across textures. Recommend continue regular diet with thin liquids as tolerated. Pt should be repositioned upright for all PO intake, take small bites/sips and alternate consistencies. No ongoing formal ST intervention indicated at this time. will sign off  Barriers to return to prior living situation: will defer to PT/OT; no ongoing ST needs at this time  Recommendations for discharge: TCU  Rationale for recommendations: per PT/OT       Entered by: Awilda Pedraza 10/15/2017 9:15 AM

## 2017-10-15 NOTE — CONSULTS
Neuroscience Intensive Care   10/14/2017    Sandy Sheffield is a 84 year old female with PMH significant for DMII, HTN, CAD who presented from an OSH with expanding 9cm abdominal aortic aneurysm. Endovascular repair done on 10/7 without reported complications. Post-surgery patient had new onset dizziness, blurry vision, headache and neck pain. General neurology consulted on 10/11 for further evaluation. MRI showed abnormal diffusion restriction in the left greater than right occipital lobes and bilateral inferior cerebellar hemispheres consistent with acute infarction.      24 Hour Vital Signs Summary:  Temperatures:  Current - Temp: 98.1  F (36.7  C); Max - Temp  Av.8  F (37.1  C)  Min: 97.3  F (36.3  C)  Max: 99.6  F (37.6  C)  Respiration range: Resp  Av.8  Min: 16  Max: 26  Pulse range: Pulse  Av  Min: 86  Max: 86  Blood pressure range: Systolic (24hrs), Av , Min:105 , Max:137   ; Diastolic (24hrs), Av, Min:53, Max:73    Pulse oximetry range: SpO2  Av.4 %  Min: 88 %  Max: 99 %    Ventilator Settings  Resp: 18      Intake/Output Summary (Last 24 hours) at 10/12/17 1839  Last data filed at 10/12/17 1800   Gross per 24 hour   Intake             2537 ml   Output              975 ml   Net             1562 ml            Current Medications:    aspirin EC  81 mg Oral Daily     rosuvastatin  40 mg Oral Daily     lidocaine  2 patch Transdermal Q24H     lidocaine   Transdermal Q24h     lidocaine   Transdermal Q8H     scopolamine  1 patch Transdermal Q72H    And     scopolamine   Transdermal Q8H    And     scopolamine   Transdermal Q72H     insulin aspart  1-3 Units Subcutaneous TID AC     insulin aspart  1-3 Units Subcutaneous At Bedtime     albuterol  2 puff Inhalation 4x Daily     sodium chloride (PF)  3 mL Intracatheter Q8H       PRN Medications:  potassium chloride, potassium chloride, potassium chloride, potassium chloride with lidocaine, potassium chloride, magnesium sulfate, potassium  "phosphate (KPHOS) in D5W IV, potassium phosphate (KPHOS) in D5W IV, potassium phosphate (KPHOS) in D5W IV, potassium phosphate (KPHOS) in D5W IV, ondansetron **OR** ondansetron, glucose **OR** dextrose **OR** glucagon, melatonin, albuterol, ALPRAZolam, lidocaine (buffered or not buffered), lidocaine 4%, sodium chloride (PF), alum & mag hydroxide-simethicone, prochlorperazine, naloxone    Infusions:       Allergies   Allergen Reactions     Penicillins Nausea and Vomiting     Shrimp Nausea and Vomiting       Physical Examination:  /65 (BP Location: Right arm)  Pulse 86  Temp 98.9  F (37.2  C) (Oral)  Resp 18  Ht 1.727 m (5' 8\")  Wt 86.1 kg (189 lb 14.4 oz)  SpO2 96%  BMI 28.87 kg/m2  Alert, oriented - follows simple command, not aphasic, dysarthric. No gaze abnormality, PERRLA. No facial asymmetry noted. Vision diminished bilateral peripheral fields + right homonymous hemianopsia noted. Moves all four extremities equally - not symmetric. Localizes touch equally.       Labs/Studies:  Recent Labs   Lab Test  10/12/17   0738  10/11/17   1608  10/11/17   0806  10/10/17   0703   NA  136   --   137  136   POTASSIUM  3.8  4.2  4.0  3.3*   CHLORIDE  107   --   108  106   CO2  20   --   22  22   ANIONGAP  10   --   7  9   GLC  116*   --   127*  119*   BUN  29   --   28  28   CR  2.21*   --   2.38*  2.49*   OKSANA  8.5   --   8.5  8.6   WBC  9.8   --   13.3*  16.3*   RBC  3.04*   --   3.31*  3.34*   HGB  8.5*   --   9.3*  9.4*   PLT  182   --   159  153       Recent Labs   Lab Test  10/06/17   2323   INR  1.14   PTT  38*           Recent Labs  Lab 10/08/17  0827   PH 7.37   PCO2 32*   PO2 66*   HCO3 19*   O2PER 21.0           Imaging:  Reviewed    Assessment/Plan    Patient has multiple acute multiple posterior circulation infarcts - it could be attributed to endovascular procedure, however, patient's aortic arch has significant atherosclerosis and given her vascular risk factors she could have had spontaneous events, " cardiac etiology also should be considered.  Echo done: Global and regional left ventricular function is normal with an EF of 55-60%.  Global right ventricular function is normal.  No significant valvular abnormalities were noted.  There was no shunt at the atrial septal level as assessed by color Doppler and  agitated saline bubble study at rest and with Valsalva maneuver.  The inferior vena cava was normal in size with preserved respiratory  variability.  No pericardial effusion is present.    - Consider ASA and high dose statin for stroke prophylaxis  - Follow up with stroke clinic in one month   - we will sign off  Please feel free to contact us if you have any question or concerns       Plan discussed with Dr. Jluis Herrera  Neurocritical care Fellow  Pager [phone]

## 2017-10-15 NOTE — PROGRESS NOTES
10/15/17 0908   General Information   Onset Date 10/06/17   Start of Care Date 10/15/17   Referring Physician Addis Monet MD   Patient Profile Review/OT: Additional Occupational Profile Info See Profile for full history and prior level of function   Patient/Family Goals Statement Pt reports no difficulty swallowing   Swallowing Evaluation Bedside swallow evaluation   Behaviorial Observations WFL (within functional limits)  (Pt cooperative; follows commands)   Mode of current nutrition Oral diet   Type of oral diet Regular;Thin liquid   Respiratory Status Room air   Comments Orders received for swallow evaluation. Pt w/ a history significant for DM II, HTN, CAD s/p EVAR with left renal artery snorkel for a 9cm Abdominal Aortic aneurysm. RN reported coughing with intake of liquids yesterday   Clinical Swallow Evaluation   Oral Musculature generally intact   Structural Abnormalities none present   Dentition upper dentures  (no lower dentition - Pt reports getting dentures @ age 20)   Mucosal Quality good   Mandibular Strength and Mobility intact   Oral Labial Strength and Mobility WFL   Lingual Strength and Mobility WFL   Velar Elevation intact   Buccal Strength and Mobility intact   Laryngeal Function Cough;Throat clear;Swallow;Voicing initiated;Dry swallow palpated   Additional Documentation Yes   Swallow Eval   Feeding Assistance frequent cues/help required   Clinical Swallow Eval: Thin Liquid Texture Trial   Mode of Presentation, Thin Liquids straw;self-fed   Volume of Liquid or Food Presented 8oz   Oral Phase of Swallow WFL   Pharyngeal Phase of Swallow intact  (no overt s/sx of aspiration observed)   Diagnostic Statement swallow appears functional for thin liquids; no overt s/sx of aspiration observed   Clinical Swallow Eval: Solid Food Texture Trial   Mode of Presentation, Solid fed by clinician   Volume of Solid Food Presented bites x2   Oral Phase, Solid WFL  (prolonged due to no lower  dentition, but adequate)   Pharyngeal Phase, Solid intact  (no overt s/s xof aspiraiton observed)   Diagnostic Statement swallow functional for solids on exam   Swallow Compensations   Swallow Compensations No compensations were used   Esophageal Phase of Swallow   Patient reports or presents with symptoms of esophageal dysphagia No   Swallow Eval: Clinical Impressions   Skilled Criteria for Therapy Intervention No problems identified which require skilled intervention   Functional Assessment Scale (FAS) 7   Treatment Diagnosis functional oral-pharyngeal swlalow   Diet texture recommendations Regular diet;Thin liquids   Recommended Feeding/Eating Techniques alternate between small bites and sips of food/liquid;maintain upright posture during/after eating for 30 mins;small sips/bites   Demonstrates Need for Referral to Another Service occupational therapy;physical therapy   Predicted Duration of Therapy Intervention (days/wks) evaluation only   Anticipated Discharge Disposition (will defer to PT/OT)   Risks and Benefits of Treatment have been explained. Yes   Patient, family and/or staff in agreement with Plan of Care Yes   Clinical Impression Comments Clinical swallow evaluation completed per MD order. Pt presents with functional oral-phrayngeal swallow on exam. Mastication/bolus manipulation prolonged due to lack of lower dentition, but adequate. No s/sx of aspiraiton observed across textures. Recommend continue regular diet with thin liquids as tolerated. Pt should be repositioned upright for all PO intake, take small bites/sips and alternate consistencies. No ongoing formal ST intervention indicated at this time. will sign off   Total Evaluation Time   Total Evaluation Time (Minutes) 18

## 2017-10-15 NOTE — PROGRESS NOTES
Social Work Services Progress Note    Hospital Day: 9  Date of Initial Social Work Evaluation: 10/10/17  Collaborated with: pt.'s son, Valerie and Nahid (pt. Was awake initially and SW introduced self but then fell asleep during visit)    Data: Bedside nurse contacted SW that family here and requesting visit. SW following for rehab placement.   Intervention: Met with pt. And sons in pt.'s room. Left message at [facility] rehab intake to place pt. On wait list for ARU. Left message at [facility] ([phone]) that pt. May be ready Tuesday  Assessment: PM&R to assess pt. Monday. Sons aware of this. If she is ARU appropriate, they want her to go to [facility]. If she is TCU, they prefer [place] as it is closer to home. Per sons, no definite d/c date in mind, but they were understanding it was pending the PM&R assessment and so may be as soon as Tuesday.  Plan:    Discharge Plans in Progress: TCU versus ARU (PM&R eval Monday)    Barriers to d/c plan: therapy recs, medical readiness, bed availability    Follow up Plan: SW will await PM&R recs Monday and f/u with appropriate d/c plans  **Per sons, they will want medical transportation arranged  **PAS will need to be completed if pt. Goes to TCU level facility    1114 Update:  Call from Goran in rehab intake - PM&R will likely not be seeing pt. Monday d/t new structure of that team's eval. PT and OT are clear in their recs of TCU. Also, d/t pt.'s age and no secure housing for when d/c from rehab, he states this pt. Is TCU, NOT ARU. Pt. Is NOT on wait list at ARU.    1400 Update: Attempted to meet with son after speaking to rehab intake but they had left. Nurse called SW at this time that son is back. Met with pt. And son - pt. Awake now. SW updated Nahid and pt. That it has been determined that she is TCU level, not ARU level and PM&R will not be assessing her tomorrow. At this time, d/c date still unknown but son still thinks may be in a couple of days. Both pt. And son  were ok with plan for TCU. Pt. Voiced she did not think she could tolerate 3 hours of therapy a day.  SW also left message for [facility] Qian jasso, (this is for the [facility])

## 2017-10-15 NOTE — PLAN OF CARE
Problem: Patient Care Overview  Goal: Plan of Care/Patient Progress Review  [id number]  VSS. Alert. Oriented to person and situation. LS wheezy, diminished. Albuterol scheduled. Cough syrup prn. Tolerating reg diet. Blood sugar 147 before dinner- refused insulin. Pt voiding via bedpan. Has not had BM since 10/9- got order for miralax. Suggested to pt she try sitting on the commode to help have a BM but she refused due to weakness. abd distended. Incision CDI with hernia. Continue to monitor.

## 2017-10-15 NOTE — PLAN OF CARE
Problem: Patient Care Overview  Goal: Plan of Care/Patient Progress Review  Patient has expiratory wheezing mostly in upper lobes; Albuterol puff scheduled and PRN dose given. Maintained on O2 inhalation at 2 L/nc. HOB elevated. DBE advised. Hyperactive BS, denies nausea. Abdomen is soft but tender, +protrusion in umbilical area, with plan to do CT of abd. BG- 180 , no coverage given. BG at 0200-  121. Refused to have yogurt and grapes which was on her table, stated she just wanted to rest and wait for son. Voiding using bed pan. Call light within reach and using it often times to make needs known. Family friend visited not her son as per pt. Incision in mathieu groin and left arm- cdi, still bruised in mathieu arm. No existing PIV; will call Vascular prior to scheduled time of CT scan. Continue poc.

## 2017-10-16 NOTE — PROGRESS NOTES
Social Work Services Progress Note    Hospital Day: 11  Date of Initial Social Work Evaluation:  10/10/17- please see for details  Collaborated with:  Chart review, team rounds, nursing facilities, pt, pt's son Nahid, Vascular team, pt's dtr Mary ([phone]), bedside RN    Data:  Pt is being followed for rehab placement and as of Friday plan was for PM&R to see pt this morning, but upon chart review from the weekend pt will no longer be seen by PM&R and was reviewed by [facility] and deemed to not be ARU appropriate.   Received update from Vascular team that pt is not medically ready to d/c today as pt needs diuresis and a chest xray.   Per bedside RNMartinez, pt would be appropriate to utilize w/c transport at d/c.     Intervention:  Carlos called [facility] ([phone], has access to Epic)- left vm to confirm acceptance and arrival time needed if pt is ready to d/c today, waiting to hear back. Qian called back reporting that [facility] is requesting updated PT/OT information to review before officially accepting, awaiting return call with official decision. Qian called back reporting that pt has been accepted to facility.     Carlos met with pt and pt's son Nahid in pt's room to check in and discuss d/c planning. Pt reported understanding that she is medically ready to d/c today although reported not agreeing and that she does not feeling well this morning, although would not elaborate and said she just feels icky. Pt reported not wanting to endure multiple moves upon d/c and that this does not help her feel safe and secure. Carlos validated this concern and provided emotional support and asked what would be helpful for pt currently and she reported she could not think of anything.   Sw provided update on d/c and that ARU is not being pursued, but TCU instead and that there has been no response from [facility] but [facility] has tentatively accepted pt. Pt reported again not wanting to be  "at a \"nursing home\", although acknowledged she has no other options right now, and sw explained that although pt would be at a nursing home she would be on a separate short term rehab wing or floor with the goal of eventually living with one of her children or getting to another next step. Sw explained that if pt is motivated to not be there that should help her stay be shorter. Pt agreed with this goal of short term rehab with eventually leaving the facility. Sw explained that a transfer to [facility] will be pursued for today and confirmed that pt/family would like medical transport at d/c. Sw explained potential out of pocket expense for this. Nahid plans to contact his sister Mary with this update.     Sw explained to pt and Nahid that if desired at any point they could work with the sw at [facility] to coordinate a transfer to a different facility and that the sw there would be assisting with d/c planning from there.     Attempts to contact pt's son Juan Manuel have been made with no response, so during pt's hospitalization the main points of contact have been her son Nahid who is also coordinating with pt's daughter Mary.     Ingrid received update from RN that pt's dtr Mary is requesting a phone call from ingrid- ingrid called Mary and provided update. Mary reported understanding that d/c location will have to be whichever rehab is available the day of d/c and sw explained the option of transferring facilities if desired. Mary reported having talked with pt about how a friend lives near [facility] and that this friend's daughter works at the facility and that this may have changed pt's mind. Mary reported that [facility] would be first choice then [facility]. Mary discussed how pt doesn't want to go anywhere but that pt doesn't understand the medical cares she needs.     Upon follow up conversation with pt she confirmed that her goal is still rehab and a return to independent living and pt is ok with referral being " "sent to [facility].     Ingrid spoke with admissions at [facility] (p. [phone]) and was told there is bed availability for Tuesday, referral re-faxed through [employer]. Facility was initially informed pt was not interested in that area but pt has now opted for referral to be made. Phone system is down at facility so sw was unable to inform admissions staff of pt's change of mind but referral was re-faxed for review. Ingrid called later and left vm to follow up on referral, waiting to hear back. Jadiel in admissions called reporting pt has been accepted into a shared room (a private room opens Thursday).     Ingrid received call from Celeste with [facility] (p. [phone])- had opened adult protection case based on previous report and needs to know d/c location. Celeste reported that [facility] is managing pt's elderly waiver and MATI Golden, [place] liaison, has offered to come visit pt and pt is agreeable to a visit.     Ingrid spoke with Lucia in admissions at [facility] (p. [phone], f. [phone])- no bed availability until Thursday but referral was sent via Epic in case pt is not ready to d/c Tues or Wed.    Ingrid updated pt's dtr Mary of acceptance at [facility] as pt's son Nahid was not present when sw last entered pt's room. Mary reported she is keeping Nahid updated and will also talk with her mom about acceptance at [facility] and [facility]. Mary explained that family will not \"fight\" pt on whatever facility pt decides she wants to d/c to.      PAS completed in anticipation of d/c- confirmation code: [id number].    Assessment:  TCU, see bedside RN, PT/OT, medical team notes    Plan:    Anticipated Disposition:  Facility:  Accepted at both [facility] and [facility]    Barriers to d/c plan:  Medical readiness    Follow Up:  ingrid GASPAR will continue to follow    Christine Woodruff, SEE, MSW  7B   [phone] (pager) " [phone]  10/16/2017

## 2017-10-16 NOTE — PLAN OF CARE
Problem: Patient Care Overview  Goal: Plan of Care/Patient Progress Review  Discharge Planner PT   Patient plan for discharge: pt unsure  Current status: Dizziness a limiting factor this visit along with SOB, noted pt desaturated to 83% on RA initially supine>sit. On second trial VSS, sats 95+% on RA. Pt provided THANIA to stand and immediately reported severe dizziness, was unable to stand > 4 sec. Gait and pivot transfers deferred this visit per concerns. Pt able to perform supine ex, but overall activity tolerance reduced this visit.   Barriers to return to prior living situation: poor standing tolerance, dizziness; unable to ambulate this visit due to concerns  Recommendations for discharge: recommend discharge to TCU when pt is medically stable  Rationale for recommendations: below baseline functional status, increased falling risk and need for assist        Entered by: Olena Ordoñez 10/16/2017 10:51 AM

## 2017-10-16 NOTE — PLAN OF CARE
Problem: Surgery Nonspecified (Adult)  Goal: Signs and Symptoms of Listed Potential Problems Will be Absent, Minimized or Managed (Surgery Nonspecified)  Signs and symptoms of listed potential problems will be absent, minimized or managed by discharge/transition of care (reference Surgery Nonspecified (Adult) CPG).   Outcome: No Change  RN - Vitals stable. Pt c/o occasional generalized pain - utilizing lidoderm patch, otherwise declining further intervention. Maintaining adequate O2 saturation on room air. Continues to report dizziness and SOB with standing. Receiving order for 1x IV lasix to assist with pt weight gain since admission and reports of SOB. Pt voiding adequately utilizing the commode. Had 2x formed bowel movements as well. Incisions and CMS intact. PIV saline locked. Pt requiring strong encouragement for PO intake - fair appetite with poor tolerance of intake. calorie counts initiated. Pt expecting discharge to TCU soon. Stroke education booklet delivered to pt. Pt declining interest in reviewing. Pt son made aware of booklet and will review this evening.

## 2017-10-16 NOTE — PROGRESS NOTES
VASCULAR SURGERY PROGRESS NOTE    SUBJECTIVE:  -No acute events overnight. Ms Sheffield continues to feel unwell. She continues to report nausea and dizziness, but states that these are at recent baseline.    OBJECTIVE:  Vital signs:  Temp: 98.5  F (36.9  C) Temp src: Oral BP: 131/67 Pulse: 96 Heart Rate: 96 Resp: 24 SpO2: 98 % O2 Device: Nasal cannula Oxygen Delivery: 1 LPM      Intake/Output Summary (Last 24 hours) at 10/16/17 0824  Last data filed at 10/16/17 0700   Gross per 24 hour   Intake              680 ml   Output             1425 ml   Net             -745 ml       Vitals:    10/12/17 2123 10/13/17 1724 10/14/17 1616   Weight: 81.9 kg (180 lb 8 oz) 83.4 kg (183 lb 14.4 oz) 86.1 kg (189 lb 14.4 oz)       PHYSICAL EXAM:  General: Elderly female resting supine NAD   Cor:RRR  Pulm: unlabored breathing with occasional crackles  Abd: S/NT/ND. Well healed Supraumbilical incision with hernia.  LE: Warm and well perfused bilaterally. UE intact. Bruising and ecchymosis observed to the left forearm.   Neuro: CN II-XII intact no gross motor deficits.    Assessment:  Mrs. Sheffield is a 83 yo female w/ a history significant for DM II, HTN, CAD s/p EVAR with left renal artery snorkel for a 9cm Abdominal Aortic aneurysm. Postoperative care significant for posterior circulation stroke.     PLAN:  -Weight today as patient appeared mildly hypervolemic with possible pulmonary edema. Will consider initiation of diuretic if weight has continued to rise.  -Continue statin and ASA  -Continue PT. Consider candidacy for stroke rehab per PM&R  -Dispo to [facility] pending PM&R evaluation    Bladimir Solis MD  Cardiology Fellow, PGY-4

## 2017-10-16 NOTE — CONSULTS
PM&R CONSULT SERVICE     Received the consult   Per our new consult policy, the consult will be triaged by the admission team at the Rehabilitation Units.     Thank you for consulting the PM&R Department.   For any questions, please feel free to page me at [phone]     Chika Oneill MD   Department of PM&R

## 2017-10-16 NOTE — PLAN OF CARE
Problem: Patient Care Overview  Goal: Individualization & Mutuality  Outcome: No Change  Vitals:     10/15/17 1952 10/15/17 2222 10/16/17 0203 10/16/17 0434   BP: 129/74 132/60 137/81 131/67   BP Location: Right arm Right arm Right arm Right arm   Pulse:       96   Resp: 22 20 20 24   Temp: 97.7  F (36.5  C) 99  F (37.2  C)   98.5  F (36.9  C)   TempSrc: Oral Oral   Oral   SpO2: 93% 95% 95% 98%   Weight:           Height:             Patient repeatedly pressed call light at beginning of shift due to anxiety, requested Xanax. Per 12 hours, max temp 99.0. Has expiratory wheezing and diminished LS, R 20-24. Encouraged deep breathing and coughing and IS. Reached 500 on IS. Recheck temp 98.5. Otherwise VSS. O2 sats high 90s on 1 L/min NC due to heavy labored breathing. Administered scheduled and PRN albuterol inhaler x2 with some effectiveness, decreased expiratory wheezing noted. Patient occasionally not effective with using inhaler, notified MD who ordered one time albuterol neb which patient refused. Patient A & O x3-4, c/o HA, MD aware and ordered one time PRN Tylenol. Patient requires a lot of encouragement. Up to commode with assist of 1-2, voiding adequate amount, had one medium formed brown BM. When up to commode, patient c/o nausea, PRN Zofran x1 effective, refused MD Roberto aware. Incisions dermabonded, MIRIAM, no drainage. All pulses intact, CMS intact.  at bedtime. Red bruising noted bilateral arms. Slept in between cares. Continue POC.

## 2017-10-16 NOTE — PROGRESS NOTES
CLINICAL NUTRITION SERVICES - REASSESSMENT NOTE     Nutrition Prescription    RECOMMENDATIONS FOR MDs/PROVIDERS TO ORDER:  - Consider scheduling zofran prior to mealtimes for avoidance of nausea inhibiting PO intakes.   - Consider trial of appetite stimulant given chronic reduced appetite (~ 3 months prior to admission), continued as an inpatient.     Malnutrition Status:    Severe malnutrition in the context of acute on chronic illness     Recommendations already ordered by Registered Dietitian (RD):  - Calorie counts 10/17-10/19  - Ensure Plus TID btw meals   -Multivitamin/mineral ordered in the setting of inadequate PO     Future/Additional Recommendations:  If TF indicated, would recommend: Impact Peptide @ goal 45 ml/hr (1080 ml/day) to provide 1620 kcals (24 kcal/kg/day), 102 g PRO (1.5 g/kg/day), 832 ml free H2O, 69 g Fat (50% from MCTs), 151 g CHO and no Fiber daily.     EVALUATION OF THE PROGRESS TOWARD GOALS   Diet: Regular + Magic Cup btw meals   Intake (PO): Unable to interview pt today - pt sound asleep and writer unable to wake with increasing volume of voice. Overall, poor PO intake due to minimal appetite and intermittent nausea. However, PO improvement each day noted  (see tomy counts below).      Calorie counts:   10/10:  241 kcal, 12 gm PRO   10/11:  311 kcal, 16 gm PRO   10/12:  654 kcal, 28 gm PRO   10/13:  764 kcal, 27 gm PRO     4-day Avg =  493 kcal/d (7 kcal/kg), 21 gm PRO/d (0.3 gm/kg) -equiv to- ~25% assessed kcal and protein needs        NEW FINDINGS   Meds: PRN zofran  GI: x1 BM yesterday, x2 BM so far today     MALNUTRITION  % Intake: </=50% for >/= 1 month (severe) PTA; </=50% for >5 days since admit   % Weight Loss: > 7.5% in 3 months (severe) PTA; no weight loss noted since admit   Subcutaneous Fat Loss: None observed  Muscle Loss: None observed  Fluid Accumulation/Edema: None noted  Malnutrition Diagnosis: Severe malnutrition in the context of acute on chronic illness     Previous  Goals   Total avg nutritional intake to meet a minimum of 25 kcal/kg and 1.2 g PRO/kg daily (per dosing wt 67 kg).  Evaluation: Not met    Previous Nutrition Diagnosis  Inadequate oral intake related to decreased appetite hindering PO intakes as evidenced by pt reports poor PO intakes and unintentional 13% wt loss over the past 3 months (estimated timeline)   Evaluation: No change    CURRENT NUTRITION DIAGNOSIS  Inadequate oral intake related to reduced appetite as evidenced by 3-day avg calorie counts of 7 kcal/kg and 0.3 gm pro/kg and intentional 13% wt loss over 3 months (prior to admission).       INTERVENTIONS  Implementation  Nutrition education - Unable to provide as pt sound asleep. Will f/u as able   Medical food supplement therapy - Ordered Ensure Plus TID btw meals  Multivitamin/mineral supplementation     Goals  Total avg nutritional intake to meet a minimum of 25 kcal/kg and 1.2 g PRO/kg daily (per dosing wt 67 kg).    Monitoring/Evaluation  Progress toward goals will be monitored and evaluated per protocol.    Yanci Bay RD, LD  5A/7B: [phone]

## 2017-10-17 NOTE — PLAN OF CARE
Problem: Surgery Nonspecified (Adult)  Goal: Signs and Symptoms of Listed Potential Problems Will be Absent, Minimized or Managed (Surgery Nonspecified)  Signs and symptoms of listed potential problems will be absent, minimized or managed by discharge/transition of care (reference Surgery Nonspecified (Adult) CPG).   Vitals:     10/16/17 0849 10/16/17 1100 10/16/17 1534 10/16/17 2015   BP: 108/60 112/58 126/67 116/60   BP Location: Right arm Right arm Right arm Right arm   Pulse: 81 80 87 100   Resp: 20 20 20 20   Temp: 97.5  F (36.4  C) 97.2  F (36.2  C) 96.8  F (36  C) 96.6  F (35.9  C)   TempSrc: Oral Oral Oral Oral   SpO2: 93% 95% 96% 91%   Weight:   81.6 kg (180 lb)       Height:             Pt is alert.Right arm has brushing still.Poor appetite.Refuse to eat dinner.Voiding frequently this shift. Complaining of dizziness ,nausea and headache through out the shift.MD aware .will monitor.

## 2017-10-17 NOTE — PLAN OF CARE
Vitals:    10/16/17 1534 10/16/17 2015 10/16/17 2253 10/17/17 0740   BP: 126/67 116/60 111/65 115/64   BP Location: Right arm Right arm Right arm Right arm   Pulse: 87 100 88 80   Resp: 20 20 20 20   Temp: 96.8  F (36  C) 96.6  F (35.9  C) 97.9  F (36.6  C) 97.5  F (36.4  C)   TempSrc: Oral Oral Oral Axillary   SpO2: 96% 91% 91% 93%   Weight:       Height:         Afebrile, 93% RA, other VSS. Pt reports headache on and off but declined interventions, team aware. Denies nausea and SOB. scopolamine patch on L ear, patch to be removed tomorrow. Infrequent cough. Poor appetite, 25% of breakfast. , 144. Blanchable redness on bottom. R arm bruising, left slightly bruised. Stroke education completed per patient education RN. Report given to RN at TCU. Pt transferred to facility.

## 2017-10-17 NOTE — PLAN OF CARE
Problem: Patient Care Overview  Goal: Plan of Care/Patient Progress Review  Occupational Therapy Discharge Summary     Reason for therapy discharge:    Discharged to transitional care facility.     Progress towards therapy goal(s). See goals on Care Plan in [facility] electronic health record for goal details.  Goals partially met.  Barriers to achieving goals:   discharge from facility.     Therapy recommendation(s):    Continued therapy is recommended.  Rationale/Recommendations:  to maximize independence to complete ADLs/IADLs at previous functional level.

## 2017-10-17 NOTE — DISCHARGE SUMMARY
VASCULAR SURGERY HOSPITAL DISCHARGE SUMMARY    ADMISSION DATE:  10/6/2017  ADMISSION DIAGNOSES: Abdominal aortic aneurysm (AAA) (H) [I71.4]    DISCHARGE DATE:  10/17/2017  DISCHARGE DIAGNOSES:   Problem List Items Addressed This Visit        Circulatory    AAA (abdominal aortic aneurysm) (H) - Primary    Relevant Medications    ALPRAZolam (XANAX) tablet 0.5 mg    lidocaine 1 % 1 mL    lidocaine (LMX4) kit    sodium chloride (PF) 0.9% PF flush 3 mL    sodium chloride (PF) 0.9% PF flush 3 mL    alum & mag hydroxide-simethicone (MYLANTA ES/MAALOX  ES) suspension 30 mL    clindamycin (CLEOCIN) infusion 900 mg (Completed)    melatonin 3 MG tablet    scopolamine (TRANSDERM) 72 hr patch (Start on 10/18/2017)    rosuvastatin (CRESTOR) 40 MG tablet    lidocaine (LIDODERM) 5 % Patch    ALPRAZolam (XANAX) 0.5 MG tablet    Other Relevant Orders    Blood component (Completed)    Blood component (Completed)    Glucose by meter (Completed)    Blood component (Completed)    Blood component (Completed)    Arterial Panel (Completed)    Lactic acid whole blood (Completed)    Arterial Panel (Completed)    Arterial Panel (Completed)    Transfer patient (Completed)    Vital signs    Neuro checks    Doppler Pulse    Head of bed    Activity: Bedrest    Patient education    Peripheral IV catheter    Glucose monitor nursing POCT    Turn cough deep breathe    Incentive spirometry nursing    Oxygen: Nasal cannula, Oxygen mask    Multimodal Surgical Pain Management  Order Set Link    Required post-op order set marker (used as alert trigger) (Completed)    Full Code    Indwelling urinary catheter ([place])     Discontinue indwelling urinary catheter ([place])    Dressing Change - CLOSED Incision:    Using only Pharmacological Prophylaxis  (Completed)    Platelet count (Completed)    Platelet count    Notify Provider    Glucose by meter (Completed)    Glucose by meter (Completed)    Troponin I (Completed)    Activated clotting time POCT (Completed)     Activated clotting time POCT (Completed)    Activated clotting time POCT (Completed)    Activated clotting time POCT (Completed)    Activated clotting time POCT (Completed)    Activated clotting time POCT (Completed)    Glucose by meter (Completed)    Glucose by meter (Completed)    Glucose by meter (Completed)    Glucose by meter (Completed)    Glucose by meter (Completed)    Glucose by meter (Completed)    Glucose by meter (Completed)    Glucose by meter (Completed)    Glucose by meter (Completed)    Glucose by meter (Completed)    Glucose by meter (Completed)    Glucose by meter (Completed)    Glucose by meter (Completed)    Glucose by meter (Completed)    Glucose by meter (Completed)    Glucose by meter (Completed)    Glucose by meter (Completed)    Glucose by meter (Completed)    Glucose by meter (Completed)    Glucose by meter (Completed)    Glucose by meter (Completed)    Glucose by meter (Completed)    Glucose by meter (Completed)    Glucose by meter (Completed)    Glucose by meter (Completed)    Glucose by meter (Completed)    Glucose by meter (Completed)    Glucose by meter (Completed)    Glucose by meter (Completed)    Glucose by meter (Completed)    Glucose by meter (Completed)    Glucose by meter (Completed)    Glucose by meter (Completed)    Glucose by meter (Completed)    Glucose by meter (Completed)    Glucose by meter (Completed)    Glucose by meter (Completed)    Glucose by meter (Completed)    Glucose by meter (Completed)    Glucose by meter (Completed)    Glucose by meter (Completed)    Glucose by meter (Completed)    Glucose by meter (Completed)    CT Abdomen Pelvis w/o Contrast          CONDITION AT DISCHARGE: Stable  Discharge Disposition   Discharged to short-term care facility    DISCHARGING PROVIDER: JERROD Peguero, REJI   DATE OF SERVICE: 10/17/17    VASCULAR SURGEON: Dr. Casillas   Procedure(s):  -Endovascular Repair Abdominal Aortic Aneurysm  with Endorant with Limb Extention x1.  -  Aortic Dissection x1   -Revision of left Brachial  Artery Repair   -Arterial limited duplex study - Wound Class: I-Clean    Consultations This Hospital Stay   PHARMACY IP CONSULT  SOCIAL WORK IP CONSULT  VASCULAR ACCESS CARE ADULT IP CONSULT  PHYSICAL THERAPY ADULT IP CONSULT  OCCUPATIONAL THERAPY ADULT IP CONSULT  MEDICATION HISTORY IP PHARMACY CONSULT  VASCULAR ACCESS CARE ADULT IP CONSULT  NEUROLOGY GENERAL ADULT IP CONSULT  VASCULAR ACCESS CARE ADULT IP CONSULT  VASCULAR ACCESS CARE ADULT IP CONSULT  VASCULAR ACCESS CARE ADULT IP CONSULT  PEDS PHYSICAL MEDICINE & REHAB IP CONSULT  PHYSICAL MEDICINE & REHAB GENERAL ADULT IP CONSULT  VASCULAR ACCESS CARE ADULT IP CONSULT  VASCULAR ACCESS CARE ADULT IP CONSULT  SWALLOW EVAL SPEECH PATH AT BEDSIDE IP CONSULT  VASCULAR ACCESS CARE ADULT IP CONSULT  PATIENT LEARNING CENTER IP CONSULT  PHYSICAL THERAPY ADULT IP CONSULT  OCCUPATIONAL THERAPY ADULT IP CONSULT    HPI:  Sandy Sheffield is an 84 year old female with past medical history of COPD and cardiac disease who was discovered to have a 9 cm abdominal aortic aneurysm during a presentation of pain of unclear etiology.  A non-contrast CT showed that she did not have adequate neck for standard endovascular repair, after careful consideration, it was felt that she warranted an attempt at repair rather than palliation and that open surgery would not be survivable for her.  Dr. Casillas considered her for an endovascular repair using attempts to preserve the renal arteries but recognizing that she may need to go on dialysis if this failed, which she was comfortable with.  On 10/7/2017, she underwent endovascular repair of abdominal aortic aneurysm and left renal stent placement with Dr. Casillas.  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  She participated in therapies and has good rehab potential.  She tolerated regular diet.  She had return of her normal bowel and bladder function.  Her pain was  controlled with Tylenol. She was discharged to [facility] in stable condition on 10/17/2017.  She will have a follow up CT scan and office visit with Dr. Casillas in one month.       PHYSICAL EXAM:  General: Elderly female resting supine NAD   Cor:RRR  Pulm: unlabored breathing  Abd: S/NT/ND. Well healed Supraumbilical incision with hernia.  LE: Warm and well perfused bilaterally. UE intact. Bruising and ecchymosis observed to the left forearm. Left arm incision C/D/I    Neuro: CN II-XII intact no gross motor deficits.         PRIMARY CARE PROVIDER:  Primary Care Physician   Cecilio Montes      Code Status   Full Code      Discharge Orders     CT Abdomen Pelvis w/o Contrast   Administration of IV contrast (contrast agent, dose, and amount) will be tailored to this examination per the appropriate written protocol listed in the Protocol E-Book, or by the supervising imaging provider.      General info for SNF   Length of Stay Estimate: Short Term Care: Estimated # of Days <30  Condition at Discharge: Stable  Level of care:skilled   Rehabilitation Potential: Good  Admission H&P remains valid and up-to-date: Yes  Recent Chemotherapy: N/A  Use Nursing Home Standing Orders: Yes     Intake and output   Every shift     Daily weights   Call Provider for weight gain of more than 2 pounds per day or 5 pounds per week.     Activity - Up with nursing assistance     Reason for your hospital stay   You underwent endovascular abdominal aneurysm repair with Dr. Casillas     Wound care   Site:  Left arm incision  Instructions: okay to keep left arm incision open to air     Additional Discharge Instructions   Okay to shower, no tub baths     When to contact your care team   Contact Dr. Casillas's vascular surgery team if any increased pain, swelling, incision drainage or fever develop.     With questions, concerns, or to request an appointment, please call either:    Cecile Tavares, Care Coordinator RN, Vascular  Surgery  [phone]    Vascular Call Center  [phone]    To contact someone after 5 pm, on a weekend, or on a Holiday, please call:  [facility]  [phone], option 4 to have a member of the Vascular Surgery Service paged.     Follow Up and recommended labs and tests   Follow up with Dr. Casillas in one months with CT scan prior to appointment     Glucose monitor nursing POCT   Before meals and at bedtime     Discharge Instructions   Patient declined administration of the pneumococcal and influenza vaccines during her hospitalization     Full Code     Physical Therapy Adult Consult   Evaluate and treat as clinically indicated.    Reason:  Stroke rehab     Occupational Therapy Adult Consult   Evaluate and treat as clinically indicated.    Reason: stroke rehab     Fall precautions     Advance Diet as Tolerated   Follow this diet upon discharge: Orders Placed This Encounter     Snacks/Supplements Adult:       Snacks/Supplements Adult: Ensure Plus (Adult); Between Meals     Advance Diet as Tolerated: Regular Diet Adult       Discharge Medications   Current Discharge Medication List      START taking these medications    Details   melatonin 3 MG tablet Take 1 tablet (3 mg) by mouth nightly as needed for sleep  Qty: 30 tablet, Refills: 0    Associated Diagnoses: Abdominal aortic aneurysm (AAA) without rupture (H)      scopolamine (TRANSDERM) 72 hr patch Place 1 patch onto the skin every 72 hours  Qty: 30 patch, Refills: 0    Associated Diagnoses: Abdominal aortic aneurysm (AAA) without rupture (H)      rosuvastatin (CRESTOR) 40 MG tablet Take 1 tablet (40 mg) by mouth daily  Qty: 30 tablet, Refills: 1    Associated Diagnoses: Abdominal aortic aneurysm (AAA) without rupture (H)      lidocaine (LIDODERM) 5 % Patch Place 2 patches onto the skin every 24 hours  Qty: 30 patch, Refills: 0    Associated Diagnoses: Abdominal aortic aneurysm (AAA) without rupture (H)         CONTINUE these  medications which have CHANGED    Details   ALPRAZolam (XANAX) 0.5 MG tablet Take 1 tablet (0.5 mg) by mouth nightly as needed for anxiety (Anxiety)  Qty: 45 tablet, Refills: 0    Associated Diagnoses: Abdominal aortic aneurysm (AAA) without rupture (H)         CONTINUE these medications which have NOT CHANGED    Details   albuterol (PROVENTIL HFA) 108 (90 BASE) MCG/ACT Inhaler Inhale 2 puffs into the lungs every 6 hours as needed       ASPIRIN PO Take 81 mg by mouth daily      Omega-3 Fatty Acids (FISH OIL BURP-LESS PO) Take 1 capsule by mouth daily      VITAMIN D, CHOLECALCIFEROL, PO Take 2,000 Units by mouth daily            Allergies   Allergies   Allergen Reactions     Penicillins Nausea and Vomiting     Shrimp Nausea and Vomiting       Time Spent on this Encounter   I, Jessica Moya, personally saw the patient today and spent less than or equal to 30 minutes discharging this patient.              Jessica ELDER, CNS  Division of Vascular Surgery  [facility]  Pager [phone]

## 2017-10-17 NOTE — PLAN OF CARE
Problem: Patient Care Overview  Goal: Plan of Care/Patient Progress Review  Physical Therapy Discharge Summary     Reason for therapy discharge:    Discharged to transitional care facility.     Progress towards therapy goal(s). See goals on Care Plan in [facility] health record for goal details.  Goals partially met.  Barriers to achieving goals:   discharge from facility.     Therapy recommendation(s):    Continued therapy is recommended.  Rationale/Recommendations:  due to below baseline functional status, far reduced activity tolerance, safety with gait and transfers.

## 2017-10-17 NOTE — PLAN OF CARE
Problem: Patient Care Overview  Goal: Plan of Care/Patient Progress Review  Patient still has expiratory wheezing but denies SOB, is on RA, HOB elevated. +BS, no BM. BG at 0200- 140. To start calorie count today x 3 days.   Adequate urine output, uses bed pan to void. Uses call to make needs known. Denies pain; head ache was relieved by Tylenol given by orville ORTEGA. PIV was pulled by pt around 0100. Incision in mathieu groin and left arm, cdi. Still has redness in mathieu RUE. Sleeps in between cares. Son is at bed side. Continue poc.

## 2017-10-17 NOTE — PROGRESS NOTES
VASCULAR SURGERY PROGRESS NOTE    SUBJECTIVE:  -No acute events overnight.  Reports improved breathing this morning.    OBJECTIVE:  Vital signs:  Temp: 97.5  F (36.4  C) Temp src: Axillary BP: 115/64 Pulse: 80   Resp: 20 SpO2: 93 % O2 Device: None (Room air)        Intake/Output Summary (Last 24 hours) at 10/16/17 0824  Last data filed at 10/16/17 0700   Gross per 24 hour   Intake              680 ml   Output             1425 ml   Net             -745 ml       Vitals:    10/13/17 1724 10/14/17 1616 10/16/17 1100   Weight: 83.4 kg (183 lb 14.4 oz) 86.1 kg (189 lb 14.4 oz) 81.6 kg (180 lb)       PHYSICAL EXAM:  General: Elderly female resting supine NAD   Cor:RRR  Pulm: unlabored breathing  Abd: S/NT/ND. Well healed Supraumbilical incision with hernia.  LE: Warm and well perfused bilaterally. UE intact. Bruising and ecchymosis observed to the left forearm.   Neuro: CN II-XII intact no gross motor deficits.    BMP  Recent Labs  Lab 10/13/17  0733 10/12/17  0738 10/11/17  1608 10/11/17  0806    136  --  137   POTASSIUM 3.8 3.8 4.2 4.0   CHLORIDE 107 107  --  108   CO2 21 20  --  22   BUN 24 29  --  28   CR 2.09* 2.21*  --  2.38*   * 116*  --  127*     CBC  Recent Labs  Lab 10/16/17  0800 10/13/17  0733 10/12/17  0738 10/11/17  0806   WBC  --  9.5 9.8 13.3*   HGB  --  8.3* 8.5* 9.3*    183 182 159         Assessment:  Mrs. Sheffield is a 85 yo female w/ a history significant for DM II, HTN, CAD s/p EVAR with left renal artery snorkel for a 9cm Abdominal Aortic aneurysm with Dr. Casillas. Postoperative care significant for posterior circulation stroke.     PLAN:  -Continue statin and ASA  -Continue PT. Consider candidacy for stroke rehab per PM&R  - will obtain non-contrast CT scan and follow up with Dr. Casillas in one month  -Discharge to TCU today     Please do not hesitate to contact Dr. Casillas's vascular surgery team with any questions.       Jessica Moya APRN, CNS  Division of Vascular  Surgery  [facility]  Pager [phone]

## 2017-10-17 NOTE — PROGRESS NOTES
Social Work Services Discharge Note      Patient Name:  Sandy Sheffield     Anticipated Discharge Date:  10/17/17    Discharge Disposition:   TCU:  [facility]   [address] [place] [address]  P. [phone], F. [phone]     Pre-Admission Screening (PAS) online form has been completed.  The Level of Care (LOC) is:  Determined  Confirmation Code is:  [id number].  Patient/caregiver informed of referral to [facility] for Pre-Admission Screening for skilled nursing facility (SNF) placement and to expect a phone call post discharge from SNF.     Additional Services/Equipment Arranged:  W/c transport arranged via [employer] ([phone]) for today at 1pm.     Patient / Family response to discharge plan:  Pt and kendra Whitfield are agreeable     Persons notified of above discharge plan:  Pt, pt's kendra Whitfield (who will update Mary), bedside nurse, charge nurse, NST, receiving facility, Vascular team, pt's [facility] ([facility]) via voicemail.    Staff Discharge Instructions:  Please fax discharge orders and signed hard scripts for any controlled substances.  Please print a packet and send with patient.     CTS Handoff completed:  YES    Medicare Notice of Rights provided to the patient/family:  YES    SEE Anton, MSW  7B   [phone] (pager) [phone]  10/17/2017

## 2017-10-17 NOTE — PROGRESS NOTES
Social Work Services Progress Note    Hospital Day: 12  Date of Initial Social Work Evaluation:  10/10/17- please see for details  Collaborated with:  Chart review, team rounds, Vascular team, pt    Data:  Pt is being followed for TCU placement and has been accepted to [facility]. Per Vascular team pt is ready to d/c today.     Intervention:  Sw met with pt in pt's room to check in and discuss d/c today. Pt reported not feeling much better than yesterday but understands that she will be discharged today. Sw presented d/c location options and pt would like to go to [facility]. Pt discussed how change is scary for her and she doesn't want to move anywhere else. Sw validated and normalized pt's feelings and nervousness and explained that pt cannot stay here and that rehab is the next step towards regaining independence. Pt reported that knowing that her friend Nell lives nearby is comforting for her and is hopeful that she will be able to see her soon while at [facility].     Assessment:  TCU, see bedside RN, PT/OT, medical team notes    Plan:    Anticipated Disposition:  Facility:  [facility]    Barriers to d/c plan:  NA    Follow Up:  ingrid GASPAR will continue to follow    SEE Anton, MSW  7B   [phone] (pager) [phone]  10/17/2017

## 2017-10-18 NOTE — TELEPHONE ENCOUNTER
Carlos received call from [facility] reporting she is pt's  and inquiring about pt's d/c location. Carlos informed Aicha of pt's d/c location.     SEE Anton, MSW  7B   [phone] (pager) [phone]  10/18/2017

## 2017-11-16 PROBLEM — L97.909: Status: ACTIVE | Noted: 2017-01-01

## 2017-11-16 NOTE — IP AVS SNAPSHOT
"` `           [facility]: [phone]                                              INTERAGENCY TRANSFER FORM - NURSING   2017                    Hospital Admission Date: 2017  CHINO JACKSON   : 1932  Sex: Female        Attending Provider: Jace Hernández MD     Allergies:  Ibuprofen, Penicillins, Shrimp, Tylenol [Acetaminophen]    Infection:  None   Service:  INTERNAL MED    Ht:  1.727 m (5' 8\")   Wt:  75.8 kg (167 lb 1.6 oz)   Admission Wt:  83.3 kg (183 lb 11.2 oz)    BMI:  25.41 kg/m 2   BSA:  1.91 m 2            Patient PCP Information     Provider PCP Type    CORY GIBSON [facility]      Current Code Status     Date Active Code Status Order ID Comments User Context       Prior      Code Status History     Date Active Date Inactive Code Status Order ID Comments User Context    2017 10:30 AM  Special Code [id number] [facility] Marguerite Nunez, NP Outpatient    2017 10:30 AM 2017 10:30 AM DNR/DNI [id number]  Marguerite Nunez NP Outpatient    2017 11:15 AM 2017 10:30 AM DNR/DNI [id number]  Sakshi Ojeda PA-C Inpatient    2017 11:13 AM 2017 11:15 AM Full Code [id number]  Sakshi Ojeda PA-C Inpatient    2017  8:44 PM 2017 11:13 AM DNR/DNI [id number]  Alanis Guevara PA-C Inpatient    10/17/2017  9:29 AM 2017  8:44 PM Full Code [id number]  Jessica Moya APRN CNS Outpatient    10/7/2017  7:32 PM 10/17/2017  9:29 AM Full Code [id number]  Dhaval Carcamo MD Inpatient    10/6/2017 10:54 PM 10/7/2017  7:32 PM Full Code [id number]  Jomar Sifuentes MD Inpatient      Advance Directives        Does patient have a scanned Advance Directive/ACP document in EPIC?           Yes        Hospital Problems as of 2017              Priority Class Noted POA    Infected aortic graft (H) Medium  2017 Unknown      Non-Hospital Problems as of 2017              Priority Class Noted    " AAA (abdominal aortic aneurysm) (H) Medium  10/6/2017    Abdominal aortic aneurysm (H) Medium  10/7/2017    Failure to thrive (0-17) Medium  11/16/2017    Ischemic ulcer of lower extremity (H) Medium  11/16/2017    Constipation Medium  11/18/2017      Immunizations     None         END      ASSESSMENT     Discharge Profile Flowsheet     EXPECTED DISCHARGE     COMMUNICATION ASSESSMENT      Expected Discharge Date  -- (TCU) 11/17/17 1056   Patient's communication style  spoken language (English or Bilingual) 11/16/17 1947    DISCHARGE NEEDS ASSESSMENT     SKIN      Anticipated Changes Related to Illness  none 11/16/17 1947   Inspection of bony prominences  Unable to assess (Pt refused) 11/27/17 0919    Equipment Currently Used at Home  walker, rolling 11/21/17 1055   Full except areas not inspected   Hip, left;Hip, right;Knee, left;Knee, right 11/27/17 0958    FUNCTIONAL LEVEL CURRENT     Inspection under devices  Unable to assess (Pt refused) 11/27/17 0919    Ambulation  4 - completely dependent 11/27/17 0919   Skin WDL  ex;characteristics 11/26/17 1814    Transferring  4 - completely dependent 11/27/17 0919   Skin Temperature  warm 11/27/17 0919    Toileting  4 - completely dependent 11/27/17 0919   Skin Moisture  dry 11/27/17 0919    Bathing  4 - completely dependent 11/27/17 0919   Skin Elasticity  quick return to original state 11/25/17 1519    Dressing  4 - completely dependent 11/27/17 0919   Skin Integrity  drain/device(s);bruise(s) 11/27/17 0919    Eating  4 - completely dependent 11/27/17 0919   Procedural focused assessment (identify areas inspected)   Knee, left;Knee, right;Ankle, left;Ankle, right;Heel, left;Heel, right;Foot, left;Foot, right;Elbow, left;Elbow, right 11/26/17 0131    Communication  2 - difficulty speaking (not related to language barrier) 11/27/17 0919   SAFETY      Swallowing  2 - difficulty swallowing liquids 11/27/17 0919   Safety WDL  WDL 11/27/17 0919    GASTROINTESTINAL  "(ADULT,PEDIATRIC,OB)     All Alarms  alarm(s) activated and audible 11/27/17 0919    GI WDL  -- (JJ) 11/26/17 1814   Aspiration Risk Screen  advanced age;swallowing difficulty 11/27/17 0919    Abdominal Appearance  other (see comments) (Unable to assess - pt refused) 11/27/17 0919   Airway Safety Measures  oxygen flowmeter 11/26/17 1814    Last Bowel Movement  11/25/17 11/27/17 0919   Additional Documentation  Aspiration Risk Screen (Row) 11/27/17 0919    Passing flatus  yes (heard) 11/25/17 1519                      Assessment WDL (Within Defined Limits) Definitions           Safety WDL     Effective: 09/28/15    Row Information: <b>WDL Definition:</b> Bed in low position, wheels locked; call light in reach; upper side rails up x 2; ID band on<br> <font color=\"gray\"><i>Item=AS safety wdl>>List=AS safety wdl>>Version=F14</i></font>      Skin WDL     Effective: 09/28/15    Row Information: <b>WDL Definition:</b> Warm; dry; intact; elastic; without discoloration; pressure points without redness<br> <font color=\"gray\"><i>Item=AS skin wdl>>List=AS skin wdl>>Version=F14</i></font>      Vitals     Vital Signs Flowsheet     VITAL SIGNS     Sleep  awake with occasional pain 11/26/17 2132    Temp  98.2  F (36.8  C) 11/26/17 0723   PAIN ASSESSMENT/NONVERBAL ICU (ADULT)      Temp src  Axillary 11/26/17 0723   Presence of Pain  No nonverbal indicators of pain present 11/26/17 0624    Resp  18 11/26/17 0723   Assessment Indicator  Post intervention 11/26/17 0624    Pulse  111 11/25/17 1233   Nonverbal Indicators of Pain  Restless 11/26/17 0624    Heart Rate  83 11/26/17 0723   Pain Management Interventions  Single medication modality 11/26/17 0624    Pulse/Heart Rate Source  Monitor 11/25/17 0723   Response to Interventions  Absence of nonverbal indicators of pain 11/26/17 0624    BP  138/66 11/26/17 0723   ANALGESIA SIDE EFFECTS MONITORING      BP Location  Right leg 11/26/17 0723   Side Effects Monitoring: Respiratory Quality " " R 11/26/17 2217    OXYGEN THERAPY     Side Effects Monitoring: Respiratory Depth  N 11/26/17 2217    SpO2  97 % 11/26/17 0723   Side Effects Monitoring: Sedation Level  S 11/26/17 2217    O2 Device  Nasal cannula 11/26/17 0723   HEIGHT AND WEIGHT      Oxygen Delivery  3 LPM 11/26/17 0723   Height  1.727 m (5' 8\") 11/16/17 1630    PAIN/COMFORT     Height Method  Stated 11/16/17 1630    Patient Currently in Pain  sleeping: patient not able to self report 11/27/17 0958   Weight  75.8 kg (167 lb 1.6 oz) 11/25/17 1228    Preferred Pain Scale  CAPA (Clinically Aligned Pain Assessment) ([facility] Adults Only) 11/26/17 2217   Weight Method  Standing scale 11/25/17 1228    Pain Location  Shoulder 11/26/17 2132   POSITIONING      Pain Orientation  Left 11/26/17 2132   Body Position  side-lying, right 11/27/17 0919    Pain Descriptors  Aching;Discomfort 11/26/17 2132   Head of Bed (HOB)  HOB flat 11/27/17 0919    Pain Management Interventions  analgesia administered 11/26/17 2132   Positioning/Transfer Devices  pillows;in use 11/27/17 0919    Pain Intervention(s)  Medication (See eMAR) 11/26/17 2132   DAILY CARE      Response to Interventions  Absence of nonverbal indicators of pain 11/26/17 2217   Activity Management  activity adjusted per tolerance 11/27/17 0919    CLINICALLY ALIGNED PAIN ASSESSMENT (CAPA) ([facility] ADULTS ONLY)     Activity Assistance Provided  assistance, 2 people 11/27/17 0919    Comfort  tolerable with discomfort 11/26/17 2132   POINT OF CARE TESTING      Change in Pain  about the same 11/26/17 2132   Puncture Site  fingertip 11/21/17 1320    Pain Control  partially effective 11/26/17 2132   Bedside Glucose (mg/dl )   132 mg/dl 11/21/17 1320    Functioning  pain keeps me from doing most of what I need to do 11/26/17 2132                 Patient Lines/Drains/Airways Status    Active LINES/DRAINS/AIRWAYS     Name: Placement date: Placement time: Site: Days: Last " dressing change:    Urethral Catheter 11/24/17   0426      3     Pressure Injury 11/19/17 Sacrum Stage 2 11/19/17   1852    7     Incision/Surgical Site 10/07/17 Left Arm 10/07/17   1823    50     Incision/Surgical Site 10/07/17 Bilateral;Anterior;Right Groin 10/07/17   1824    50     Incision/Surgical Site 10/07/17 Bilateral Groin 10/07/17   1851    50             Patient Lines/Drains/Airways Status    Active PICC/CVC     None            Intake/Output Detail Report     Date Intake     Output Net    Shift P.O. I.V. IV Piggyback Total Urine Total       Day 11/26/17 0000 - 11/26/17 0659 120 -- -- 120 500 500 -380    Debo 11/26/17 0700 - 11/26/17 1459 -- -- -- -- 250 250 -250    Noc 11/26/17 1500 - 11/26/17 2359 200 -- -- 200 600 600 -400    Day 11/27/17 0000 - 11/27/17 0659 -- -- -- -- -- -- 0    Debo 11/27/17 0700 - 11/27/17 1459 -- -- -- -- 575 575 -575      Last Void/BM       Most Recent Value    Urine Occurrence 1 at 11/23/2017 2030    Stool Occurrence 1 at 11/26/2017 2100      Case Management/Discharge Planning     Case Management/Discharge Planning Flowsheet     LIVING ENVIRONMENT     FINAL RESOURCES      Lives With  other (see comments) (Pt lives in adult foster home) 11/21/17 1055   Equipment Currently Used at Home  walker, rolling 11/21/17 1055    Living Arrangements  group home 11/21/17 1055   ABUSE RISK SCREEN      Provides Primary Care For  no one 11/16/17 1947   QUESTION TO PATIENT:  Has a member of your family or a partner(now or in the past) intimidated, hurt, manipulated, or controlled you in any way?  no 11/16/17 1947    COPING/STRESS     QUESTION TO PATIENT: Do you feel safe going back to the place where you are living?  yes 11/16/17 1947    Major Change/Loss/Stressor  other (see comments) (Changes from having to move into care facility from son's) 11/17/17 1125   OBSERVATION: Is there reason to believe there has been maltreatment of a vulnerable adult (ie. Physical/Sexual/Emotional abuse, self  neglect, lack of adequate food, shelter, medical care, or financial exploitation)?  no 11/16/17 1947    EXPECTED DISCHARGE     (R) MENTAL HEALTH SUICIDE RISK      Expected Discharge Date  -- (TCU) 11/17/17 1056   Are you depressed or being treated for depression?  No 11/16/17 1947    DISCHARGE PLANNING     HOMICIDE RISK      Anticipated Changes Related to Illness  none 11/16/17 1947   Feels Like Hurting Others  no 11/16/17 1947

## 2017-11-16 NOTE — H&P
[facility]    Internal Medicine History and Physical - Gold Service       Date of Admission:  11/16/2017    Assessment & Plan   Sandy Sheffield is a 85 year old female  admitted on 11/16/2017. She has a PMH of COPD, Lupus, HLD, CKD, HTN, DMII, CAD, and recent admission to the SICU (10/6-10/17/17) for AAA repair c/b postoperative CVA. Patient admitted from Assisted living with family reports of one week hx of  worsening abdominal pain, fatigue, and bilateral LE pain and skin changes. Patient admitted to Medicine for for further evaluation and Vascular Surgery consult.      # Sub-acute abdominal pain       Recent Endovascular AAA repair: Presented 10/6/17 with incidental finding of 9 cm (descending) AAA on outpatient w/u of abdominal pain. Endovascular repair of AAA and left renal renal stent placement with Sonja Infante, 10/7/17. Course c/b posterior circulation stroke. On d/c (10/17/17), pllan for f/u CT late 11/2017. Today, presented with 1 week hx of progressive fatigue, abdominal pain, and bilateral LE shin changes and pain. On admission, patient with stable VS, Hgb 9.7, WBC 11.3, , LA 1.0, Platelets 218, LFT's wnl, Cr doubled to 4.27, GFR 10, BUN 57. Concern for endovascular leak vs ischemia. Discussed with Vascular Surgery who recommend serial LA, LFT's, non-con CT A/P, renal artery Us with dopplers, Abdominal aorta US with dopplers, IVF.   - Non-contrast CT A/P  - Renal artery, and abdominal aorta US with dopplers  - Add lipase  - Serial LA  - CBC, CMP, CRP, LA in am   - Type and screen   - Vascular Surgery consult- appreciate recommendations and assistance   - Pulse ox  - Low dose oxycodone for pain. Per patient's daughter, she cannot tolerate tylenol d/t lupus and no ibuprofen d/t renal function  - IVF  - Further recommendations pending imaging as above   - Repeat BMP tonight  - NPO for now  - Q4h Vs  - Continue to closely monitor     # Acute Bilateral LE ischemia  following recent endovascular repair:  Patient's daughter reports progressive bilateral skin changes on patients tips of toes. With recent endovascular repair, concern for thromboembolic event. Tips of bilateral Great toe with dime sized areas of eschar with erythema/violaceous extension to the mid-proximal phalynx. + bilateral pulses. Vascular surgery consulted.   - Dopplers q6h  - Vascular surgery consult  - Abdominal US with dopplers to eval for intraabdominal  ischemia     # SANDRO on CKD: Cr on admission 4.27, BUN 57, GFR 10. BL appears ~ 2.2-2.5, though limited hx since 10/6/17 on recent admission. Patient appears mildly hypovolemic on admission. Also consider complication from recent endovascular repair (thrombi).   - UA  - Avoid hypotension, dehydration, and nephrotoxins  - IVF  - Further evaluation per Vascular Surgery  - Consider Nephrology consult if not improving  - Renally dose medications     # Recent CVA: following recent endovascular repair. Occipital and bilateral inferior cerebellar infarctions. Residual deficits in memory and comprehension. Patients son Valerie is POA for consent.  - Fall precautions    # COPD: No PTA O2 use. No cough, sob, or sputum production reported. PTA albuterol inhaler. Faint crackles at bilateral base.   - Will check CXR  - Continue PTA albuterol    # CAD: ECHO 10/14/17 with EF 55-60% NRWMA, though poor acoustic windows. PTA daily ASA and statin, though .   - Did not order ASA tonight    # DMII: No A1c on file. BG elevated on admission to 180's. No PTA medications.   - BG checks q4h overnight  - MSSI q4h tonight  - Hypoglycemic protocol    Diet:  NPO   Fluids: NS bolus with maintainence  DVT Prophylaxis: Pneumatic Compression Devices for tonight  Code Status: DNR/DNI    Disposition Plan   Expected discharge: 2 - 3 days; recommended to prior living arrangement once Pending Vascular Surgery consult and recommendations.     Entered: Alanis Khanh Guevara 11/16/2017, 9:22 PM    Information in the above section will display in the discharge planner report.    The patient's care was discussed with the Attending Physician, Dr. Lockwood.    Alansi Guevara PA-C  Internal Medicine Hospitalist Service  [facility]  Pager: [phone]    Please see sticky note for cross cover information  ______________________________________________________________________    Chief Complaint   Abdominal pain, LE pain and discoloration, fatigue    History is obtained from the patients daughter and EMR as patient poor historian    History of Present Illness   Sandy Sheffield is a 85 year old female with PMH as outlined above who presented in transfer from Assisted lining with family reports of 1 week hx of worsening abdominal pain, fatigue, decreased mobility from recent BL, and bilateral LE skin changes and pain. Patients daughter reports that prior to recent admission for AAA on 10/6/17 patient resided in Adult  setting and had BL mobility and memory deficits, though following AAA repair and CVA, patient noted to be more fatigued, disoriented, and immobile. Patient d/c to TCU 10/17/17 for therapy, but was unable to progress with therapy and was recommended to assisted living. For the past week, patient is reported to have had decline in functional status, increase in abdominal pain, and LE skin color changes and pain. She was started on low dose oxycodone in the last few days with mild improvement in sx, though patient still with episodes of uncontrolled, intermittent, sharp abdominal pain localized to the midline. Bilateral LE toes with reports of progressive darkening of the tips of toes, and increased pain over the past few weeks. Patient noted to have slight memory deficits prior to recent admission, though significantly worse since surgery and CVA. Patient reported to have good appetite.     Currently, patient with noted intermittent abdominal pain and confusion.     Patient does not  endorse: headaches, changes in vision, chest pain, palpitations, upper respiratory symptoms of rhinorrhea or congestion, shortness of breath, cough, sputum production, wheezing, nausea, emesis, constipation, diarrhea, dysuria, edema, rashes, focal neurologic deficits, fever, chills.              Review of Systems   The 10 point Review of Systems is negative other than noted in the HPI or here.     Past Medical History    I have reviewed this patient's medical history and updated it with pertinent information if needed.   Past Medical History:   Diagnosis Date     AAA (abdominal aortic aneurysm) (H) 10/07/2017     SANDRO (acute kidney injury) (H)      SANDRO (acute kidney injury) (H)      CAD (coronary artery disease)      COPD (chronic obstructive pulmonary disease) (H)      CVA (cerebral vascular accident) (H) 10/2017     DMII (diabetes mellitus, type 2) (H)      Gastric hemorrhage 02/2013     HLD (hyperlipidemia)      HTN (hypertension)        Past Surgical History   I have reviewed this patient's surgical history and updated it with pertinent information if needed.  Past Surgical History:   Procedure Laterality Date     ENDOVASCULAR REPAIR ANEURYSM ABDOMINAL AORTA N/A 10/7/2017    Procedure: ENDOVASCULAR REPAIR ANEURYSM ABDOMINAL AORTA;  -Endovascular Repair Abdominal Aortic Aneurysm  with Endorant with Limb Extention x1.  - Aortic Dissection x1   -Revision of left Brachial  Artery Repair   -Arterial limited duplex study;  Surgeon: Laura Casillas MD;  Location:  OR        Social History   Social History   Substance Use Topics     Smoking status: Former Smoker     Types: Cigarettes     Smokeless tobacco: Not on file     Alcohol use No       Family History   I have reviewed this patient's family history and updated it with pertinent information if needed.   Family History   Problem Relation Age of Onset     Hypertension Mother        Prior to Admission Medications   Prior to Admission Medications   Prescriptions Last  Dose Informant Patient Reported? Taking?   ALPRAZolam (XANAX) 0.5 MG tablet   No No   Sig: Take 1 tablet (0.5 mg) by mouth nightly as needed for anxiety (Anxiety)   ASPIRIN PO  Self Yes No   Sig: Take 81 mg by mouth daily   Omega-3 Fatty Acids (FISH OIL BURP-LESS PO)  Self Yes No   Sig: Take 1 capsule by mouth daily   VITAMIN D, CHOLECALCIFEROL, PO  Self Yes No   Sig: Take 2,000 Units by mouth daily    albuterol (PROVENTIL HFA) 108 (90 BASE) MCG/ACT Inhaler   Yes No   Sig: Inhale 2 puffs into the lungs every 6 hours as needed    lidocaine (LIDODERM) 5 % Patch   No No   Sig: Place 2 patches onto the skin every 24 hours   melatonin 3 MG tablet   No No   Sig: Take 1 tablet (3 mg) by mouth nightly as needed for sleep   rosuvastatin (CRESTOR) 40 MG tablet   No No   Sig: Take 1 tablet (40 mg) by mouth daily   scopolamine (TRANSDERM) 72 hr patch   No No   Sig: Place 1 patch onto the skin every 72 hours      Facility-Administered Medications: None     Allergies   Allergies   Allergen Reactions     Ibuprofen GI Disturbance     History of ulcers     Penicillins Nausea and Vomiting     Shrimp Nausea and Vomiting     Tylenol [Acetaminophen] Unknown     Exacerbates her lupus       Physical Exam   Vital Signs: Temp: 99.2  F (37.3  C) Temp src: Oral BP: 104/59 Pulse: 94   Resp: 18 SpO2: 95 % O2 Device: None (Room air)    Weight: 0 lbs 0 oz      Physical Exam   Constitutional: Chronically ill-appearing female lying in bed. Memory deficits. Family at bedside. Intermittent distress noted.       HEENT:   Head: Normocephalic and atraumatic.   Eyes: Conjunctivae are normal. Pupils are equal, round, and reactive to light.  Pharynx has no erythema or exudate, mucous membranes are moist  Neck:   No adenopathy, no bony tenderness  Cardiovascular: Regular rate and rhythm without murmurs or gallops  Pulmonary/Chest: Bilateral basilar rales. no wheezes or retractions. No respiratory distress.  GI: Soft with good bowel sounds. Moderate  tenderness at midline. Periumbilical hernia- reducible. no guarding, no rebound, no peritoneal signs.   Back:  No bony or CVA tenderness   Musculoskeletal:  No edema or clubbing   Skin: Skin is warm and dry. No rash noted to exposed skin areas.   Neurological: Oriented to self.   Psychiatric: poor attention, intermittent agitation      Data   Data reviewed today: I reviewed all medications, new labs and imaging results over the last 24 hours. I personally reviewed recent images, progress notes, labs,     Data     Recent Labs  Lab 11/16/17  1756 11/16/17  1714   WBC  --  11.3*   HGB  --  9.7*   MCV  --  86   PLT  --  218   INR 1.20*  --    NA  --  132*   POTASSIUM  --  3.1*   CHLORIDE  --  100   CO2  --  24   BUN  --  57*   CR  --  4.27*   ANIONGAP  --  9   OKSANA  --  8.6   GLC  --  184*   ALBUMIN  --  2.4*   PROTTOTAL  --  7.0   BILITOTAL  --  0.4   ALKPHOS  --  45   ALT  --  19   AST  --  24     Physician Attestation    I, Kam Lockwood, saw patient  as part of a shared visit.  I have reviewed and discussed with the advanced practice provider their history, physical and plan.    I personally reviewed the vital signs, medications, labs and imaging .    Above mentioned PMH, recent AAA repair now admitted with sub acute abdominal pain found to have:  1. SANDRO on CKD. Likely  pre-renal from poor intake. I am concerned about  renal artery emboli/infarct. Case was dicussed with vascular and radiology. We will acquire non contrast CT to eval for AAA repair graft. Renal ultrasound with doppler to eval for  Possible renal  infarct. Will hydrate and recheck Kidney function, strict I&O. Family has been updated.   2. Bilateral Big toe ischemia. Patient has good DP pulses. This is likely  from small emboli post surgery. This has been discussed with vascular and will continue to follow. Will treat UTI. Follow fever curve and low threshold to broaden antibiotics given resent graft.    VS: B/P: 104/59, T: 99.2, P: 94, R: 18  Exam:  CTAB, abd soft, mild epigastric tenderness, no rebound or guarding , RRR, no m/r/g, no pedal edema mathieu  Palpable DP pulse. Dark.dry gangrene bilateral big toe.     Kam Lockwood  Date of Service (when I saw the patient): 11/16/17

## 2017-11-16 NOTE — IP AVS SNAPSHOT
[address] [address]    Phone:  [phone]                                       After Visit Summary   11/16/2017    Sandy Sheffield    MRN: [id number]           After Visit Summary Signature Page     I have received my discharge instructions, and my questions have been answered. I have discussed any challenges I see with this plan with the nurse or doctor.    ..........................................................................................................................................  Patient/Patient Representative Signature      ..........................................................................................................................................  Patient Representative Print Name and Relationship to Patient    ..................................................               ................................................  Date                                            Time    ..........................................................................................................................................  Reviewed by Signature/Title    ...................................................              ..............................................  Date                                                            Time

## 2017-11-16 NOTE — IP AVS SNAPSHOT
` ` Patient Information     Patient Name Sex     Sandy Jackson ([id number]) Female 1932       Room Bed    7406 7406-01      Patient Demographics     Address Phone    [address] [facility] [address] [phone] (Home)      Patient Ethnicity & Race     Ethnic Group Patient Race    American White      Emergency Contact(s)     Name Relation Home Work Mobile    Félix Jackson(POA) Son [phone]      Mary Strickland Daughter [phone]      Nahid Jackson Son [phone]      GRETCHEN JACKSON Son [phone]        Documents on File        Status Date Received Description       Documents for the Patient    Affiliate Privacy placeholder   phase3    Consent for EHR Access Received 10/10/17     Insurance Card       External Medication Information Consent       Patient ID       [facility] Specified Other       Consent for Services/Privacy Notice - Hospital/Clinic Received 10/10/17     Privacy Notice - [place] Received 10/10/17     Consent for Services - [facility]       Care Everywhere Prospective Auth Received 17     HIM ROSARIO Authorization  17     Advance Directives and Living Will Not Received  Invalid Directive dated 2017 received    HIM ROSARIO Authorization  17 [facility]YS[facility]/ [facility]       Documents for the Encounter    Photograph   Bilateral great toes    CMS IM for Patient Signature Received 17     EMS/Ambulance Record  17 [facility] MEDICAL TRANSPORTATION      Admission Information     Attending Provider Admitting Provider Admission Type Admission Date/Time    Jace Hernández MD Torok, Haruka Matsubara, MD Urgent 17  1609    Discharge Date Hospital Service Auth/Cert Status Service Area     Internal Medicine [facility] SERVICES    Unit Room/Bed Admission Status       [facility] 7406/7406 Admission (Confirmed)       Admission     Complaint    Failure to Thrive, Failure to thrive (0-17), Failure to thrive (0-17), Ischemic ulcer of lower extremity (H),  Constipation      Hospital Account     Name Acct ID Class Status Primary Coverage    Sandy Sheffield [id number] Inpatient Open MEDICARE - MEDICARE PT A ONLY            Guarantor Account (for Hospital Account #[id number])     Name Relation to Pt Service Area Active? Acct Type    Sandy Sheffield Self FCS Yes Personal/Family    Address Phone          [address] [place], [place] [id number] [phone](H)              Coverage Information (for Hospital Account #[id number])     1. MEDICARE/MEDICARE PT A ONLY     F/O Payor/Plan Precert #    MEDICARE/MEDICARE PT A ONLY     Subscriber Subscriber #    Sandy Sheffield [id number]    Address Phone    [employer] CLAIMS  [address]  [place] [address] [phone]          2. [employer] PRO[employer]     F/O Payor/Plan Precert #    [employer]EE PROGRAM     Subscriber Subscriber #    Sandy Sheffield [id number]    Address Phone    [address] [address]  [place], [place] [id number] [phone]          3. [employer]     F/O Payor/Plan Precert #    [employer]     Subscriber Subscriber #    Sandy Sheffield JATINDER [id number]    Address Phone    [employer] CLAIM[employer] PROCESSING  [address] [address]  [place] [address] [phone]

## 2017-11-16 NOTE — IP AVS SNAPSHOT
` `     [address] [facility] BANK: [phone]            Medication Administration Report for Sandy Sheffield as of 11/27/17 1057   Legend:    Given Hold Not Given Due Canceled Entry Other Actions    Time Time (Time) Time  Time-Action       Inactive    Active    Linked        Medications 11/21/17 11/22/17 11/23/17 11/24/17 11/25/17 11/26/17 11/27/17    albuterol (PROAIR HFA/PROVENTIL HFA/VENTOLIN HFA) Inhaler 2 puff  Dose: 2 puff Freq: 4 TIMES DAILY PRN Route: IN  PRN Reason: other  PRN Comment: dyspnea  Start: 11/17/17 1134              albuterol neb solution 2.5 mg  Dose: 2.5 mg Freq: EVERY 6 HOURS PRN Route: NEBULIZATION  PRN Reasons: wheezing,shortness of breath / dyspnea  Start: 11/17/17 2028              bisacodyl (DULCOLAX) EC tablet 15 mg  Dose: 15 mg Freq: DAILY PRN Route: PO  PRN Reason: constipation  Start: 11/16/17 1713   Admin Instructions: Do not crush or chew. Swallow whole. May titrate 1 tablet each day until response. Maximum of 3 tablets daily. Hold for loose stools. This is the first step of a three step constipation treatment.               glucose 40 % gel 15-30 g  Dose: 15-30 g Freq: EVERY 15 MIN PRN Route: PO  PRN Reason: low blood sugar  Start: 11/16/17 1950   Admin Instructions: Give 15 g for BG 51 to 69 mg/dL IF patient is conscious and able to swallow. Give 30 g for BG less than or equal to 50 mg/dL IF patient is conscious and able to swallow. Do NOT give glucose gel via enteral tube.  IF patient has enteral tube: give apple juice 120 mL (4 oz or 15 g of CHO) via enteral tube for BG 51 to 69 mg/dL.  Give apple juice 240 mL (8 oz or 30 g of CHO) via enteral tube for BG less than or equal to 50 mg/dL.    ~Oral gel is preferable for conscious and able to swallow patient.   ~IF gel unavailable or patient refuses may provide apple juice 120 mL (4 oz or 15 g of CHO). Document juice on I and O flowsheet.              Or  dextrose 50 % injection 25-50 mL  Dose: 25-50 mL Freq: EVERY 15 MIN PRN  Route: IV  PRN Reason: low blood sugar  Start: 11/16/17 1950   Admin Instructions: Use if have IV access, BG less than 70 mg/dL and meet dose criteria below:  Dose if conscious and alert (or disorientated) and NPO = 25 mL  Dose if unconscious / not alert = 50 mL  Vesicant. For ordered doses up to 25 mg, give IV Push undiluted. Give each 5g over 1 minute.              Or  glucagon injection 1 mg  Dose: 1 mg Freq: EVERY 15 MIN PRN Route: SC  PRN Reason: low blood sugar  PRN Comment: May repeat x 1 only  Start: 11/16/17 1950   Admin Instructions: May give SQ or IM. ONLY use glucagon IF patient has NO IV access AND is UNABLE to swallow AND blood glucose is LESS than or EQUAL to 50 mg/dL.  Give IV Push over 1 minute. Reconstitute with 1mL sterile water.               heparin lock flush 10 UNIT/ML injection 5-10 mL  Dose: 5-10 mL Freq: EVERY 1 HOUR PRN Route: IK  PRN Reason: other  PRN Comment: to lock each dormant lumen of peripheral midline IV catheter lumen. MAX: 5 mL per lumen.  Start: 11/21/17 1211   Admin Instructions: MAX: 5 mL per lumen.               heparin lock flush 10 UNIT/ML injection 5-10 mL  Dose: 5-10 mL Freq: EVERY 24 HOURS Route: IK  Start: 11/21/17 1215   Admin Instructions: To lock each dormant lumen of peripheral midline IV catheter lumen. Check PRN heparin flush order to see when last dose of PRN heparin was given before administering.   MAX: 5 mL per lumen.     (1409)-Not Given        (1229)-Not Given        (1252)-Not Given        (1125)-Not Given        (1125)-Not Given        1354 (5 mL)-Given        [ ] 1215           HYDROmorphone (STANDARD CONC) (DILAUDID) liquid 1-2 mg  Dose: 1-2 mg Freq: EVERY 3 HOURS PRN Route: PO  PRN Reason: moderate to severe pain  Start: 11/26/17 1503         2111 (2 mg)-Given            lidocaine (LMX4) kit  Freq: EVERY 1 HOUR PRN Route: Top  PRN Reason: moderate pain  PRN Comment: with VAD insertion or accessing implanted port.  Start: 11/20/17 1958   Admin  "Instructions: Do NOT give if patient has a history of allergy to any local anesthetic or any \"giselle\" product. Apply 30 minutes prior to VAD insertion or port access.   MAX Dose: 2.5 g (  of 5 g tube)                                lidocaine 1 % 0.5-5 mL  Dose: 0.5-5 mL Freq: EVERY 1 HOUR PRN Route: OTHER  PRN Comment: mild pain with VAD insertion or accessing implanted port.  Start: 11/20/17 1958   Admin Instructions: Do NOT give if patient has a history of allergy to any local anesthetic or any \"giselle\" product. MAX dose 1 mL subcutaneous OR intradermal in divided doses.               melatonin tablet 3 mg  Dose: 3 mg Freq: AT BEDTIME Route: PO  Start: 11/20/17 2200    (2116)-Not Given [C]        (2124)-Not Given        (2126)-Not Given        (2158)-Not Given        (2231)-Not Given        2110 (3 mg)-Given        [ ] 2200           naloxone (NARCAN) injection 0.1-0.4 mg  Dose: 0.1-0.4 mg Freq: EVERY 2 MIN PRN Route: IV  PRN Reason: opioid reversal  Start: 11/18/17 1112   Admin Instructions: For respiratory rate LESS than or EQUAL to 8.  Partial reversal dose:  0.1 mg titrated q 2 minutes for Analgesia Side Effects Monitoring Sedation Level of 3 (frequently drowsy, arousable, drifts to sleep during conversation).Full reversal dose:  0.4 mg bolus for Analgesia Side Effects Monitoring Sedation Level of 4 (somnolent, minimal or no response to stimulation).  Give IV Push undiluted up to 2mg. Give each 0.4mg over 15 seconds in emergency situations. For non-emergent situations further dilute in 9mL of NS to facilitate titration of response.               OLANZapine (zyPREXA) injection 5 mg  Dose: 5 mg Freq: ONCE PRN Route: IM  PRN Reason: agitation  Start: 11/25/17 0155   Admin Instructions: Please attempt PO first  Dissolve the contents of the 10 mg vial using 2.1 mL of Sterile Water for Injection to provide a solution containing 5 mg/mL of olanzapine. Withdraw the ordered dose from vial. Use immediately (within 1 hour) " after reconstitution. Discard any unused portion.               OLANZapine zydis (zyPREXA) ODT tab 5 mg  Dose: 5 mg Freq: AT BEDTIME Route: PO  Start: 11/21/17 2200   Admin Instructions: Combined IM and PO doses may significantly increase the risk of orthostatic hypotension at 30 mg per day or higher.  With dry hands, peel back foil backing and gently remove tablet; do not push oral disintegrating tablet through foil backing; administer immediately on tongue and oral disintegrating tablet dissolves in seconds; then swallow with saliva; liquid not required.     2240 (5 mg)-Given        2127 (5 mg)-Given        2140 (5 mg)-Given         0104 (5 mg)-Given       (2321)-Not Given        2111 (5 mg)-Given        [ ] 2200           OLANZapine zydis (zyPREXA) ODT tab 5 mg  Dose: 5 mg Freq: 2 TIMES DAILY PRN Route: PO  PRN Reasons: agitation,other  PRN Comment: Delirium; 1st line therapy  Start: 11/21/17 1041   Admin Instructions: Combined IM and PO doses may significantly increase the risk of orthostatic hypotension at 30 mg per day or higher.  With dry hands, peel back foil backing and gently remove tablet; do not push oral disintegrating tablet through foil backing; administer immediately on tongue and oral disintegrating tablet dissolves in seconds; then swallow with saliva; liquid not required.     1704 (5 mg)-Given          (0039)-Not Given       1426 (5 mg)-Given       2012 (5 mg)-Given              ondansetron (ZOFRAN-ODT) ODT tab 4 mg  Dose: 4 mg Freq: EVERY 6 HOURS PRN Route: PO  PRN Reasons: nausea,vomiting  Start: 11/16/17 1711   Admin Instructions: This is Step 1 of nausea and vomiting management.  If nausea not resolved in 15 minutes, go to Step 2 prochlorperazine (COMPAZINE). Do not push through foil backing. Peel back foil and gently remove. Place on tongue immediately. Administration with liquid unnecessary                      senna-docusate (SENOKOT-S;PERICOLACE) 8.6-50 MG per tablet 1 tablet  Dose: 1  tablet Freq: 2 TIMES DAILY PRN Route: PO  PRN Reason: constipation  Start: 11/16/17 1713   Admin Instructions: If no bowel movement in 24 hours, increase to 2 tablets PO.  Hold for loose stools.                     Or  senna-docusate (SENOKOT-S;PERICOLACE) 8.6-50 MG per tablet 2 tablet  Dose: 2 tablet Freq: 2 TIMES DAILY PRN Route: PO  PRN Reason: constipation  Start: 11/16/17 1713   Admin Instructions: Hold for loose stools.      1220 (2 tablet)-Given               Discontinued Medications  Medications 11/21/17 11/22/17 11/23/17 11/24/17 11/25/17 11/26/17 11/27/17         Dose: 5 mg Freq: DAILY PRN Route: PO  PRN Reason: constipation  Start: 11/16/17 1713   End: 11/26/17 1152   Admin Instructions: Do not crush or chew. Swallow whole. May titrate 1 tablet each day until response. Maximum of 3 tablets daily. Hold for loose stools. This is the first step of a three step constipation treatment.          1152-Med Discontinued       Or    Dose: 10 mg Freq: DAILY PRN Route: PO  PRN Reason: constipation  Start: 11/16/17 1713   End: 11/26/17 1152   Admin Instructions: Do not crush or chew. Swallow whole. May titrate 1 tablet each day until response. Maximum of 3 tablets daily. Hold for loose stools. This is the first step of a three step constipation treatment.          1152-Med Discontinued          Rate: 75 mL/hr Freq: CONTINUOUS Route: IV  Start: 11/18/17 1845   End: 11/25/17 2016    0215 ( )-Restarted       1353 ( )-New Bag        0352 ( )-New Bag        0058 ( )-New Bag       0907 ( )-New Bag       1319 ( )-New Bag        0659 ( )-Rate/Dose Verify       1500 ( )-Rate/Dose Verify       1701 ( )-New Bag        2016-Med Discontinued           Dose: 1 mg Freq: EVERY 6 HOURS PRN Route: IV  PRN Reason: agitation  PRN Comment: 2nd line therapy  Start: 11/21/17 1041   End: 11/25/17 0804   Admin Instructions: For ordered doses up to 5 mg, give IV Push undiluted over 1 minute.         0804-Med Discontinued           Dose: 2 mg  Freq: EVERY 3 HOURS PRN Route: PO  PRN Reason: moderate to severe pain  Start: 11/26/17 1151   End: 11/26/17 1503   Admin Instructions: Please crush in apple sauce and give to patient.          [ ]  1448 Incomplete       1503-Med Discontinued          Dose: 0.3 mg Freq: EVERY 4 HOURS PRN Route: IV  PRN Reason: moderate to severe pain  Start: 11/21/17 1040   End: 11/26/17 1151   Admin Instructions: Give IV Push undiluted up to 4 mg. Each 2mg over 2-5 minutes.     2123 (0.3 mg)-Given        1844 (0.3 mg)-Given        0147 (0.3 mg)-Given       1642 (0.3 mg)-Given       2116 (0.3 mg)-Given        0108 (0.3 mg)-Given       2036 (0.3 mg)-Given        0152 (0.3 mg)-Given       0728 (0.3 mg)-Given       1750 (0.3 mg)-Given       2237 (0.3 mg)-Given        0252 (0.3 mg)-Given       1151-Med Discontinued          Dose: 1-6 Units Freq: EVERY 4 HOURS Route: SC  Start: 11/16/17 2000   End: 11/25/17 0805   Admin Instructions: Correction Scale - MEDIUM INSULIN RESISTANCE DOSING     Do Not give Correction Insulin if BG less than 140.  For  - 189 give 1 unit.  For  - 239 give 2 units.  For  - 289 give 3 units.  For  - 339 give 4 units.  For  - 399 give 5 units.  For BG greater than or equal to 400 give 6 units.  Check blood glucose Q4H and administer based on blood glucose.  Notify provider if glucose greater than or equal to 350 mg/dL after administration of correction dose.  If given at mealtime, must be administered 5 min before meal or immediately after.     (0005)-Not Given [C]       (0400)-Not Given [C]       (0814)-Not Given       (1213)-Not Given       1631 (1 Units)-Given       (2116)-Not Given        (0042)-Not Given [C]       (0407)-Not Given [C]       (0943)-Not Given [C]       (1229)-Not Given [C]       (1822)-Not Given [C]       (2056)-Not Given [C]        0121 (1 Units)-Given [C]       (0436)-Not Given [C]       (0840)-Not Given       (1252)-Not Given       (1642)-Not Given [C]        (2026)-Not Given [C]        (0000)-Not Given [C]       (0440)-Not Given       (0756)-Not Given       (1157)-Not Given       (1738)-Not Given [C]       (2112)-Not Given [C]        (0130)-Not Given [C]       (0400)-Not Given [C]       (0747)-Not Given       0805-Med Discontinued           Dose: 1 patch Freq: EVERY 24 HOURS 2000 Route: TD  Start: 11/20/17 2000   End: 11/26/17 1423   Admin Instructions: Apply patch(s) to back. To prevent lidocaine toxicity, patient should be patch free for 12 hrs daily. Patches may be cut to smaller size prior to removing release liner.  NEVER APPLY HEAT OVER PATCH which will increase absorption and may lead to risk of local anesthetic toxicity. Do not apply over area where liposomal bupivacaine was injected for 96 hours post injection.     0826 (1 patch)-Given [C]               (2202)-Not Given        (0840)-Not Given        (0757)-Not Given [C]        (0747)-Not Given [C]        (0741)-Not Given       1423-Med Discontinued       And    Freq: EVERY 24 HOURS 0800 Route: TD  Start: 11/21/17 0800   End: 11/26/17 1423   Admin Instructions: Remove lidocaine Patch.     1017 ( )-Patch in Place       2228 ( )-Patch Removed        0746 ( )-Negative [C]        (0840)-Not Given        (0757)-Not Given [C]        (0748)-Not Given [C]        (0741)-Not Given       1423-Med Discontinued       And    Freq: EVERY 8 HOURS Route: TD  Start: 11/20/17 2000   End: 11/26/17 1423   Admin Instructions: Chart every shift, confirming that patch is still in place on patient (no barcode scan needed). See patch order for dose information.  NEVER APPLY HEAT OVER PATCH which will increase absorption and may lead to risk of local anesthetic toxicity. Do not apply over area where liposomal bupivacaine injected for 96 hours.     (0400)-Not Given [C]       1354 ( )-Patch in Place       2228 ( )-Negative        0408 ( )-Negative       1230 ( )-Negative [C]       2003 ( )-Given [C]               (1252)-Not Given      "  (1950)-Not Given [C]        (0443)-Not Given [C]       (1124)-Not Given       2112 ( )-Negative [C]               (1105)-Not Given       (2122)-Not Given               (1129)-Not Given       1423-Med Discontinued          Freq: EVERY 1 HOUR PRN Route: Top  PRN Reason: moderate pain  PRN Comment: with VAD insertion or accessing implanted port.  Start: 11/18/17 0958   End: 11/24/17 1303   Admin Instructions: Do NOT give if patient has a history of allergy to any local anesthetic or any \"giselle\" product. Apply 30 minutes prior to VAD insertion or port access.   MAX Dose: 2.5 g (  of 5 g tube)                         1303-Med Discontinued            Freq: EVERY 1 HOUR PRN Route: Top  PRN Reason: pain  PRN Comment: with VAD insertion or accessing implanted port.  Start: 11/16/17 1958   End: 11/24/17 1303   Admin Instructions: Do NOT give if patient has a history of allergy to any local anesthetic or any \"giselle\" product.   Apply 30 minutes prior to VAD insertion or port access.  MAX Dose:  2.5 g (  of 5 g tube)        1303-Med Discontinued            Dose: 0.5-5 mL Freq: EVERY 1 HOUR PRN Route: OTHER  PRN Comment: mild pain with VAD insertion or accessing implanted port.  Start: 11/18/17 0958   End: 11/24/17 1303   Admin Instructions: Do NOT give if patient has a history of allergy to any local anesthetic or any \"giselle\" product. MAX dose 1 mL subcutaneous OR intradermal in divided doses.        1303-Med Discontinued            Dose: 1 mL Freq: EVERY 1 HOUR PRN Route: OTHER  PRN Comment: mild pain with VAD insertion or accessing implanted port  Start: 11/16/17 1958   End: 11/24/17 1303   Admin Instructions: Do NOT give if patient has a history of allergy to any local anesthetic or any \"giselle\" product. MAX dose 1 mL subcutaneous OR intradermal in divided doses.        1303-Med Discontinued            Dose: 4 mg Freq: EVERY 6 HOURS PRN Route: IV  PRN Reasons: nausea,vomiting  Start: 11/16/17 1711   End: 11/26/17 1152 "   Admin Instructions: This is Step 1 of nausea and vomiting management.  If nausea not resolved in 15 minutes, go to Step 2 prochlorperazine (COMPAZINE).  Irritant. For ordered doses up to 4 mg, give IV Push undiluted over 2-5 minutes.       0447 (4 mg)-Given          1152-Med Discontinued          Dose: 2.25 g Freq: EVERY 8 HOURS SCHEDULED Route: IV  Indications of Use: INTRA-ABDOMINAL INFECTION  Indications Comment: AAA graft infection  Start: 11/17/17 1500   End: 11/25/17 1441   Admin Instructions: Give IVP over 3 minutes     0230 (2.25 g)-Given       1012 (2.25 g)-Given       1705 (2.25 g)-Given        0034 (2.25 g)-Given       0943 (2.25 g)-Given       1831 (2.25 g)-Given        0135 (2.25 g)-Given       0908 (2.25 g)-Given       1646 (2.25 g)-Given        0108 (2.25 g)-Given       1004 (2.25 g)-Given       1740 (2.25 g)-Given        0106 (2.25 g)-Given       0903 (2.25 g)-Given       1441-Med Discontinued           Dose: 0.5 mL Freq: PRIOR TO DISCHARGE Route: IM  Start: 11/18/17 1000   End: 11/26/17 1152   Admin Instructions: Administer when afebrile (LESs than 100.4 ) x 24 hr OR prior to discharge.   Give Fact Sheet to Patient.  If patient is febrile, reassess in 8 hrs or every shift. Minor illnesses with or without fever does NOT contraindicate the vaccination. Inject into the anterolateral aspect of the thigh (infants) or deltoid muscle (toddlers/children); do not inject in the gluteal area, or in areas of major nerve trunks and/or blood vessels. IM administration for children 6 months to 2 years should be vaccinated in the anterolateral aspect of thigh.  If not administering when scheduled , change the due time by following the instructions in the reference link below. If patient refuses vaccine, chart as Vaccine Refused.                   1152-Med Discontinued          Dose: 20-40 mEq Freq: EVERY 2 HOURS PRN Route: ORAL OR FEED  PRN Reason: potassium supplementation  Start: 11/17/17 0737   End: 11/25/17  1453   Admin Instructions: Use if unable to tolerate tablets.  If Serum K+ 3.0-3.3, dose = 60 mEq po total dose (40 mEq x1 followed in 2 hours by 20 mEq x1). Recheck K+ level 4 hours after dose and the next AM.  If Serum K+ 2.5-2.9, dose = 80 mEq po total dose (40 mEq Q2H x2). Recheck K+ level 4 hours after dose and the next AM.  If Serum K+ less than 2.5, See IV order.  Dissolve packet contents in 4-8 ounces of cold water or juice.         1453-Med Discontinued           Dose: 10 mEq Freq: EVERY 1 HOUR PRN Route: IV  PRN Reason: potassium supplementation  Last Dose: Stopped (11/25/17 1437)  Start: 11/17/17 0737   End: 11/25/17 1453   Admin Instructions: Infuse via PERIPHERAL LINE. Use potassium with lidocaine for pain with peripheral administration.  If Serum K+ 3.0-3.3, dose = 10 mEq/hr x4 doses (40 mEq IV total dose). Recheck K+ level 2 hours after dose and the next AM.  If Serum K+ less than 3.0, dose = 10 mEq/hr x6 doses (60 mEq IV total dose). Recheck K+ level 2 hours after dose and the next AM.      1203 (10 mEq)-New Bag       1330 (10 mEq)-New Bag       1443 (10 mEq)-New Bag       1956 (10 mEq)-New Bag          1402 (10 mEq)-New Bag       1437-Stopped       1453-Med Discontinued           Dose: 10 mEq Freq: EVERY 1 HOUR PRN Route: IV  PRN Reason: potassium supplementation  Start: 11/17/17 0737   End: 11/25/17 1453   Admin Instructions: Infuse via PERIPHERAL LINE or CENTRAL LINE. Use for central line replacement if patient weight less than 65 kg, if patient is on TPN with high potassium content or if unit does not stock 20 mEq bags.   If Serum K+ 3.0-3.3, dose = 10 mEq/hr x4 doses (40 mEq IV total dose). Recheck K+ level 2 hours after dose and the next AM.   If Serum K+ less than 3.0, dose = 10 mEq/hr x6 doses (60 mEq IV total dose). Recheck K+ level 2 hours after dose and the next AM.         1453-Med Discontinued           Dose: 20-40 mEq Freq: EVERY 2 HOURS PRN Route: PO  PRN Reason: potassium  supplementation  Start: 11/17/17 0737   End: 11/25/17 1453   Admin Instructions: Use if able to take PO.   If Serum K+ 3.0-3.3, dose = 60 mEq po total dose (40 mEq x1 followed in 2 hours by 20 mEq x1). Recheck K+ level 4 hours after dose and the next AM.  If Serum K+ 2.5-2.9, dose = 80 mEq po total dose (40 mEq Q2H x2). Recheck K+ level 4 hours after dose and the next AM.  If Serum K+ less than 2.5, See IV order.  DO NOT CRUSH.         1453-Med Discontinued           Dose: 10 mL Freq: EVERY 8 HOURS Route: IK  Start: 11/21/17 1215   End: 11/26/17 1423   Admin Instructions: To lock each peripheral midline IV catheter dormant lumen. Recommended to flush Q8H each lumen even during infusions.     (1410)-Not Given [C]       (1933)-Not Given        (0408)-Not Given       (1346)-Not Given [C]       (1956)-Not Given               0736 (20 mL)-Given       (1253)-Not Given       (1950)-Not Given        (0443)-Not Given       (1124)-Not Given       (2112)-Not Given        (0554)-Not Given       (1125)-Not Given       (2119)-Not Given        (0457)-Not Given       (1130)-Not Given       1423-Med Discontinued          Dose: 10 mL Freq: EVERY 8 HOURS Route: IK  Start: 11/20/17 2000   End: 11/26/17 1423   Admin Instructions: And Q1H PRN, to lock peripheral midline IV catheter dormant lumen. Recommended to flush Q8H each lumen even during infusions     (0658)-Not Given       (1410)-Not Given [C]       (1933)-Not Given        (0408)-Not Given       (1327)-Not Given       (2002)-Not Given        0453 (10 mL)-Given       (1253)-Not Given       (1951)-Not Given        (0443)-Not Given       0741 (20 mL)-Given       (1124)-Not Given       (2113)-Not Given        (0554)-Not Given       (1106)-Not Given       (2120)-Not Given        (0457)-Not Given       (1130)-Not Given       1423-Med Discontinued          Dose: 10 mL Freq: EVERY 8 HOURS Route: IK  Start: 11/18/17 1300   End: 11/26/17 1423   Admin Instructions: And Q1H PRN, to lock  peripheral midline IV catheter dormant lumen. Recommended to flush Q8H each lumen even during infusions     (0700)-Not Given       1355 (10 mL)-Given       (2116)-Not Given        (0504)-Not Given       (1328)-Not Given       2003 (10 mL)-Given        (0453)-Not Given       (1254)-Not Given       (2026)-Not Given        (0443)-Not Given       (1423)-Not Given       (2113)-Not Given        (0554)-Not Given       (1436)-Not Given       (2120)-Not Given        (0457)-Not Given       1423-Med Discontinued  (1448)-Not Given              Dose: 10 mL Freq: EVERY 8 HOURS Route: IK  Start: 11/18/17 1000   End: 11/26/17 1423   Admin Instructions: And Q1H PRN, to lock peripheral midline IV catheter dormant lumen. Recommended to flush Q8H each lumen even during infusions     (0235)-Not Given       (1411)-Not Given [C]       (1934)-Not Given        (0133)-Not Given       0944 (10 mL)-Given [C]       (1823)-Not Given        (0203)-Not Given       (0906)-Not Given       (2026)-Not Given        (0124)-Not Given       (1123)-Not Given       1740 (10 mL)-Given        (0113)-Not Given       (0944)-Not Given       (2120)-Not Given        (0456)-Not Given       (1130)-Not Given       1423-Med Discontinued          Dose: 10-20 mL Freq: EVERY 1 HOUR PRN Route: IK  PRN Reasons: line flush,post meds or blood draw  PRN Comment: to flush each peripheral midline IV catheter lumen.  Start: 11/21/17 1210   End: 11/25/17 1453   Admin Instructions: 10 mL post IV meds;   20 mL post blood draw.      0819 (30 mL)-Given [C]       2107 (20 mL)-Given          1453-Med Discontinued           Dose: 10-20 mL Freq: EVERY 1 HOUR PRN Route: IK  PRN Reasons: line flush,post meds or blood draw  PRN Comment: to flush each peripheral midline IV catheter lumen  Start: 11/20/17 1958   End: 11/25/17 1453   Admin Instructions: 10 mL post IV meds;  20 mL post blood draw         1453-Med Discontinued           Dose: 10-20 mL Freq: EVERY 1 HOUR PRN Route: IK  PRN  Reasons: line flush,post meds or blood draw  PRN Comment: to flush peripheral midline IV catheter  Start: 11/18/17 1249   End: 11/25/17 1453   Admin Instructions: 10 mL post IV meds;  20 mL post blood draw         1453-Med Discontinued           Dose: 10-20 mL Freq: EVERY 1 HOUR PRN Route: IK  PRN Reasons: line flush,post meds or blood draw  PRN Comment: to flush each peripheral midline IV catheter lumen  Start: 11/18/17 0958   End: 11/25/17 1453   Admin Instructions: 10 mL post IV meds;  20 mL post blood draw         1453-Med Discontinued           Dose: 3 mL Freq: EVERY 8 HOURS Route: IK  Start: 11/16/17 2000   End: 11/26/17 1423   Admin Instructions: And Q1H PRN, to lock peripheral IV dormant line.     (0700)-Not Given       1356 (3 mL)-Given       (2238)-Not Given [C]        (0408)-Not Given [C]       1330 (3 mL)-Given       (2003)-Not Given               1253 (3 mL)-Given       2027 (3 mL)-Given        (0400)-Not Given       (1124)-Not Given       (2113)-Not Given        (0554)-Not Given       (1106)-Not Given       (2120)-Not Given        (0457)-Not Given       (1130)-Not Given       1423-Med Discontinued          Dose: 3 mL Freq: EVERY 1 HOUR PRN Route: IK  PRN Reason: line flush  PRN Comment: for peripheral IV flush post IV meds  Start: 11/16/17 1958   End: 11/25/17 1453        1453-Med Discontinued      Medications 11/21/17 11/22/17 11/23/17 11/24/17 11/25/17 11/26/17 11/27/17

## 2017-11-16 NOTE — IP AVS SNAPSHOT
"    [address] [facility]: [phone]                                              INTERAGENCY TRANSFER FORM - PHYSICIAN ORDERS   2017                    Hospital Admission Date: 2017  CHINO JACKSON   : 1932  Sex: Female        Attending Provider: Jace Hernández MD     Allergies:  Ibuprofen, Penicillins, Shrimp, Tylenol [Acetaminophen]    Infection:  None   Service:  INTERNAL MED    Ht:  1.727 m (5' 8\")   Wt:  75.8 kg (167 lb 1.6 oz)   Admission Wt:  83.3 kg (183 lb 11.2 oz)    BMI:  25.41 kg/m 2   BSA:  1.91 m 2            Patient PCP Information     Provider PCP Type    [facility]BS[facility]      ED Clinical Impression     Diagnosis Description Comment Added By Time Added    Failure to thrive (0-17) [R62.51] Failure to thrive (0-17) [R62.51]  Marguerite Nunez NP 2017 10:15 AM    Comfort measures only status [Z51.5] Comfort measures only status [Z51.5]  Marguerite Nunez NP 2017 10:16 AM      Hospital Problems as of 2017              Priority Class Noted POA    Infected aortic graft (H) Medium  2017 Unknown      Non-Hospital Problems as of 2017              Priority Class Noted    AAA (abdominal aortic aneurysm) (H) Medium  10/6/2017    Abdominal aortic aneurysm (H) Medium  10/7/2017    Failure to thrive (0-17) Medium  2017    Ischemic ulcer of lower extremity (H) Medium  2017    Constipation Medium  2017      Code Status History     Date Active Date Inactive Code Status Order ID Comments User Context    2017 10:30 AM  Special Code [id number] Comfort Care Marguerite Nunez NP Outpatient    2017 10:30 AM 2017 10:30 AM DNR/DNI [id number]  Marguerite Nunez NP Outpatient    2017 11:15 AM 2017 10:30 AM DNR/DNI [id number]  Sakshi Ojeda PA-C Inpatient    2017 11:13 AM 2017 11:15 AM Full Code [id number]  Sakshi Ojeda PA-C Inpatient    2017  8:44 PM 2017 11:13 " AM DNR/DNI [id number]  Alanis Guevara PA-C Inpatient    10/17/2017  9:29 AM 11/16/2017  8:44 PM Full Code [id number]  Jessica Moya APRN CNS Outpatient    10/7/2017  7:32 PM 10/17/2017  9:29 AM Full Code [id number]  Dhaval Carcmao MD Inpatient    10/6/2017 10:54 PM 10/7/2017  7:32 PM Full Code [id number]  Jomar Sifuentes MD Inpatient         Medication Review      START taking        Dose / Directions Comments    melatonin 3 MG tablet   Used for:  Failure to thrive (0-17)        Dose:  3 mg   Take 1 tablet (3 mg) by mouth At Bedtime   Quantity:  30 tablet   Refills:  0        * OLANZapine zydis 5 MG ODT tab   Commonly known as:  zyPREXA   Used for:  Comfort measures only status        Dose:  5-10 mg   Take 1-2 tablets (5-10 mg) by mouth 2 times daily as needed for agitation or other (Delirium; 1st line therapy)   Refills:  0        * OLANZapine zydis 5 MG ODT tab   Commonly known as:  zyPREXA   Used for:  Comfort measures only status        Dose:  5 mg   Take 1 tablet (5 mg) by mouth At Bedtime   Refills:  0        * Notice:  This list has 2 medication(s) that are the same as other medications prescribed for you. Read the directions carefully, and ask your doctor or other care provider to review them with you.      CONTINUE these medications which may have CHANGED, or have new prescriptions. If we are uncertain of the size of tablets/capsules you have at home, strength may be listed as something that might have changed.        Dose / Directions Comments    albuterol 108 (90 BASE) MCG/ACT Inhaler   Commonly known as:  PROAIR HFA/PROVENTIL HFA/VENTOLIN HFA   This may have changed:    - when to take this  - reasons to take this   Used for:  Comfort measures only status        Dose:  2 puff   Inhale 2 puffs into the lungs 4 times daily as needed for other (dyspnea)   Refills:  0        ondansetron 4 MG ODT tab   Commonly known as:  ZOFRAN-ODT   This may have changed:    - when to take this  - reasons to take  this   Used for:  Comfort measures only status        Dose:  4 mg   Take 1 tablet (4 mg) by mouth every 6 hours as needed for nausea or vomiting   Quantity:  120 tablet   Refills:  0          STOP taking     ALPRAZOLAM PO           ASPIRIN PO           OXYCODONE HCL PO           polyethylene glycol powder   Commonly known as:  MIRALAX/GLYCOLAX           rosuvastatin 40 MG tablet   Commonly known as:  CRESTOR           scopolamine 72 hr patch   Commonly known as:  TRANSDERM           senna-docusate 8.6-50 MG per tablet   Commonly known as:  SENOKOT-S;PERICOLACE                   Summary of Visit     Reason for your hospital stay       You were admitted to the hospital for abdominal pain and 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  Goals of care have been shifted to comfort measures only.             After Care     Activity - Up ad edi           Advance Diet as Tolerated       Follow this diet upon discharge: Per family request, for comfort       Fall precautions           [place] catheter       To straight gravity drainage. Change catheter every 2 weeks and PRN for leaking or decreased uring output with signs of bladder distention. DO NOT change catheter without a specific MD order IF diagnosis of benign prostatic hypertrophy (BPH), neurogenic bladder, or other urological conditions       General info for SNF       Length of Stay Estimate: Long Term Care  Condition at Discharge: Terminal  Level of care:skilled   Rehabilitation Potential: Poor  Admission H&P remains valid and up-to-date: Yes  Recent Chemotherapy: N/A  Use Nursing Home Standing Orders: Yes             Your next 10 appointments already scheduled     Nov 30, 2017  3:20 PM CST   CT ABDOMEN PELVIS W/O CONTRAST with UCCT2   [facility] ([facility])    [address]  1st Floor  [place] [address] [phone]           Please bring any scans or X-rays taken at other hospitals, if similar tests were done.  Also bring a list of your medicines, including vitamins, minerals and over-the-counter drugs. It is safest to leave personal items at home.  Be sure to tell your doctor:   If you have any allergies.   If there s any chance you are pregnant.   If you are breastfeeding.   If you have any special needs.  You may have contrast for this exam. To prepare:   Do not eat or drink for 2 hours before your exam. If you need to take medicine, you may take it with small sips of water. (We may ask you to take liquid medicine as well.)   The day before your exam, drink extra fluids at least six 8-ounce glasses (unless your doctor tells you to restrict your fluids).  Patients over 70 or patients with diabetes or kidney problems:   If you haven t had a blood test (creatinine test) within the last 30 days, go to your clinic or Diagnostic Imaging Department for this test.  If you have diabetes:   If your kidney function is normal, continue taking your metformin (Avandamet, Glucophage, Glucovance, Metaglip) on the day of your exam.   If your kidney function is abnormal, wait 48 hours before restarting this medicine.  You will have oral contrast for this exam:   You will drink the contrast at home. Get this from your clinic or Diagnostic Imaging Department. Please follow the directions given.  Please wear loose clothing, such as a sweat suit or jogging clothes. Avoid snaps, zippers and other metal. We may ask you to undress and put on a hospital gown.  If you have any questions, please call the Imaging Department where you will have your exam.            Nov 30, 2017  3:45 PM CST   (Arrive by 3:30 PM)   New Vascular Visit with Laura Casillas MD   [facility] ([facility])    [address] [address] [phone]              Follow-Up Appointment Instructions     Future Labs/Procedures    Follow Up and recommended labs and tests     Comments:    Follow up with hospice  team on admission for assessment.      Follow-Up Appointment Instructions     Follow Up and recommended labs and tests       Follow up with hospice team on admission for assessment.             Statement of Approval     Ordered          11/27/17 1051  I have reviewed and agree with all the recommendations and orders detailed in this document.  EFFECTIVE NOW     Approved and electronically signed by:  Marguerite Nunez NP

## 2017-11-16 NOTE — PROGRESS NOTES
[facility]  Transfer Triage Note    Date of call: 11/16/17  Time of call: 9:20 AM    Reason for Transfer:Patient has established care here  Diagnosis: Failure to thrive following recent hospitalization to Vascular surgery (s/p 9cm AAA repair by stent)    Outside Records: Available  Additional records requested to be faxed to [phone].    Stability of Patient: Patient is vitally stable, with no critical labs, and will likely remain stable throughout the transfer process    Expected Time of Arrival for Transfer: 0-8 hours    Recommendations for Management and Stabilization: Not needed    Additional Comments: Direct admission requested by Dr. Casillas vascular surgery. 85F currently in TCU in [place], recent hospitalization in early October 2017 for repair of 9cm AAA w stent. Procedure was considered a high risk, but patient strongly wished to proceed w it to 'live.' Post-op recovery was complicated by small cerebeller stroke w blurred vision, but overall recovering, was walking w assist on hospital discharge, and transferred to TCU. Family contacted Dr. Casillas that she is apparently failing. Further detail unclear at this time. No acute event. Per Dr. Casillas, [facility] surgery can be consulted, almost due for follow up imaging post-stent.   Failure to thrive, unclear etiology at this time, but no acute event. Assign to observation to r/o any acute/reversible etiology.    Joanna Carlson MD

## 2017-11-16 NOTE — IP AVS SNAPSHOT
` `     [address] [facility] [facility]: [phone]                 INTERAGENCY TRANSFER FORM - NOTES (H&P, Discharge Summary, Consults, Procedures, Therapies)   2017                    Hospital Admission Date: 2017  SANDY SHEFFIELD   : 1932  Sex: Female        Patient PCP Information     Provider PCP Type    CORY GOLD ARTHUR General         History & Physicals      H&P by Kam Lockwood MD at 2017  4:59 PM     Author:  Kam Lockwood MD Service:  Internal Medicine Author Type:  Physician    Filed:  2017  3:22 PM Date of Service:  2017  4:59 PM Creation Time:  2017  4:59 PM    Status:  Signed :  Kam Lockwood MD (Physician)         [facility]    Internal Medicine History and Physical - Gold Service       Date of Admission:  2017[MT1.1]    Assessment & Plan[MT1.2]   Sandy Sheffield is a 85 year old female  admitted on 2017. She has a PMH of COPD, Lupus, HLD, CKD, HTN, DMII, CAD, and recent admission to the SICU (10/6-10/17/17) for AAA repair c/b postoperative CVA. Patient admitted from[MT1.1] Assisted living with family reports of one week hx of  worsening abdominal pain, fatigue, and bilateral LE pain and skin changes. Patient admitted to Medicine for for further evaluation and Vascular Surgery consult.[MT1.3]      #[MT1.1] Sub-acute abdominal pain       Recent Endovascular AAA repair: Presented 10/6/17 with incidental finding of 9 cm (descending) AAA on outpatient w/u of abdominal pain. Endovascular repair of AAA and left renal renal stent placement with Sonja Infante, 10/7/17. Course c/b posterior circulation stroke. On d/c (10/17/17), pllan for f/u CT late 2017. Today, presented with 1 week hx of progressive fatigue, abdominal pain, and bilateral LE shin changes and pain. On admission, patient with stable VS, Hgb 9.7, WBC 11.3, , LA 1.0, Platelets 218, LFT's wnl, Cr doubled to 4.27, GFR 10, BUN 57.  Concern for endovascular leak vs ischemia. Discussed with Vascular Surgery who recommend serial LA, LFT's, non-con CT A/P, renal artery Us with dopplers, Abdominal aorta US with dopplers, IVF.   - Non-contrast CT A/P  - Renal artery, and abdominal aorta US with dopplers  - Add lipase  - Serial LA  - CBC, CMP, CRP, LA in am   - Type and screen   - Vascular Surgery consult- appreciate recommendations and assistance   - Pulse ox  - Low dose oxycodone for pain. Per patient's daughter, she cannot tolerate tylenol d/t lupus and no ibuprofen d/t renal function  - IVF  - Further recommendations pending imaging as above   - Repeat BMP tonight  - NPO for now  - Q4h Vs  - Continue to closely monitor     # Acute Bilateral LE ischemia following recent endovascular repair:  Patient's daughter reports progressive bilateral skin changes on patients tips of toes. With recent endovascular repair, concern for thromboembolic event. Tips of bilateral Great toe with dime sized areas of eschar with erythema/violaceous extension to the mid-proximal phalynx. + bilateral pulses. Vascular surgery consulted.   - Dopplers q6h  - Vascular surgery consult  - Abdominal US with dopplers to eval for intraabdominal  ischemia     # SANDRO on CKD: Cr on admission 4.27, BUN 57, GFR 10. BL appears ~ 2.2-2.5, though limited hx since 10/6/17 on recent admission. Patient appears mildly hypovolemic on admission. Also consider complication from recent endovascular repair (thrombi).   - UA  - Avoid hypotension, dehydration, and nephrotoxins  - IVF  - Further evaluation per Vascular Surgery  - Consider Nephrology consult if not improving  - Renally dose medications[MT1.3]     # Recent CVA:[MT1.1] following recent endovascular repair. Occipital and bilateral inferior cerebellar infarctions. Residual deficits in memory and comprehension. Patients son Valerie is POA for consent.  - Fall precautions[MT1.3]    # COPD:[MT1.1] No PTA O2 use. No cough, sob, or sputum  production reported. PTA albuterol inhaler. Faint crackles at bilateral base.   - Will check CXR  - Continue PTA albuterol[MT1.4]    # CAD:[MT1.1] ECHO 10/14/17 with EF 55-60% NRWMA, though poor acoustic windows. PTA daily ASA and statin, though .   - Did not order ASA tonight[MT1.3]    # DMII:[MT1.1] No A1c on file. BG elevated on admission to 180's. No PTA medications.   - BG checks q4h overnight  - MSSI q4h tonight  - Hypoglycemic protocol[MT1.3]    Diet:[MT1.1]  NPO[MT1.3]   Fluids:[MT1.1] NS bolus with maintainence[MT1.3]  DVT Prophylaxis:[MT1.1] Pneumatic Compression Devices for tonight[MT1.3]  Code Status:[MT1.1] DNR/DNI    Disposition Plan[MT1.2]   Expected discharge:[MT1.1] 2 - 3 days[MT1.3]; recommended to[MT1.1] prior living arrangement[MT1.3] once[MT1.1] Pending Vascular Surgery consult and recommendations[MT1.3].     Entered:[MT1.1] Alanis Guevara 11/16/2017[MT1.2],[MT1.1] 9:22 PM[MT1.2]   Information in the above section will display in the discharge planner report.    The patient's care was discussed with the[MT1.1] Attending Physician, Dr. Lockwood[MT1.3].[MT1.1]    Alanis Guevara PA-C  Internal Medicine Hospitalist Service  [facility]  Pager: [phone][MT1.3]    Please see sticky note for cross cover information  ______________________________________________________________________[MT1.1]    Chief Complaint[MT1.2]   Abdominal pain, LE pain and discoloration, fatigue    History is obtained from the patients daughter and EMR as patient poor historian[MT1.3]    History of Present Illness[MT1.2]   Sandy Sheffield is a 85 year old female[MT1.1] with PMH as outlined above who presented in transfer from Assisted lining with family reports of 1 week hx of worsening abdominal pain, fatigue, decreased mobility from recent BL, and bilateral LE skin changes and pain. Patients daughter reports that prior to recent admission for AAA on 10/6/17 patient resided in Adult  setting and  had BL mobility and memory deficits, though following AAA repair and CVA, patient noted to be more fatigued, disoriented, and immobile. Patient d/c to TCU 10/17/17 for therapy, but was unable to progress with therapy and was recommended to assisted living. For the past week, patient is reported to have had decline in functional status, increase in abdominal pain, and LE skin color changes and pain. She was started on low dose oxycodone in the last few days with mild improvement in sx, though patient still with episodes of uncontrolled, intermittent, sharp abdominal pain localized to the midline. Bilateral LE toes with reports of progressive darkening of the tips of toes, and increased pain over the past few weeks. Patient noted to have slight memory deficits prior to recent admission, though significantly worse since surgery and CVA. Patient reported to have good appetite.     Currently, patient with noted intermittent abdominal pain and confusion.     Patient does not endorse: headaches, changes in vision, chest pain, palpitations, upper respiratory symptoms of rhinorrhea or congestion, shortness of breath, cough, sputum production, wheezing, nausea, emesis, constipation, diarrhea, dysuria, edema, rashes, focal neurologic deficits, fever, chills.[MT1.3]              Review of Systems[MT1.2]   The 10 point Review of Systems is negative other than noted in the HPI or here.[MT1.3]     Past Medical History[MT1.2]    I have reviewed this patient's medical history and updated it with pertinent information if needed.[MT1.3]   Past Medical History:   Diagnosis Date     AAA (abdominal aortic aneurysm) (H) 10/07/2017     SANDRO (acute kidney injury) (H)      SANDRO (acute kidney injury) (H)      CAD (coronary artery disease)      COPD (chronic obstructive pulmonary disease) (H)      CVA (cerebral vascular accident) (H) 10/2017     DMII (diabetes mellitus, type 2) (H)      Gastric hemorrhage 02/2013     HLD (hyperlipidemia)       HTN (hypertension)        Past Surgical History[MT1.2]   I have reviewed this patient's surgical history and updated it with pertinent information if needed.[MT1.3]  Past Surgical History:   Procedure Laterality Date     ENDOVASCULAR REPAIR ANEURYSM ABDOMINAL AORTA N/A 10/7/2017    Procedure: ENDOVASCULAR REPAIR ANEURYSM ABDOMINAL AORTA;  -Endovascular Repair Abdominal Aortic Aneurysm  with Endorant with Limb Extention x1.  - Aortic Dissection x1   -Revision of left Brachial  Artery Repair   -Arterial limited duplex study;  Surgeon: Laura Casillas MD;  Location:  OR        Social History   Social History   Substance Use Topics     Smoking status: Former Smoker     Types: Cigarettes     Smokeless tobacco: Not on file     Alcohol use No       Family History[MT1.2]   I have reviewed this patient's family history and updated it with pertinent information if needed.[MT1.3]   Family History   Problem Relation Age of Onset     Hypertension Mother        Prior to Admission Medications   Prior to Admission Medications   Prescriptions Last Dose Informant Patient Reported? Taking?   ALPRAZolam (XANAX) 0.5 MG tablet   No No   Sig: Take 1 tablet (0.5 mg) by mouth nightly as needed for anxiety (Anxiety)   ASPIRIN PO  Self Yes No   Sig: Take 81 mg by mouth daily   Omega-3 Fatty Acids (FISH OIL BURP-LESS PO)  Self Yes No   Sig: Take 1 capsule by mouth daily   VITAMIN D, CHOLECALCIFEROL, PO  Self Yes No   Sig: Take 2,000 Units by mouth daily    albuterol (PROVENTIL HFA) 108 (90 BASE) MCG/ACT Inhaler   Yes No   Sig: Inhale 2 puffs into the lungs every 6 hours as needed    lidocaine (LIDODERM) 5 % Patch   No No   Sig: Place 2 patches onto the skin every 24 hours   melatonin 3 MG tablet   No No   Sig: Take 1 tablet (3 mg) by mouth nightly as needed for sleep   rosuvastatin (CRESTOR) 40 MG tablet   No No   Sig: Take 1 tablet (40 mg) by mouth daily   scopolamine (TRANSDERM) 72 hr patch   No No   Sig: Place 1 patch onto the skin every 72  hours      Facility-Administered Medications: None     Allergies   Allergies   Allergen Reactions     Ibuprofen GI Disturbance     History of ulcers     Penicillins Nausea and Vomiting     Shrimp Nausea and Vomiting     Tylenol [Acetaminophen] Unknown     Exacerbates her lupus       Physical Exam[MT1.2]   Vital Signs:[MT1.1] Temp: 99.2  F (37.3  C) Temp src: Oral BP: 104/59 Pulse: 94   Resp: 18 SpO2: 95 % O2 Device: None (Room air)[MT1.2]    Weight:[MT1.1] 0 lbs 0 oz[MT1.2]      Physical Exam   Constitutional:[MT1.1] Chronically ill-appearing female lying in bed. Memory deficits. Family at bedside. Intermittent distress noted.[MT1.3]       HEENT:   Head: Normocephalic and atraumatic.   Eyes: Conjunctivae are normal. Pupils are equal, round, and reactive to light.  Pharynx has no erythema or exudate, mucous membranes are moist  Neck:   No adenopathy, no bony tenderness  Cardiovascular: Regular rate and rhythm without murmurs or gallops  Pulmonary/Chest:[MT1.1] Bilateral basilar rales.[MT1.3] no wheezes or retractions. No respiratory distress.  GI: Soft with good bowel sounds.[MT1.1] Moderate tenderness at midline. Periumbilical hernia- reducible.[MT1.3] no guarding, no rebound, no peritoneal signs.   Back:  No bony or CVA tenderness   Musculoskeletal:  No edema or clubbing   Skin: Skin is warm and dry. No rash noted[MT1.1] to exposed skin areas[MT1.3].   Neurological:[MT1.1] Oriented to self.[MT1.3]   Psychiatric:[MT1.1] poor attention, intermittent agitation[MT1.3]      Data[MT1.2]   Data reviewed today: I reviewed all medications, new labs and imaging results over the last 24 hours. I personally reviewed[MT1.1] recent images, progress notes, labs,[MT1.3]     Data     Recent Labs  Lab 11/16/17  1756 11/16/17  1714   WBC  --  11.3*   HGB  --  9.7*   MCV  --  86   PLT  --  218   INR 1.20*  --    NA  --  132*   POTASSIUM  --  3.1*   CHLORIDE  --  100   CO2  --  24   BUN  --  57*   CR  --  4.27*   ANIONGAP  --  9   OKSANA   --  8.6   GLC  --  184*   ALBUMIN  --  2.4*   PROTTOTAL  --  7.0   BILITOTAL  --  0.4   ALKPHOS  --  45   ALT  --  19   AST  --  24[MT1.5]     Physician Attestation    I, Kam Lockwood, saw patient  as part of a shared visit.  I have reviewed and discussed with the advanced practice provider their history, physical and plan.    I personally reviewed the vital signs, medications, labs and imaging .    Above mentioned PMH, recent AAA repair now admitted with sub acute abdominal pain found to have:  1. SANDRO on CKD. Likely  pre-renal from poor intake. I am concerned about  renal artery emboli/infarct. Case was dicussed with vascular and radiology. We will acquire non contrast CT to eval for AAA repair graft. Renal ultrasound with doppler to eval for  Possible renal  infarct. Will hydrate and recheck Kidney function, strict I&O. Family has been updated.   2. Bilateral Big toe ischemia. Patient has good DP pulses. This is likely  from small emboli post surgery. This has been discussed with vascular and will continue to follow[AZ1.1]. Will treat UTI. Follow fever curve and low threshold to broaden antibiotics given resent graft.[AZ1.2]    VS: B/P: 104/59, T: 99.2, P: 94, R: 18  Exam: CTAB, abd soft, mild epigastric tenderness, no rebound or guarding , RRR, no m/r/g, no pedal edema mathieu  Palpable DP pulse. Dark.dry gangrene bilateral big toe.     Kam Lockwood  Date of Service (when I saw the patient):[AZ1.1] 11/16/17[AZ1.3]       Revision History        User Key Date/Time User Provider Type Action    > AZ1.2 11/17/2017  3:22 PM Kam Lockwood MD Physician Sign     AZ1.3 11/16/2017 10:11 PM Kam Lockwood MD Physician Incomplete Revision     AZ1.1 11/16/2017  9:49 PM Kam Lockwood MD Physician      MT1.5 11/16/2017  9:30 PM Alanis Guevara PA-C Physician Assistant Sign     MT1.2 11/16/2017  9:22 PM Alanis Guevara PA-C Physician Assistant      MT1.3 11/16/2017  7:03 PM Alanis Guevara PA-C  Physician Assistant      MT1.4 11/16/2017  6:27 PM Alanis Guevara PA-C Physician Assistant      MT1.1 11/16/2017  4:59 PM Alanis Guevara PA-C Physician Assistant                      Discharge Summaries      Discharge Summaries by Marguerite Nunez NP at 11/27/2017 10:31 AM     Author:  Marguerite Nunez NP Service:  Hospitalist Author Type:  Nurse Practitioner    Filed:  11/27/2017 10:50 AM Date of Service:  11/27/2017 10:31 AM Creation Time:  11/27/2017 10:30 AM    Status:  Cosign Needed :  Marguerite Nunez NP (Nurse Practitioner)    Cosign Required:  Yes                [facility] [place]  Internal Medicine Discharge Summary- Gold Service    Date of Admission:  11/16/2017  Date of Discharge:[GT1.1]  11/27/2017[GT1.2]  Discharging Provider:[GT1.1] Marguerite Nunez[GT1.3], [facility] and Dr. Hernández   Discharge Team: Gold 3[GT1.1]    Discharge Diagnoses[GT1.3]   1.  Aortic Graft Infection  2.  Acute Metabolic Encephalopathy with Delirium  3.  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  SANDRO on CKD  5.  Dysphagia with possible aspiration  6.  Recent posterior circulaton CVA  7.  Normocytic Anemia   8.  Hypokalemia   9.  History of Lupus  10.  History of DMII  11.  COPD   12.  CAD[GT1.1]    Follow-ups Needed After Discharge[GT1.3]   1.  Follow up at [facility] on admission to their facility for assessment and medication reconciliation[GT1.1]     Hospital Course[GT1.3]   Sandy Sheffield is a 85 year old female admitted on 11/16/2017. She has a history of HTN, DM type II, CKD, CAD, COPD, lupus, and AAA s/p endovascular repair (10/7/2017) c/b CVA and is admitted for suspected infection of her aortic graft and SANDRO. Hospital stay c/b acute delirium. PET-CT on 11/22 confirmed likely infection of her graft.  Family has chosen to      Aortic Graft Infection.  S/p endovascular repair for 9 cm descending AAA found incidentally 10/6/17.  Admitted to the hospital  with abd pain, weight loss, fatigue, necrotic lesions on toes.  Imaging studies since admission have confirmed infected aortic graft.  Please see my prior progress notes for details.  ID consulted this admission.  Not a candidate for explantation.  Had been on IV Vanco and Zosyn until family elected to change goals of care to comfort measures therefore antibiotics were discontinued on 11/26.  She is now being discharged to [facility] in [place].  For her abdominal pain, she will be discharged on liquid Dilaudid PRN.  This can be increased per hospice based on patient's needs.       Acute metabolic encephalopathy with delirium.  Acute confusion noted during recent hospitalization for AAA repair and CVA. Gradual improvement by the time of discharge with further improvement at TCU. Daughter notes worsening confusion upon transfer to assisted living. Patient with suspected metabolic encephalopathy in setting of infection with acute hospital induced delirium.  Her mental status and agitation significantly worsened after receiving benzodiazepines and Haldol therefore these were discontinued from her medical regimen.  She tolerated Zyprexa well and will be discharged on Zyprexa 5-10 mg BID PRN and 5 mg at bedtime.        Acral necrosis of bilateral great toes d/t thromboembolism.  Noted progressive discoloration of bilateral great toes x 1 week PTA. Concern for thromboembolic event in setting of recent endovascular repair. Both toes with dark discoloration/eschar of tips, stable. Sensation intact in distal extremities, + pedal pulses. Pain control as above.      SANDRO on CKD[GT1.1]; Urinary Retention.[GT1.4]  Creatinine elevated at 4.27 on admission. Initial improvement w IVF, now stable at . Baseline Cr of approx 2.2-2.4. Left renal stent placed on 10/7. Renal US on admission with patent blood flow to both kidneys. Likely SANDRO multifactorial in setting of hypovolemia . UOP improved. Goals of care changed to comfort  care.  [place] in place for retention[GT1.1] which should be continued on discharge.[GT1.4]      Dysphagia with possible acute aspiration event[GT1.1].[GT1.4]  Concern for possible aspiration event overnight 11/17. CXR revealed increased moderate right pleural effusion with overlying atelectasis or infection. Has been managed on DD1 diet per SLP recommendations.[GT1.1] On discharge, she can be offered food and liquids based on patient's comfort and per family request.[GT1.4]       Recent posterior circulation CVA[GT1.1].[GT1.4]  MRI brain 10/11 following AAA repair revealed abnormal diffusion restriction in left > right occipital lobes and bilateral inferior cerebellar hemispheres c/w acute infarct. Felt possibly related to endovascular procedure. Cardioembolic source felt to be less likely. Repeat echo 11/16 negative for vegetations. Started on ASA and high-dose statin for stroke prophylaxis. Patient with some residual cognitive deficits.       Normocytic Anemia[GT1.1].[GT1.4]  Most recent Hgb 8.3 (8.1), BL ~10-11 although limited data. No acute s/s of bleeding since admission. Likely anemia in setting of recent surgery, poor nutrition.      Hypokalemia[GT1.1].[GT1.4] Likely d/t poor PO intake.  D/C protocol as limiting IV draws.     Lupus. No records on file. No PTA medications. Elevated ESR and CRP on admission felt to be d/t infectious source. Nothing clearly pointing to lupus at this time. Would consider further evaluation if infectious work up negative.     DM2. A1C 6.7% this admission. Diet controlled.[GT1.1] Have discontinued glucose checks and insulin.[GT1.4]     COPD. No wheezing on exam. Cont albuterol PRN[GT1.1] for dyspnea[GT1.4].     CAD.  ASA[GT1.1] discontinued given comfort care status.[GT1.4]     Consultations This Hospital Stay[GT1.3]   VASCULAR ACCESS CARE ADULT IP CONSULT  VASCULAR SURGERY ADULT IP CONSULT  WOUND OSTOMY CONTINENCE NURSE  IP CONSULT  VASCULAR ACCESS CARE ADULT IP  CONSULT  INFECTIOUS DISEASE GENERAL ADULT IP CONSULT  PHYSICAL THERAPY ADULT IP CONSULT  OCCUPATIONAL THERAPY ADULT IP CONSULT  PHARMACY TO DOSE VANCO  SWALLOW EVAL SPEECH PATH AT BEDSIDE IP CONSULT  VASCULAR ACCESS CARE ADULT IP CONSULT  VASCULAR ACCESS ADULT IP CONSULT  VASCULAR ACCESS CARE ADULT IP CONSULT  PALLIATIVE CARE ADULT IP CONSULT  WOUND OSTOMY CONTINENCE NURSE  IP CONSULT  PHYSICAL THERAPY ADULT IP CONSULT  OCCUPATIONAL THERAPY ADULT IP CONSULT  VASCULAR ACCESS CARE ADULT IP CONSULT  VASCULAR ACCESS ADULT IP CONSULT  VASCULAR ACCESS CARE ADULT IP CONSULT  VASCULAR ACCESS CARE ADULT IP CONSULT  VASCULAR ACCESS CARE ADULT IP CONSULT  MEDICATION HISTORY IP PHARMACY CONSULT  VASCULAR ACCESS CARE ADULT IP CONSULT[GT1.5]     Code Status[GT1.3]   Full Code[GT1.4]    Time Spent on this Encounter[GT1.3]   I, Marguerite Nunez, personally saw the patient today and spent greater than 30 minutes discharging this patient.[GT1.4]       Marguerite Nunez[GT1.3], [facility][GT1.4]   Internal Medicine Staff Hospitalist Service  [facility]  Pager:[GT1.1] [phone][GT1.4]  ______________________________________________________________________[GT1.1]    Physical Exam[GT1.3]   Vital Signs:                    Weight:[GT1.1] 167 lbs 1.6 oz[GT1.3]    GENERAL: Appears very comfortable on exam, sleeping.    The rest of the exam was deferred to allow the patient to rest comfortably.[GT1.4]      Significant Results and Procedures[GT1.3]   Results for orders placed or performed during the hospital encounter of 11/16/17   XR Chest Port 1 View    Narrative    Exam: XR CHEST PORT 1 VW, 11/16/2017 6:31 PM    Indication: abdominal pain, Failure to thive, COPD hx, Please eval for  acute patho;     Comparison: 10/16/2017    Findings:   New small right pleural effusion with overlying lung opacities.  Thickening of the minor fissure. Coronary artery stents. Aortic arch  calcification. Left costophrenic angle is sharp. No  pneumothorax.  Central airways midline. Architectural changes of the lungs suggesting  emphysema.      Impression    Impression:   1. New small right pleural effusion with right basilar atelectasis or  consolidation.  2. Findings suggesting lung emphysema.    I have personally reviewed the examination and initial interpretation  and I agree with the findings.    GABRIELA JEFFRIES MD   CT Abdomen Pelvis w/o Contrast     Value    Radiologist flags Intraluminal air within the aorta, concerning for (Urgent)    Narrative    EXAMINATION: CT ABDOMEN PELVIS W/O CONTRAST  11/16/2017 11:05 PM      CLINICAL HISTORY: intermittent abdominal pain, weight loss, failure to  thrive s/p endovascular AAA repair; . Per chart, history of 9 cm  abdominal aortic aneurysm with endovascular repair and left renal  stent placement on 10/7/2017.    COMPARISON: None available    PROCEDURE COMMENTS: CT of the abdomen was performed with oral  contrast. Coronal and sagittal reformatted images were obtained.    FINDINGS:  Lower thorax:   Small right-sided pleural effusion with overlying atelectasis versus  consolidation. Calcifications along the right hemidiaphragm. Mild  bronchial wall thickening lung bases. 2 solid pleural-based nodules in  the left lower lobe including 7 mm nodule (series 5 image 38) and 5 mm  nodule (series 5 image 23). Calcifications the coronary arteries. No  pericardial effusion.    Abdomen and pelvis:  There is a large saccular abdominal aortic aneurysm that measures up  to 8.7 cm in transverse diameter (series 3 image 45) and extends from  the origins of the renal arteries to the aortic bifurcation,  approximately 12 cm in crown collateral dimension (series 4 image 65).  There is an postoperative changes of endovascular graft placement  which starts at the origin of the superior mesenteric artery and  extends inferiorly to the bifurcation of the common iliac arteries. A  left renal artery stent is noted. There is a  significant amount of  hyperattenuating fluid surrounding the endovascular graft with  multiple foci of air (series 5 images 158-200). Additionally there is  haziness/stranding in the retroperitoneal fat along the aorta. There  is no free fluid in the abdomen or retroperitoneum to suggest  rupture/leak. No free intraperitoneal air.    Cholecystectomy. The liver, adrenal glands, and pancreas are normal in  appearance. Calcified splenic granulomata. Multifocal simple and  proteinaceous/hemorrhagic renal cysts bilaterally including 4.3 cm  cyst in the midpole the left kidney. No hydronephrosis or  nephrolithiasis. The bladder is distended. Uterus and adnexa appear  normal for age.    There are no abnormally dilated or thickened loops of small bowel or  colon. The appendix is normal. Colonic diverticulosis without  diverticulitis. There is a [place] ventral abdominal wall defect  measuring up to 5.8 cm containing loops of nonobstructed small bowel.  Contrast noted into the ascending colon. 1.0 cm lymph node under the  cynthia hepatis (series 5 image 90).    Osseous structures and soft tissues:   No acute fractures or suspicious bony lesions. Multilevel advanced  degenerative changes of the lower thoracic and lumbar spine including  large Schmorl's node in inferior endplate of T12. Disc vacuum  phenomenon L3-S1. Vertebral hemangioma L2. Total right hip  arthroplasty. The bones appear osteopenic. There is diffuse muscle  atrophy. Some mild cutaneous edema.      Impression    IMPRESSION:  1. Postoperative changes from aortic endovascular graft and left renal  artery stent placement for an abdominal aortic aneurysm that measures  up to 8.7 cm in max transverse diameter and spans approximately 12 cm  from the origin of the renal arteries to the aortic bifurcation. There  is a large hypoattenuating fluid collection that surrounds the  endovascular graft with multiple foci of intraluminal air along with  diffuse, mild  stranding of the retroperitoneal fat adjacent to the  aorta. Findings are most concerning for infected thrombus/graft though  cannot rule out sequelae of postoperative changes. Technetium labeled  white blood cells with SPECT-CT can be used for evaluation of the  graft infections with high sensitivity and specificity. Additionally,  consider comparing with outside images to assess for possible  endoleak. If films are located, an addendum can be rendered.  2. No evidence of abdominal or retroperitoneal hemorrhage to suggest  aortic leak/rupture.  3. Small right-sided pleural effusion with overlying  atelectasis/consolidation.  4. Two solid, pleural-based nodules in the left lower lobe as  described above, largest measuring 7 mm. Consider comparing with prior  images from outside institution or could obtain dedicated chest CT for  further characterization.  5. Prominent periaortic lymph nodes, presumably reactive.    [Urgent Result: Intraluminal air within the aorta, concerning for  infected graft/thrombus]    Finding was identified on 11/16/2017 11:38 PM.     Dr. Hernandez was contacted by Dr. Mendez at 11/16/2017 11:47 PM and  verbalized understanding of the urgent finding.      I have personally reviewed the examination and initial interpretation  and I agree with the findings.    EDELMIRA ROGERS MD   US Renal Complete    Narrative    EXAMINATION: Renal ultrasound, 11/16/2017 11:52 PM     COMPARISON: None.    HISTORY: Recent repair of 9 cm AAA. Admit with abdominal pain, trachea  and left extremity ischemic changes.    FINDINGS:    Right kidney: Measures 9.3 cm in length. Parenchyma is of normal  thickness and echogenicity. No solid focal mass. No hydronephrosis. A  few simple cysts including 1.1 x 1.0 x 0.9 cm cyst in the superior  pole along with a 1.8 x 1.1 x 1.1 cm cyst in the midpole. There is  Doppler blood flow to the right kidney.    Left kidney: Measures 9.1 cm in length. Parenchyma is of normal  thickness and  echogenicity. No solid focal mass. No hydronephrosis.  There is a 4.1 x 3.7 x 4.3 cm simple cyst in the midpole the left  kidney. There is Doppler blood flow to the left kidney.    Bladder: Distended with normal appearance.      Impression    IMPRESSION:  1.  Doppler blood flow is noted to the kidneys bilaterally.  2.  Multiple simple renal cysts as described above enlarged measuring  up to 4.3 cm in the midpole left kidney.    I have personally reviewed the examination and initial interpretation  and I agree with the findings.    EDELMIRA ROGERS MD   US Aorta/IVC/Iliac Duplex Limited    Narrative    Ultrasound aorta iliac duplex 11/17/2017 9:25 AM    History: Recent history of AAA repair, now concern with emboli to feet  and infected graft.    Comparison: CT from 11/16/2017    Findings: The exam is limited due to the presence of overlying bowel  gas. The proximal end of the graft, near the origin of the superior  mesenteric artery is visualized, and is patent. The graft in the mid  and lower abdomen is entirely obscured.      Impression    IMPRESSION: THE MAJORITY OF THE GRAFT IS OBSCURED BY BOWEL GAS. THE  PROXIMAL GRAFT IS PATENT.    I have personally reviewed the examination and initial interpretation  and I agree with the findings.    DEBBIE LOVE MD   XR Chest Port 1 View    Narrative    XR CHEST PORT 1 VW  11/17/2017 8:49 PM      HISTORY: Dyspnea;     COMPARISON: 11/16/2017    FINDINGS: Portable AP view of the chest. Increased moderate right  pleural effusion with overlying streaky opacities. Heart size is  normal. The left lung is relatively clear. No pneumothorax.      Impression    IMPRESSION: Increased moderate right pleural effusion with overlying  atelectasis or infection    I have personally reviewed the examination and initial interpretation  and I agree with the findings.    JUAN CARLOS ARREDONDO MD   PET Oncology Whole Body    Narrative    Combined Report of:    PET and CT on  11/22/2017 5:02 PM  :    1. PET of the neck, chest, abdomen, and pelvis.  2. PET CT Fusion for Attenuation Correction and Anatomical  Localization:    3. Diagnostic CT scan of the chest, abdomen, and pelvis without  intravenous contrast for interpretation.  3. CT of the chest, abdomen and pelvis obtained for diagnostic  interpretation.  4. 3D MIP and PET-CT fused images were processed on an independent  workstation and archived to PACS and reviewed by a radiologist.    Technique:    1. PET: The patient received 12.9 mCi of F-18-FDG; the serum glucose  was 113 prior to administration, body weight was 89 kg. Images were  evaluated in the axial, sagittal, and coronal planes as well as the  rotational whole body MIP. Images were acquired from the Vertex to the  Feet.    UPTAKE WAS MEASURED AT 60 MINUTES.     2. CT: Volumetric acquisition for clinical interpretation of the  chest, abdomen, and pelvis acquired at 3 mm sections . The chest,  abdomen, and pelvis were evaluated at 5 mm sections in bone, soft  tissue, and lung windows. Patient was unable to hold her breath for  the breath hold images of the chest and lung windows.     3. 3D MIP and PET-CT fused images were processed on an independent  workstation and archived to PACS and reviewed by a radiologist.    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 Blood cultures NGTD. PET-CT recommended to further  rule in/out infection     ADDITIONAL INFORMATION OBTAINED FROM EMR: AAA s/p endovascular repair  (10/7/2017    COMPARISON: CT 11/16/2017    FINDINGS:     HEAD/NECK:  There is no  suspicious FDG uptake in the neck.     Confluent periventricular and subcortical white matter hypodensities  are nonspecific but favored to represent sequelae of chronic small  vessel ischemic disease. Bilateral occipital lobe hypodensities are  compatible with prior infarcts, as seen on MRI 10/11/2017.    The paranasal sinuses are clear. The mastoid air cells are  clear.     The mucosal pharyngeal space, the , prevertebral and carotid  spaces are within normal limits.     No masses, mass effect or pathologically enlarged lymph nodes are  evident. Diminutive thyroid gland without focal abnormality.    CHEST:  Partially visualized PICC in the right upper extremity.    In the peripheral right upper lobe, there is a small area of increased  FDG uptake (axial series 3, image 130), SUV max of 4.5.    Prominent mediastinal lymph nodes without FDG uptake. Heart is normal  in size. Lipomatous hypertrophy of interatrial septum. There are dense  coronary calcifications involving the right and left coronary artery  distributions. No pericardial effusion. Moderate loculated right  pleural effusion and small loculated left pleural effusion. Right  basilar calcifications. Centrilobular and paraseptal emphysematous  changes of the lungs. There is a 6 mm subpleural left lower lobe  pulmonary nodule.    ABDOMEN AND PELVIS:  Post surgical changes of abdominal aortic aneurysm endovascular repair  and left renal artery stent placement. Near the aneurysm neck adjacent  to the aortic graft and inferior to the renal artery stent, there is a  focal area of FDG uptake, SUV max 6.3. Additionally, there are nodular  areas of increased FDG uptake along the left lateral aneurysm sac  wall, SUV max 5.5. The aneurysm sac has a maximum diameter of 9.4 cm  (axial series 3, image 211), previously 9.2 cm. There is a small focus  of gas within the excluded aneurysm sac, decreased from 11/16/2017.  Mild inflammatory changes surrounding the abdominal aortic aneurysm.    There is a focal area of FDG uptake in the sigmoid colon, SUV max  11.3.    Cholecystectomy clips. The liver, adrenal glands, pancreas, and small  bowel are unremarkable. Calcified splenic granulomas. Spleen is normal  in size. Atrophic kidneys with simple fluid density renal cysts. In  the right upper pole, there is a 9 mm exophytic  hyperdense lesion.  Colonic diverticulosis. Small amount of simple free pelvic fluid. No  suspicious lymphadenopathy.    LOWER EXTREMITIES:   No abnormal masses or hypermetabolic lesions. The left femoral vein is  diminutive and there is focal dilatation of the distal popliteal vein.  Right femoral arthroplasty appears normal.    BONES:   There is no abnormal FDG uptake in the skeleton. Chronic T12 anterior  compression deformity with a probable inferior endplate Schmorl's  node.      Impression    IMPRESSION:   1. Postsurgical changes of the AAA endovascular repair and left renal  artery stent placement, with the aneurysm sac maximum diameter of 9.4  cm, previously 9.2 cm. There is asymmetric FDG uptake near the  aneurysm neck adjacent to the aortic graft and inferior to the renal  artery stent, and additional nodular areas of FDG uptake along the  left lateral aneurysm sac wall, with associated mild periaortic fat  stranding and a small focus of gas in the excluded aneurysm sac. 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 The most suspicious area is  just superior to the left renal artery stent along the left side.    2. Small area of increased FDG uptake in the peripheral right upper  lobe. Differential includes loculated pleural fluid, atelectasis,  infection, or infarction.    3. Focal area of FDG uptake in the sigmoid colon. Differential  includes benign and malignant etiologies. If clinically indicated,  colonoscopy may be considered.    4. Bilateral loculated pleural effusions, right greater than left.    5. Diminutive left femoral vein and focal dilatation of the distal  popliteal vein, possibly representing chronic venous thrombosis.  Consider lower extremity venous ultrasound.    6. Large multifocal coronary artery calcifications.    7. Atrophic kidneys with simple fluid density cysts. There is an  additional 9 mm exophytic  hyperdense lesion in the right upper pole  which is too small to accurately characterize.    Resident preliminary results of suspected endograft infection were  discussed with the vascular surgery team at 8:30 AM on 11/24/2017.    I have personally reviewed the examination and initial interpretation  and I agree with the findings.    JUAN CARLOS ARREDONDO MD[GT1.6]       Pending Results[GT1.3]   These results will be followed up by[GT1.1]   Unresulted Labs Ordered in the Past 30 Days of this Admission     No orders found from 9/17/2017 to 11/17/2017.             Primary Care Physician   CORY GIBSON    Discharge Disposition[GT1.3]   Admited to hospice care.   Agency: [facility].  Discharged to home[GT1.4]  Condition at discharge:[GT1.1] Terminal[GT1.4]    Discharge Orders     Discharge Medications   Current Discharge Medication List      START taking these medications    Details   melatonin 3 MG tablet Take 1 tablet (3 mg) by mouth At Bedtime  Qty: 30 tablet, Refills: 0    Associated Diagnoses: Failure to thrive (0-17)      !! OLANZapine zydis (ZYPREXA) 5 MG ODT tab Take 1-2 tablets (5-10 mg) by mouth 2 times daily as needed for agitation or other (Delirium; 1st line therapy)    Associated Diagnoses: Comfort measures only status      !! OLANZapine zydis (ZYPREXA) 5 MG ODT tab Take 1 tablet (5 mg) by mouth At Bedtime    Associated Diagnoses: Comfort measures only status       !! - Potential duplicate medications found. Please discuss with provider.      CONTINUE these medications which have CHANGED    Details   albuterol (PROAIR HFA/PROVENTIL HFA/VENTOLIN HFA) 108 (90 BASE) MCG/ACT Inhaler Inhale 2 puffs into the lungs 4 times daily as needed for other (dyspnea)    Associated Diagnoses: Comfort measures only status      ondansetron (ZOFRAN-ODT) 4 MG ODT tab Take 1 tablet (4 mg) by mouth every 6 hours as needed for nausea or vomiting  Qty: 120 tablet    Associated Diagnoses: Comfort measures only status          STOP taking these medications       ALPRAZOLAM PO Comments:   Reason for Stopping:         OXYCODONE HCL PO Comments:   Reason for Stopping:         OXYCODONE HCL PO Comments:   Reason for Stopping:         polyethylene glycol (MIRALAX/GLYCOLAX) powder Comments:   Reason for Stopping:         senna-docusate (SENOKOT-S;PERICOLACE) 8.6-50 MG per tablet Comments:   Reason for Stopping:         scopolamine (TRANSDERM) 72 hr patch Comments:   Reason for Stopping:         rosuvastatin (CRESTOR) 40 MG tablet Comments:   Reason for Stopping:         ASPIRIN PO Comments:   Reason for Stopping:             Allergies   Allergies   Allergen Reactions     Ibuprofen GI Disturbance     History of ulcers     Penicillins Nausea and Vomiting     Shrimp Nausea and Vomiting     Tylenol [Acetaminophen] Unknown     Exacerbates her lupus[GT1.3]        Revision History        User Key Date/Time User Provider Type Action    > GT1.6 11/27/2017 10:50 AM Marguerite Nunez NP Nurse Practitioner Sign     GT1.5 11/27/2017 10:48 AM Marguerite Nunez, NP Nurse Practitioner      GT1.4 11/27/2017 10:41 AM Marguerite Nunez NP Nurse Practitioner      GT1.2 11/27/2017 10:35 AM Marguerite Nunez, NP Nurse Practitioner      GT1.3 11/27/2017 10:31 AM Marguerite Nunez, NP Nurse Practitioner      GT1.1 11/27/2017 10:30 AM Marguerite Nunez, NP Nurse Practitioner                      Consult Notes      Consults by Fernando Sims APRN CNS at 11/20/2017  9:20 AM     Author:  Fernando Sims APRN CNS Service:  Palliative Author Type:  Clinical Nurse Specialist    Filed:  11/20/2017  5:00 PM Date of Service:  11/20/2017  9:20 AM Creation Time:  11/20/2017  1:30 PM    Status:  Signed :  Fernando Sims APRN CNS (Clinical Nurse Specialist)     Consult Orders:    1. Palliative Care Adult IP Consult: Patient to be seen: Routine within 24 hrs; Call back #: [phone]; Patient with likely infected AAA graft, not surgical candidate - NPO for  aspiration risk, family uncertain about nutrition support/aggressive measures... [[id number]] ordered by Sakshi Ojeda PA-C at 11/19/17 1220                [facility], [place]    Palliative Care Consultation Note    Patient: Sandy Sheffield  Date of Admission:  11/16/2017    Requesting provider/team:[JB1.1] Joanna Carlson MD[SN1.1]  Reason for consult: Pain management  Symptom management  Goals of care  Decisional support  Patient and family support    Recommendations:  Please see assessment below for rationale.[JB1.1]  - DNR/DNI, confirmed with daughter, Mary[JB1.2]  -[SN1.2] Family feels that they need to know that all[JB1.2] reasonable interventions have been attempted  -[SN1.2] Would anticipate[JB1.2] transition to comfort care if such interventions have been done[SN1.2] and continuing aggressive measures would only mean a couple of months[JB1.2]   - In[SN1.2]formed  that more frequent visits are appreciated, family requested daily[JB1.2]  - Ongoing goals of care discussion with patient (if able) and children are appropriate[SN1.2]      - Add Lidoderm patches in hopes may require less opioids, worried opioids could be contributing to urinary retention and confusion  -Recommend non-pharmacological interventions including: environment (not having excessive, inadequate or ambiguous sensory input, medication not interrupting sleep, presenting one stimulus or task at a time), orientation (room should have a clock, calendar, and chart of the day s schedule; evaluate need for glasses, hearing aid, ), familiarity (objects from home, same staff, family members staying with patient, discussion of familiar areas of interest), communication (clear, slow, simple, repetitive, facing patient, warm, firm kindness, address patient by name, identify self, encourage verbal expression), activities (avoid physical restraint, allow movement, encourage self care and personal  activities)[JB1.2]    These recommendations have been discussed with[JB1.1] Joanna Carlson MD[SN1.1] and Isai Grande PA-C.[JB1.2]    Thank you for the opportunity to participate in the care of this patient and family. Our team will follo[JB1.1]krista[SN1.2]. Please feel free to contact the on-call Palliative provider with any urgent needs.[JB1.1]     JERROD Alvarado CNS  Palliative Care Consult Team  Pager: [phone][JB1.2]    [facility] Inpatient Team Consult pager [phone] (M-F 8-4:30)  After-hours Answering Service [phone]   Palliative Clinic: [phone]       Assessment:  Sandy Sheffield is a 85 year old female with[JB1.1] with a complex PMH including HTN, DMII, CKD, CAD, COPD, Lupus, as well as a AAA s/p endovascular repair (10/7/2017) who was admitted after having some abdominal pain and weight loss with concern of infection of her aortic graft. Patient presented on 17 with one week of increasing progressive fatigue, abdominal pain, bilateral LE changes and pain. On admission she was found to have SANDRO on CKD, positive UA, concern for endovascular leak versus ischemia. She underwent testing and is now suspected of having an infected AAA graft which is complicated due to no growth of organisms on blood cultures. She was determined not to be a good candidate for explantation of the graft. There is also concern for thromboemolic event in the setting of her recent endovascular repair. Also had possible aspiration event , SLP saw and recommended DD1 diet. Family reports that patient has had progressive episodes of cognitive decline during hospitalization. They note that she has been requesting and referencing  family members. Patient has also had a new onset of retention and has been straight cathed x2.    After speaking with daughter, Mary, and her brother, as long as medical team has done every reasonable intervention with intent to treat and improve quality of life, family seems very  amendable to comfort care. Main goal is comfort. They are against any invasive or aggressive measures that would more likely prolong life instead of improve quality. We discussed in depth what comfort cares would look like and the options for hospice and that she would likely be taken off broad spectrum abx once confirmed that comfort care is the direction we are going. Mary has stated that she is interested in having her mother stay at a residential hospice facility vs. a  Nursing home with hospice care.[SN1.2]      Symptoms:[JB1.3]   Abdominal pain[SN1.2]: winces to with palpation, denies pain when asked, currently using d[JB1.2]ilaudid 0.5 mg IV Q4 PRN  AMS[SN1.2]: some agitation, using[JB1.2] Haldol 1mg IV Q6 PRN  Nausea/vomiting[SN1.2]: started on[JB1.2] scopolamine patch[SN1.2] after last surgery with zofran PRN, no nausea currently[JB1.2]  Urinary Retention[SN1.2]:[JB1.2] Straight cath[SN1.2]    Social:          Living situation: was at assisted living[JB1.3], family is not interested in a nursing home.[SN1.2]        Support system: children, Mary- daughter, Félix-son, Nahid- son, Juan Manuel- son       Substance use disorder (past / present):[JB1.3] smoker for 65 years[SN1.2]    Spiritual/Islam:    Spiritual background: identifies as Evangelical per documentation[JB1.3] and confirmed by children[SN1.2]  Spiritual needs:[JB1.3] Children interested in having a  pray with her once a day[SN1.2]    Advance Care Planning:           Decision making capacity:[JB1.3] deferred to YOON Hernandez (daughter)[SN1.3]       Disease understanding:[JB1.3] uncertain understanding from patient, but appropriate understanding from family[SN1.3]       Goals of Care:[JB1.3] leaning towards comfort care/hospice.[SN1.3]        Preferred way of decision making:        Health care directive:[JB1.3] none on file[SN1.3]       Health care agent:        Code Status:[JB1.3]  DNR / DNI[SN1.3]       POLST[JB1.3] There is no POLST (Physician orders  for life-sustaining treatment) form on file for this patient[SN1.3]      His[JB1.3]tory of Present Illness   Sources of History:[JB1.1]patient and patient's daughter (Mary) and son[SN1.2]    Sandy Sheffield is a 85 year old female with[JB1.1] with a complex PMH including HTN, DMII, CKD, CAD, COPD, Lupus, as well as a AAA s/p endovascular repair (10/7/2017) who was admitted after having some abdominal pain and weight loss. and bilateral LE changes and pain. Patient was residing in assistant living facility after being discharged from TCU due to resistance to rehab. Daughter reports getting in contact with vascular team after symptoms progressed before bringing her into the hospital. Family reports that patient has had progressive episodes of cognitive decline during hospitalization. They note that she has been requesting and referencing  family members. Patient has also had a new onset of retention and has been straight cathed x2.     Prior to admission she had a AAA on 10/6/17 and prior had resided at a [facility] and had bilateral mobility and memory deficits, she had a AAA repair and renal stent placement 10/7 and her postoperative course was complicated by a stroke and LE clots. She also developed likely a postoperative nausea/vomiting, and delirium and has been taking a scopolamine patch since last admission. Daughter reports of 2 episodes of nausea, one during the ambulance ride and another during her current admission, but denies any vomiting.     She was discharged to TCU on 10/17/17 and was not able to progress with therapy and assisted living was recommended. She was started on a low dose oxycodone for her pain prior to admission. Daughter reports that patient's mobility has been significantly limited due to apparent resistance for rehab.[SN1.2]    Palliative care consulted  for patient with likely infected AAA graft, not a surgical candidate, NPO for risk of aspiration, family is uncertain about  nutrition support and aggressive measures.    ROS:  Palliative Symptom Review (0=no symptom/no concern, 1=mild, 2=moderate, 3=severe):  Pain:[JB1.1] 0[SN1.2]  Nausea:[JB1.1] 0[SN1.2]  Shortness of Breath:[JB1.1] 0 (on oxymask)[SN1.2]  Delirium:[JB1.1] 2[SN1.2]  Other:[JB1.1] dysphagia (1)[SN1.2]  Overall (0 good/no concerns, 3 very poor):[JB1.1] 2[SN1.2]       Past Medical History:   Past Medical History:   Diagnosis Date     AAA (abdominal aortic aneurysm) (H) 10/07/2017     SANDRO (acute kidney injury) (H)      SANDRO (acute kidney injury) (H)      CAD (coronary artery disease)      COPD (chronic obstructive pulmonary disease) (H)      CVA (cerebral vascular accident) (H) 10/2017     DMII (diabetes mellitus, type 2) (H)      Gastric hemorrhage 02/2013     HLD (hyperlipidemia)      HTN (hypertension)           Past Surgical History:   Past Surgical History:   Procedure Laterality Date     ENDOVASCULAR REPAIR ANEURYSM ABDOMINAL AORTA N/A 10/7/2017    Procedure: ENDOVASCULAR REPAIR ANEURYSM ABDOMINAL AORTA;  -Endovascular Repair Abdominal Aortic Aneurysm  with Endorant with Limb Extention x1.  - Aortic Dissection x1   -Revision of left Brachial  Artery Repair   -Arterial limited duplex study;  Surgeon: Laura Casillas MD;  Location: [facility] OR             Family History:   Family History   Problem Relation Age of Onset     Hypertension Mother      Family history[JB1.1] reviewed[SN1.3]       Allergies:   Allergies   Allergen Reactions     Ibuprofen GI Disturbance     History of ulcers     Penicillins Nausea and Vomiting     Shrimp Nausea and Vomiting     Tylenol [Acetaminophen] Unknown     Exacerbates her lupus            Medications:   I have reviewed this patient's medication profile and medications given in the past 24 hours.[JB1.1]    Medications of Note  Scopolamine patch (started 1/16?)  Haldol 1 mg q6h PRN[JB1.4]- x3  Hydromorphone 0.5 mg q4h PRN- 2 mg over last 24 hours  Ondansetron 4 mg q6h- x1  Senna 2 tablets 2 times  per day[JB1.3]         Physical Exam:   Vital Signs: Temp: 96  F (35.6  C) Temp src: Axillary BP: 116/54 Pulse: 87   Resp: 18 SpO2: 99 % O2 Device: Simple face mask Oxygen Delivery: 3 LPM  Weight: 0 lbs 0 oz    Physical Exam:[JB1.1]  Constitutional: Awake, alert, cooperative, no apparent distress, and appears stated age.  Lungs: No increased work of breathing, good air exchange, clear to auscultation bilaterally, no crackles or wheezing.  Cardiovascular: Regular rate and rhythm, normal S1 and S2, no S3 or S4, and no murmur noted.  Neurologic: Awake, alert, oriented to name, place and time.[JB1.4]         Data reviewed:[JB1.1]     ROUTINE ICU LABS (Last four results)  CMP[JB1.4]  Recent Labs  Lab 11/20/17  0700 11/19/17  0629 11/18/17  0803 11/17/17  0824  11/16/17  1714    137 136 136  < > 132*   POTASSIUM 3.3* 3.5 3.4 3.5  < > 3.1*   CHLORIDE 112* 109 111* 106  < > 100   CO2 21 17* 14* 19*  < > 24   ANIONGAP 8 10 11 11  < > 9   * 131* 137* 107*  < > 184*   BUN 36* 42* 45* 49*  < > 57*   CR 3.75* 3.87* 3.82* 3.89*  < > 4.27*   GFRESTIMATED 11* 11* 11* 11*  < > 10*   GFRESTBLACK 14* 13* 14* 13*  < > 12*   OKSANA 7.9* 8.4* 7.8* 8.2*  < > 8.6   MAG  --   --  2.2  --   --  2.5*   PROTTOTAL  --   --  5.8*  --   --  7.0   ALBUMIN  --   --  1.8*  --   --  2.4*   BILITOTAL  --   --  0.2  --   --  0.4   ALKPHOS  --   --  38*  --   --  45   AST  --   --  27  --   --  24   ALT  --   --  18  --   --  19   < > = values in this interval not displayed.[JB1.5]  CBC[JB1.4]  Recent Labs  Lab 11/20/17  0700 11/19/17  0629 11/18/17  0803 11/17/17  0824   WBC 7.4 9.3 8.7 10.2   RBC 3.07* 3.16* 3.06* 3.26*   HGB 8.1* 8.4* 8.2* 8.7*   HCT 26.7* 27.6* 28.3* 28.2*   MCV 87 87 93 87   MCH 26.4* 26.6 26.8 26.7   MCHC 30.3* 30.4* 29.0* 30.9*   RDW 15.5* 15.3* 15.8* 15.1*    187 174 199[JB1.5]     INR[JB1.4]  Recent Labs  Lab 11/16/17  1756   INR 1.20*[JB1.5]     Arterial Blood Gas[JB1.4]No lab results found in last 7  days.[JB1.5]    JERROD Alvarado  Palliative Care Consult Team  Pager: [phone][JB1.4]    [facility] Inpatient Team Consult pager [phone] (M-F 8-4:30)  After-hours Answering Service [phone]  Palliative Clinic: [phone]     Total time spent was[JB1.1] 80[JB1.2] minutes,  >50% of time was spent counseling and/or coordination of care regarding[JB1.1] goals of care with family[SN1.2].[JB1.1]           Revision History        User Key Date/Time User Provider Type Action    > JB1.2 11/20/2017  5:00 PM Fernando Sims APRN CNS Clinical Nurse Specialist Sign     SN1.1 11/20/2017  3:50 PM Raf Villa Medical Student Share     SN1.3 11/20/2017  3:23 PM Raf Villa Medical Student Share     SN1.2 11/20/2017  1:30 PM Raf Villa Medical Student      JB1.3 11/20/2017 10:25 AM Fernando Sims APRN CNS Clinical Nurse Specialist      JB1.5 11/20/2017 10:24 AM Fernando Sims APRN CNS Clinical Nurse Specialist      JB1.4 11/20/2017 10:23 AM Fernando Sims APRN CNS Clinical Nurse Specialist      JB1.1 11/20/2017  9:20 AM Fernando Sims APRN CNS Clinical Nurse Specialist             Consults by Js Russell MD at 11/17/2017  8:52 AM     Author:  Js Russell MD Service:  Infectious Disease Author Type:  Physician    Filed:  11/17/2017  4:25 PM Date of Service:  11/17/2017  8:52 AM Creation Time:  11/17/2017  8:52 AM    Status:  Signed :  Js Russell MD (Physician)     Consult Orders:    1. Infectious Disease General Adult IP Consult: Patient to be seen: Routine within 24 hrs; Call back #: [phone]; Patient with recent history of AAA repair with grafting, now concern for infection with air noted on CT; Consultant may enter orders: Yes [[id number]] ordered by Sakshi Ojeda PA-C at 11/17/17 0801                    [facility] Service: Initial Consultation     Patient:  Sandy Sheffield, Date of birth 11/5/1932, Medical  record number [id number]  Date of Visit:  November 17, 2017  Consult Requested by:[SS1.1] Dr. 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 She presented on 11/16 with abdominal pain, necrotic great toes, and an SANDRO concerning for graft pathology. CT confirmed it and showed a large fluid collection with air suggestive of vascular graft infection. The infection is likely causing embolic phenomenon to her toes and possibly her kidneys. Overall prognosis of this condition is poor especially since she is a poor candidate for explantation. So, optimal therapy here will be suppressive antibiotics. Currently, we do not have an organism to target (blood cultures are still incubating) so will cover for MRSA and Pseudomonas given that it is an hospital acquired infection. So, recommend vancomycin and piperacillin-tazobactam. Regarding the latter, per EMR she develops nausea/emesis with PCN and patient states that she gets a rash with PCN. Neither of those are overtly serious and we would like to use a PCN antibiotic for this infection to plan for long term oral outpatient  "therapy (would prefer not to use an oral cephalosporin outpatient). So, recommend starting piperacillin-tazobactam and if she develops an intolerance or a rash, switch to cefepime.[SS1.3]     Patient seen and discussed with [SS1.1] Marcel Russell[SS1.2], who agrees with the plan above.    Huey Gary  Internal Medicine, PGY-[SS1.1]3[SS1.2]  [phone][SS1.1]  Attestation:  This patient has been seen and evaluated by me,[RR1.1] Js Russell MD[RR1.2].  I discussed this patient with the fellow and/or resident(s) and agree with the findings and plan in this note. I also personally edited this note to reflect my findings. I have reviewed today's vital signs, medications, labs and imaging.   NAT Russell M.D.  [facility] Service Staff  [phone][RR1.1]         History of Present Illness:[SS1.1]   Sandy Sheffield is a 86yo female with a hx of COPD, HLD, CKD-IV, T2DM, HTN, CAD, cognitive decline, and recent admission (10/6-10/17) for an EVAR of a 8.7cm AAA (from origin of renal aa to bifurcation) on 10/7 also requiring a left renal snorkel with a post operative course c/b stroke who presented on 11/16 from [facility] with abdominal pain and lower extremity skin changes.   History is obtained from chart as patient is not a good historian. Patient has not been doing well since discharge with abdominal pain and limited mobility along with fatigue and worsening cognition. Also noted changes of darkening color to her toes on both sides. Abdominal pain is sharp, midline[SS1.2], radiating towards back[SS1.5], and intermittent. She was admitted to Medicine for abdominal pain and necrosis of her extremitites with Vascular surgery consulting. VSS were stable on admission and labs showed an SANDRO. A CT was obtained which showed \"large hypoattenuating fluid collection that surrounds the endovascular graft with multiple foci of intraluminal air along with diffuse, mild stranding of the retroperitoneal fat adjacent to the aorta\" which is " concerning for a vascular graft infection. So, she was placed on ceftriaxone and ID was consulted for further management. Renal US showed good flow to b/l aa and aortic US is still pending.[SS1.2]          Review of Systems:   Full 12 point ROS obtained, pertinent positives and negatives as above.       Past Medical History:     Past Medical History:   Diagnosis Date     AAA (abdominal aortic aneurysm) (H) 10/07/2017     SANDRO (acute kidney injury) (H)      SANDRO (acute kidney injury) (H)      CAD (coronary artery disease)      COPD (chronic obstructive pulmonary disease) (H)      CVA (cerebral vascular accident) (H) 10/2017     DMII (diabetes mellitus, type 2) (H)      Gastric hemorrhage 02/2013     HLD (hyperlipidemia)      HTN (hypertension)      Past Surgical History:   Procedure Laterality Date     ENDOVASCULAR REPAIR ANEURYSM ABDOMINAL AORTA N/A 10/7/2017    Procedure: ENDOVASCULAR REPAIR ANEURYSM ABDOMINAL AORTA;  -Endovascular Repair Abdominal Aortic Aneurysm  with Endorant with Limb Extention x1.  - Aortic Dissection x1   -Revision of left Brachial  Artery Repair   -Arterial limited duplex study;  Surgeon: Laura Casillas MD;  Location: [facility] OR         Allergies:      Allergies   Allergen Reactions     Ibuprofen GI Disturbance     History of ulcers     Penicillins Nausea and Vomiting     Shrimp Nausea and Vomiting     Tylenol [Acetaminophen] Unknown     Exacerbates her lupus            Current Antimicrobials:[SS1.1]   Ceftriaxone[SS1.2]         Family History:     Family History   Problem Relation Age of Onset     Hypertension Mother           Social History:     Social History     Social History     Marital status:      Spouse name: N/A     Number of children: N/A     Years of education: N/A     Occupational History     Not on file.     Social History Main Topics     Smoking status: Former Smoker     Types: Cigarettes     Smokeless tobacco: Not on file     Alcohol use No     Drug use: Not on file     Sexual  activity: Not on file     Other Topics Concern     Not on file     Social History Narrative              Physical Exam:   Ranges forvital signs:  Temp:  [97.7  F (36.5  C)-99.2  F (37.3  C)] 98.2  F (36.8  C)  Pulse:  [85-94] 88  Resp:  [18-40] 28  BP: (104-124)/(59-63) 114/60  SpO2:  [95 %-98 %] 96 %    Intake/Output Summary (Last 24 hours) at 11/17/17 0852  Last data filed at 11/17/17 0659   Gross per 24 hour   Intake              800 ml   Output              150 ml   Net              650 ml       Exam:  GENERAL:[SS1.1]  Sleeping[SS1.5]    ENT:  Head is normocephalic, atraumatic. Oropharynx is moist without exudates or ulcers.  EYES:  Eyes have anicteric sclerae.    NECK:  Supple.  LUNGS:[SS1.1]  Distant breath sounds with mild wheezing, on nasal cannula[SS1.5]   CARDIOVASCULAR:  Regular rate and rhythm with no murmurs, gallops or rubs.  ABDOMEN:  Normal bowel sounds, soft, nontender.[SS1.1] Umbilical hernia present. Pain to deep palpation present[SS1.5]  EXT: Extremities warm and without edema.  SKIN:[SS1.1]  1cm circumferential eschar's present on b/l great toe's with surrounding erythema[SS1.5]   NEUROLOGIC:  Grossly nonfocal.         Laboratory Data:   Reviewed.  Pertinent for:    Creatinine   Date Value Ref Range Status   11/17/2017 4.06 (H) 0.52 - 1.04 mg/dL Final   11/16/2017 4.27 (H) 0.52 - 1.04 mg/dL Final   10/13/2017 2.09 (H) 0.52 - 1.04 mg/dL Final   10/12/2017 2.21 (H) 0.52 - 1.04 mg/dL Final   10/11/2017 2.38 (H) 0.52 - 1.04 mg/dL Final     WBC   Date Value Ref Range Status   11/16/2017 11.3 (H) 4.0 - 11.0 10e9/L Final   10/13/2017 9.5 4.0 - 11.0 10e9/L Final   10/12/2017 9.8 4.0 - 11.0 10e9/L Final   10/11/2017 13.3 (H) 4.0 - 11.0 10e9/L Final   10/10/2017 16.3 (H) 4.0 - 11.0 10e9/L Final     Hemoglobin   Date Value Ref Range Status   11/16/2017 9.7 (L) 11.7 - 15.7 g/dL Final     Platelet Count   Date Value Ref Range Status   11/16/2017 218 150 - 450 10e9/L Final     Lab Results   Component Value  Date     11/17/2017    BUN 52 (H) 11/17/2017    CO2 23 11/17/2017       Culture data: Reviewed in EPIC          Imaging:   Reviewed in EPIC[SS1.1]           Revision History        User Key Date/Time User Provider Type Action    > RR1.2 11/17/2017  4:25 PM Js Russell MD Physician Sign     RR1.1 11/17/2017  4:23 PM Js Russell MD Physician      SS1.3 11/17/2017  3:25 PM Huey Vega MD Resident Sign     SS1.4 11/17/2017 10:47 AM Huey Vega MD Resident      SS1.5 11/17/2017 10:39 AM Huey Vega MD Resident      SS1.2 11/17/2017  9:09 AM Huey Vega MD Resident      SS1.1 11/17/2017  8:52 AM Huey Vega MD Resident             Consults by Flavio Hernandez MD at 11/16/2017  7:07 PM     Author:  Flavio Hernandez MD Service:  Vascular Surgery Author Type:  Resident    Filed:  11/16/2017  8:53 PM Date of Service:  11/16/2017  7:07 PM Creation Time:  11/16/2017  8:53 PM    Status:  Attested :  Flavio Hernandez MD (Resident)    Cosigner:  Jace Verduzco MD at 11/17/2017  1:03 AM         Consult Orders:    1. Vascular Surgery Adult IP Consult: Patient to be seen: Routine within 24 hrs; Call back #: [phone]; recent AAA repair. Admit with abdominal pain and LE ischemic changes; Consultant may enter orders: Yes [[id number]] ordered by Alanis Guevara PA-C at 11/16/17 1926           Attestation signed by Jace Verduzco MD at 11/17/2017  1:03 AM        Attestation:  See other consult note from this same date  Jace VREDUZCO  11/17/2017  1:02 AM                                 See other consult note on this date.[TG1.1]     Revision History        User Key Date/Time User Provider Type Action    > TG1.1 11/16/2017  8:53 PM Flavio Hernandez MD Resident Sign            Consults by Flavio Hernandez MD at 11/16/2017  7:07 PM     Author:  Flavio Hernandez MD Service:  Vascular Surgery Author Type:  Resident    Filed:  11/16/2017  8:34 PM  Date of Service:  11/16/2017  7:07 PM Creation Time:  11/16/2017  7:03 PM    Status:  Attested :  Flavio Hernandez MD (Resident)    Cosigner:  Jace Verduzco MD at 11/17/2017  1:02 AM        Attestation signed by Jace Verduzco MD at 11/17/2017  1:02 AM        Attestation:  Physician Attestation   I did not see the patient on this date.Discussed with resident over the phone tonight. Previous and current imaging reviewed    86 yo female known to Vascular surgery service s/p snorkel endograft repair of symptomatic 9 cm AAA on 10/6/2017. Post op course complicated by posterior circulation stroke. Readmittted tonight with abdominal pain and growing necrosis of great toes bilaterally    Repeat non-contrast CT scan shows mild stranding around aneurysm and multilple foci of intraluminal air concerning for graft infection as wll as PE consistent with changes consistent with embolization    A/P  1) Possible primary graft infection. Start suppressive antibiotics. Check baseline CRP/ESR. Will notify Dr. Casillas in am. Doubtful patient candidate for explant  2) Probable emboli to feet leading to great toe lesions, presumably from aneurysm during endograft placement. Supportive local cares    Discussed with resident on call mati VERDUZCO                               VASCULAR SURGERY CONSULTATION  11/16/2017       Date of Admission:  11/16/2017  Reason for Consultation: We were asked by Dr. Guevara of medicine to evaluate Sandy Sheffield for necrosis at tip of bilateral great toes and intermittent abdominal pain in the setting of recent endovascular AAA repair. The patient was seen and evaluated.  Attending: Dr. Jace Verduzco    HPI: Sandy Sheffield is a 85 year old female with significant history of  COPD, lupus, CKD, CAD, HTN, DM II, and 9cm AAA s/p endovascular repair, L renal artery stenting on 10/7/17 who presented to medicine for failure to thrive from an assisted living home. Her postoperative  course was complicated by long SICU stay (11 days) and a small cerebellar stroke that has affected her balance, vision, and nausea. She was discharged Her daughter, Mary, thinks that her intermittent confusion has been worsening over the past few days. She has also lost close to 20 pounds since surgery with little appetite, nausea has improved. 3 or so weeks ago, the daughter noted a black spot on the patient's R great toe and then also on the left. These spots have slowly gotten larger. The patient has also had intermittent abdominal pain that is usually upper midline and radiates to her R side. The daughter is not aware of any blood bowel movements.[TG1.1] She has been apparently tolerating a diet at the AL facility but has had trouble swallowing pills.[TG1.2] The patient is not oriented and not a good historian. This history was obtained with chart review and family assistance.     ROS:   The Review of Systems is negative other than noted in the HPI.    Past Medical History:  COPD  CKD  Lupus  CAD  HTN  DM II[TG1.1]  Gastric perforation[TG1.2]  AAA    Past Surgical History:[TG1.1]   Past Surgical History:   Procedure Laterality Date     ENDOVASCULAR REPAIR ANEURYSM ABDOMINAL AORTA N/A 10/7/2017    Procedure: ENDOVASCULAR REPAIR ANEURYSM ABDOMINAL AORTA;  -Endovascular Repair Abdominal Aortic Aneurysm  with Endorant with Limb Extention x1.  - Aortic Dissection x1   -Revision of left Brachial  Artery Repair   -Arterial limited duplex study;  Surgeon: Laura Casillas MD;  Location: [facility] OR[TG1.3]   Exploratory laparotomy for gastric perf[TG1.2]    Medications:[TG1.1]     No current facility-administered medications on file prior to encounter.   Current Outpatient Prescriptions on File Prior to Encounter:  melatonin 3 MG tablet Take 1 tablet (3 mg) by mouth nightly as needed for sleep   scopolamine (TRANSDERM) 72 hr patch Place 1 patch onto the skin every 72 hours   rosuvastatin (CRESTOR) 40 MG tablet Take 1 tablet (40  "mg) by mouth daily   lidocaine (LIDODERM) 5 % Patch Place 2 patches onto the skin every 24 hours   ALPRAZolam (XANAX) 0.5 MG tablet Take 1 tablet (0.5 mg) by mouth nightly as needed for anxiety (Anxiety)   albuterol (PROVENTIL HFA) 108 (90 BASE) MCG/ACT Inhaler Inhale 2 puffs into the lungs every 6 hours as needed    ASPIRIN PO Take 81 mg by mouth daily   Omega-3 Fatty Acids (FISH OIL BURP-LESS PO) Take 1 capsule by mouth daily   VITAMIN D, CHOLECALCIFEROL, PO Take 2,000 Units by mouth daily[TG1.3]      Allergies:[TG1.1]   Allergies   Allergen Reactions     Penicillins Nausea and Vomiting     Shrimp Nausea and Vomiting[TG1.3]     Social History:[TG1.1]   Social History     Social History     Marital status:      Spouse name: N/A     Number of children: N/A     Years of education: N/A     Occupational History     Not on file.     Social History Main Topics     Smoking status: Not on file     Smokeless tobacco: Not on file     Alcohol use Not on file     Drug use: Not on file     Sexual activity: Not on file     Other Topics Concern     Not on file     Social History Narrative[TG1.3]     Family History:[TG1.1]   No family history on file.[TG1.3]    Exam:[TG1.1]  /59 (BP Location: Right arm)  Pulse 94  Temp 99.2  F (37.3  C) (Oral)  Resp 18  Ht 1.727 m (5' 8\")  SpO2 95%[TG1.3]  General:[TG1.1] Follows commands but oriented only to self, knows she's in a hospital but cannot name which one.[TG1.2]  CV:[TG1.1] Reg rate, irregularly irregular rhythm[TG1.2]  Pulm: Non labored breathing  Abd: soft[TG1.1], obese, diffuse mild tenderness. Nonperitoneal.[TG1.2]    Ext:[TG1.1] Wiggles toes bilaterally, erythema in great toes with associated necrotic area distal great toes, other toes normal. +Doppler signals in PT/DP bilaterally.     [TG1.2]    Labs: Reviewed in full.[TG1.1]  132 100 57[TG1.2] /[TG1.1] 184  3.1 24 4.27[TG1.2] \[TG1.1]    11.3[TG1.2] >[TG1.1] 9.7[TG1.2] <[TG1.1] 218[TG1.2]    Lactate[TG1.1] " pending[TG1.2]  INR[TG1.1] 1.2[TG1.2]    Alk[TG1.1] 45[TG1.2]  ALT[TG1.1] 19[TG1.2]  AST[TG1.1] 24[TG1.2]  tBili[TG1.1] 0.4[TG1.2]    Imaging:[TG1.1]   CT A/P with oral contrast pending  Abd US with dopplers pending[TG1.2]    Assessment: Sandy Sheffield is a 85 year old female with[TG1.1] multiple medical co-morbidities and 9cm AAA s/p endovascular AAA repair with Dr. Casillas on 10/7/17 c/b post-operative CVA is admitted to medicine with failure to thrive, intermittent abdominal pain of unclear etiology, weight loss, and AMS. Also with necrotic areas of b/l great toes likely embolic (blue toe syndrome).[TG1.2]     Recommendations:  -[TG1.1] IVF resuscitation.  - Continue work up of abdominal pain. CT A/P non-contrast ordered to evaluate AAA size compared to prior to repair to help r/o possible endoleak.   - Can consider abdominal US with dopplers to assess flow to mesenteric vessels and kidneys (h/o renal artery stenosis).[TG1.2]   -[TG1.1] Serial labs and abdominal exams including lactate.[TG1.2]   -[TG1.1] Add on lipase to r/o pancreatitis. LFTs normal but may pain may be r/t biliary etiology.[TG1.2]  - Local cares to feet, monitor for infection.[TG1.4]  - Issues with swallowing pills at assisted living --> speech path consult.   - Nutrition consult.  - Of note, the patient's family and surrogate decision maker (Mary) have made the patient DNR/DNI. They are unsure if they would want additional procedures/surgery at this time if needed.[TG1.2]     The patient was discussed with[TG1.1] Dr. Sagastume[TG1.2],[TG1.1] vascular surgery[TG1.2] staff, who agrees with the plan.    Flavio Hernandez MD  General Surgery PGY-[TG1.1]3[TG1.2]  Pager [phone][TG1.1]                     Revision History        User Key Date/Time User Provider Type Action    > TG1.4 11/16/2017  8:34 PM Flavio Hernandez MD Resident Sign     TG1.2 11/16/2017  7:59 PM Flavio Hernandez MD Resident Sign     TG1.3 11/16/2017  7:07 PM Flavio Hernandez MD  Resident      TG1.1 11/16/2017  7:03 PM Flavio Hernandez MD Resident                      Progress Notes - Physician (Notes from 11/24/17 through 11/27/17)      Progress Notes by Nuria Pickering RD at 11/27/2017  8:41 AM     Author:  Nuria Pickering RD Service:  Nutrition Author Type:  Registered Dietitian    Filed:  11/27/2017  8:43 AM Date of Service:  11/27/2017  8:41 AM Creation Time:  11/27/2017  8:41 AM    Status:  Signed :  Nuria Pickering RD (Registered Dietitian)         Clinical Nutrition Services    Patient transitioned to comfort cares.     RD to sign off at this time unless consulted.    The above note was completed by Clau Carvajal RD, DANY Pager [phone][SG1.1]     Revision History        User Key Date/Time User Provider Type Action    > SG1.1 11/27/2017  8:43 AM Nuria Pickering RD Registered Dietitian Sign            Progress Notes by Jace Hernández MD at 11/26/2017  4:04 PM     Author:  Jace Hernández MD Service:  Hospitalist Author Type:  Physician    Filed:  11/26/2017  9:09 PM Date of Service:  11/26/2017  4:04 PM Creation Time:  11/26/2017  4:04 PM    Status:  Signed :  Jace Hernández MD (Physician)            [facility], [place]  Internal Medicine Progress Note - Gold Service      Assessment & Plan   Sandy Sheffield is a 85 year old female admitted on 11/16/2017. She has a history of HTN, DM type II, CKD, CAD, COPD, lupus, and AAA s/p endovascular repair (10/7/2017) c/b CVA and is admitted for suspected infection of her aortic graft and SANDRO. Hospital stay c/b acute delirium. PET-CT on 11/22 confirmed likely infection of her graft.  Family has chosen to     Aortic Graft Infection - S/p endovascular repair for 9 cm descending AAA found incidentally 10/6/17.  Admitted to the hospital with abd pain, weight loss, fatigue, necrotic lesions on toes.  Imaging studies since admission have confirmed infected aortic graft.  Please  see my prior progress notes for details.  ID consulted this admission.  Not a candidate for explantation.  Had been on IV Vanco and Zosyn until family elected to change goals of care to comfort measures.  Antibiotics d/cd 11/25.  - Social work following closely for hospice placement, currently looking at [facility] in [place]  - Pain control: Liquid Hydromorphone  - Appreciate ongoing input from palliative care, will discuss with them on Monday      Acute metabolic encephalopathy with delirium - Acute confusion noted during recent hospitalization for AAA repair and CVA. Gradual improvement by the time of discharge with further improvement at TCU. Daughter notes worsening confusion upon transfer to assisted living. Patient with suspected metabolic encephalopathy in setting of infection with acute hospital induced delirium.  Please see my prior progress notes this week for full details on workup.  Currently improved on Zyprexa.   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 Concern for thromboembolic event in setting of recent endovascular repair. Both toes with dark discoloration/eschar of tips, stable. Sensation intact in distal extremities, + pedal pulses. Pain control as above.     SANDRO on CKD - Creatinine elevated at 4.27 on admission. Initial improvement w IVF, now stable at . Baseline Cr of approx 2.2-2.4. Left renal stent placed on 10/7. Renal US on admission with patent blood flow to both kidneys. Likely SANDRO multifactorial in setting of hypovolemia . UOP improved. Goals of care changed to comfort care.  [place] in place for retention.     Dysphagia with possible acute aspiration event - Concern for possible aspiration event overnight 11/17. CXR revealed increased moderate right pleural effusion with overlying atelectasis or  infection. Has been managed on DD1 diet per SLP recommendations.   - Can offer food and drink for comfort per family request     Recent posterior circulation CVA - MRI brain 10/11 following AAA repair revealed abnormal diffusion restriction in left > right occipital lobes and bilateral inferior cerebellar hemispheres c/w acute infarct. Felt possibly related to endovascular procedure. Cardioembolic source felt to be less likely. Repeat echo 11/16 negative for vegetations. Started on ASA and high-dose statin for stroke prophylaxis. Patient with some residual cognitive deficits.       Normocytic Anemia - Most recent Hgb 8.3 (8.1), BL ~10-11 although limited data. No acute s/s of bleeding since admission. Likely anemia in setting of recent surgery, poor nutrition. No further CBC checks.       Hypokalemia - Likely d/t poor PO intake.  D/C protocol as limiting IV draws.      Chronic Medical Problems:  Lupus. No records on file. No PTA medications. Elevated ESR and CRP on admission felt to be d/t infectious source. Nothing clearly pointing to lupus at this time. Would consider further evaluation if infectious work up negative.   DM2. A1C 6.7% this admission. Diet controlled. Hyperglycemic on admission. Now well controlled on medium intensity SSI.   COPD. No wheezing on exam. Cont albuterol PRN.   CAD.  ASA on hold d/t aspiration risk.     Diet: Dysphagia Diet Level 1 Pureed Honey Thickened Liquids (pre-thickened or use instant food thickener) (Use spoon only - no cup or straw)  Room Service  Snacks/Supplements Adult: Magic Cup; Between Meals  Fluids: D50.45NS at 75mL/hour   DVT Prophylaxis: Pneumatic Compression Devices  Code Status: Comfort Care    Disposition Plan   Expected discharge: 2 - 3 days, recommended to hospice facility once safe disposition plan/ TCU bed available.     Entered: Marguerite Nunez 11/26/2017, 4:04 PM   Information in the above section will display in the discharge planner report.      The patient's  care was discussed with the Attending Physician, Dr. Hernández, Bedside Nurse, Patient and Patient's Family.    aMrguerite Nunez, [facility]  Internal Medicine Staff Hospitalist Service  [facility]  Pager: [phone]  Please see sticky note for cross cover information    Interval History     Sandy is doing OK today.  She continually asks to eat and it is offered to her but then she refuses.  She is easily irritated with questioning.  Denies any complaints but per family, is experiencing some abd pain and back pain.  Family interested in oral dosing of pain medications.     Data reviewed today: I reviewed all medications, new labs and imaging results over the last 24 hours. I personally reviewed no images or EKG's today.    Physical Exam   Vital Signs: Temp: 98.2  F (36.8  C) Temp src: Axillary BP: 138/66   Heart Rate: 83 Resp: 18 SpO2: 97 % O2 Device: Nasal cannula Oxygen Delivery: 3 LPM  Weight: 167 lbs 1.6 oz    GENERAL: Alert and oriented x 1.  Sitting up in bed chatting with her family.  Appears comfortable.   HEENT: Anicteric sclera. Mucous membranes dry.  CV: RRR. S1, S2. No murmurs appreciated.   RESPIRATORY: Effort normal on RA.  Diminished BS in bases bilaterally.  GI: Abdomen soft and non distended, bowel sounds present. Tender to light palpation in mid abdomen.  No rebound, guarding or s/s of peritonitis.   MUSCULOSKELETAL: No joint swelling or tenderness.   NEUROLOGICAL: No focal deficits. Moves all extremities.   EXTREMITIES: No peripheral edema. Intact bilateral pedal pulses.   SKIN: No jaundice. No rashes.       Data   Medications        heparin lock flush  5-10 mL Intracatheter Q24H     OLANZapine zydis  5 mg Oral At Bedtime     melatonin  3 mg Oral At Bedtime     Data     Recent Labs  Lab 11/24/17  0742 11/23/17  0747 11/22/17  0833   WBC 9.3 7.8 8.1   HGB 8.3* 8.1* 8.5*   MCV 89 87 88    163 171    141 141   POTASSIUM 3.5 3.7 3.3*   CHLORIDE 115* 116* 113*   CO2 19* 17*  19*   BUN 19 22 24   CR 3.56* 3.46* 3.62*   ANIONGAP 8 8 9   OKSANA 7.9* 7.9* 8.0*   * 123* 122*     No results found for this or any previous visit (from the past 24 hour(s)).[GT1.1]     Physician Attestation   I, Jace Hernández, saw and evaluated Sandy Sheffield as part of a shared visit.  I have reviewed and discussed with the advanced practice provider their history, physical and plan.    I personally reviewed the vital signs, medications, labs and imaging.    My key history or physical exam findings: Patient seen and examined today.  Currently resting comfortably, in no distress.  Denies pain.    PE:   VS: Afebrile and stable  GEN: NAD  CV: Rest of exam defered for patient comfort    Key management decisions made by me: Ms Sheffield is an 85 year old with HTN, recent AAA repair who presented with acute delirium and findings consistent with an endograft infection which lacked available definitive treatment.  Currently on comfort cares, working on hospice replacement.    Jace Hernández  Date of Service (when I saw the patient):[MU1.1] 11/26/17[MU1.2]     Revision History        User Key Date/Time User Provider Type Action    > MU1.2 11/26/2017  9:09 PM Jace Hernández MD Physician Sign     MU1.1 11/26/2017  9:06 PM Jace Hernández MD Physician      GT1.1 11/26/2017  4:22 PM Marguerite Nunez NP Nurse Practitioner Sign            Progress Notes by Anjali Butler RN at 11/26/2017  8:53 AM     Author:  Anjali Butler RN Service:  Oncology Author Type:  Registered Nurse    Filed:  11/26/2017  8:54 AM Date of Service:  11/26/2017  8:53 AM Creation Time:  11/26/2017  8:53 AM    Status:  Signed :  Anjali Butler RN (Registered Nurse)         Pt. Scheduled to discharge 11/27 at 1000 to [facility] in [place]. Sitter Dc'd at 0800, VPM in use.[DK1.1]     Revision History        User Key Date/Time User Provider Type Action    > DK1.1 11/26/2017  8:54 AM Anjali Butler, RN  "Registered Nurse Sign            Progress Notes by Han, Soo Yeon, MSW at 11/25/2017  4:36 PM     Author:  Han, Soo Yeon, MSW Service:  Social Work Author Type:      Filed:  11/25/2017  4:43 PM Date of Service:  11/25/2017  4:36 PM Creation Time:  11/25/2017  4:36 PM    Status:  Signed :  Han, Soo Yeon, MSW ()         Social Work Services Progress Note    Hospital Day: 10  Date of Initial Social Work Evaluation:  11/24/2017  Collaborated with:  Daughter    Data:  Weekend SW received page from RN that daughter requested to talk with SW.    Intervention:  Per chart review, referral has been sent for patient to [facility] hospice in anticipation of Monday discharge.  SW met with patient's daughter. Daughter explained that availability at [facility] had not been confirmed for Monday and she would like to \"Know my alternative options to make a plan B.\" Daughter requested lists of residential hospice facilities as well as skilled nursing facilities near the [facility].  She indicated concerns that [facility] would be too far away from family and that patient would have more visitors if she went to a facility closer to home.  SW reviewed insurance coverage with the family, indicating patient would likely have out of pocket room and board costs with any facility other than [facility].  They expressed understanding. Lists were provided.  Family indicated they are just considering their options at this time and did not request any additional referrals be made.    Assessment:  Family requested lists of other residential hospice facilities and nursing homes as they had expressed some concerns about OL location and availability for Monday.    Plan:    Anticipated Disposition:  [facility] vs other residential hospice/nursing home    Barriers to d/c plan:  availability    Follow Up:  JIA will continue to follow for discharge planning.    Soo Yeon Han, LICSW  Weekend Pager: " [phone][SH1.1]         Revision History        User Key Date/Time User Provider Type Action    > SH1.1 11/25/2017  4:43 PM Han, Soo Yeon, MSW  Sign            Progress Notes by Jace Hernández MD at 11/25/2017  2:44 PM     Author:  Jace Hernández MD Service:  Hospitalist Author Type:  Physician    Filed:  11/25/2017  4:06 PM Date of Service:  11/25/2017  2:44 PM Creation Time:  11/25/2017  2:44 PM    Status:  Signed :  Jace Hernández MD (Physician)            General acute hospital, [place]  Internal Medicine Progress Note - Gold Service      Assessment & Plan   Sandy Sheffield is a 85 year old female admitted on 11/16/2017. She has a history of HTN, DM type II, CKD, CAD, COPD, lupus, and AAA s/p endovascular repair (10/7/2017) c/b CVA and is admitted for suspected infection of her aortic graft and SANDRO. Hospital stay c/b acute delirium. PET-CT on 11/22 confirmed likely infection of her graft.      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 Casillas. Presented 11/16 with increased abdominal pain, weight loss, fatigue, and necrotic lesions on toes.  CT A/P with concern for infected graft, no e/o leak.  Vascular surgery consulted and patient felt not to be a good candidate for explantation, recommended conservative treatment.  ID consulted and following: has been maintained on IV Vanco and IV Zosyn.  Inflammatory markers improving, however, patient has not clinically improved.  PET-CT completed on 11/22 with final read 11/24 which findings consistent with infected graft with need for life long antibiotics if full medical care is pursued. Care conference held 11/24 with Palliative and daughter, Mary to explain findings.  Met with family (Mary) as well as 2 brothers again today and given the patient's  lack of improvement and advanced age, they have elected to change her goals of care to comfort.   - D/C Zosyn  - Change goals of care to comfort care   - Social work following closely for hospice placement, currently looking at [facility] in [place]  - Pain control: PRN IV Dilaudid, will change to oral tomorrow in preparation for D/C to hospice in coming days  - Limit lab draws unless significant clinical change  - Appreciate ongoing input from palliative care, will discuss with them on Monday      Acute metabolic encephalopathy with delirium - Acute confusion noted during recent hospitalization for AAA repair and CVA. Gradual improvement by the time of discharge with further improvement at TCU. Daughter notes worsening confusion upon transfer to assisted living. Patient with suspected metabolic encephalopathy in setting of infection with acute hospital induced delirium. No electrolyte abnormalities. TSH elevated but free T4 normal. B12 elevated. No other clear source of infection, therefore if graft infection ruled out then likely hospital induced encephalopathy/delirium. Discussed with family who feel that current mental status not felt to be quality lifestyle. Exploring possible hospice. Agitation appears to be improved on scheduled zyprexa.  - Cont delirium precautions  - Cont zyprexa 5mg QHS and BID PRN  - D/C Haldol as increased agitation   - Avoid benzos (noted to have increased agitation following ativan)  - Ensure sleep hygiene and limit disruptions at night time---this was discussed with family today also.       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 Concern for thromboembolic event in setting of recent endovascular repair. Both toes with dark discoloration/eschar of tips, stable. Sensation intact in distal extremities, + pedal pulses.  - D/C doppler checks   - Vascular surgery following   - Pain control as  above      SANDRO on CKD - Creatinine elevated at 4.27 on admission. Initial improvement w IVF, now stable at . Baseline Cr of approx 2.2-2.4. Left renal stent placed on 10/7. Renal US on admission with patent blood flow to both kidneys. Likely SANDRO multifactorial in setting of hypovolemia . UOP improved. Per discussion w family, would not be interested in HD if declining.  - Cont IV D5 1/2 NS + 20 mEq KCl @ 75 cc/hour  - I&O, daily weights  - Renally dose medications  - Avoid nephrotoxic agents as able  - No further lab checks unless absolutely needed       Dysphagia with possible acute aspiration event - Concern for possible aspiration event overnight 11/17. CXR revealed increased moderate right pleural effusion with overlying atelectasis or infection. Afebrile. WBC normal. Vitals stable. SLP consulted. Currently stable on DD1 diet. Ongoing titration of supplemental 02. On broad spectrum abx which would adequately cover CAP and aspiration pneumonia. Given improving CRP, WBC, and stable O2 sats on room air, PNA felt to be less likely.  Family has declined feeding tube.   - Cont DD1 diet but OK to have ice chips per family (ok per family despite aspiration risk)  - SLP following     Recent posterior circulation CVA - MRI brain 10/11 following AAA repair revealed abnormal diffusion restriction in left > right occipital lobes and bilateral inferior cerebellar hemispheres c/w acute infarct. Felt possibly related to endovascular procedure. Cardioembolic source felt to be less likely. Repeat echo 11/16 negative for vegetations. Started on ASA and high-dose statin for stroke prophylaxis. Patient with some residual cognitive deficits.   - Cont PT/OT/SLP  - ASA and statin on hold d/t dysphagia      Normocytic Anemia - Most recent Hgb 8.3 (8.1), BL ~10-11 although limited data. No acute s/s of bleeding since admission. Likely anemia in setting of recent surgery, poor nutrition.      Hypokalemia - Likely d/t poor PO intake.   Maintained on replacement protocol.      Chronic Medical Problems:  Lupus. No records on file. No PTA medications. Elevated ESR and CRP on admission felt to be d/t infectious source. Nothing clearly pointing to lupus at this time. Would consider further evaluation if infectious work up negative.   DM2. A1C 6.7% this admission. Diet controlled. Hyperglycemic on admission. Now well controlled on medium intensity SSI.   COPD. No wheezing on exam. Cont albuterol PRN.   CAD.  ASA on hold d/t aspiration risk.     Diet: Dysphagia Diet Level 1 Pureed Honey Thickened Liquids (pre-thickened or use instant food thickener) (Use spoon only - no cup or straw)  Room Service  Snacks/Supplements Adult: Magic Cup; Between Meals  Fluids: D50.45NS at 75mL/hour   DVT Prophylaxis: Pneumatic Compression Devices  Code Status: Comfort Care    Disposition Plan   Expected discharge: 2 - 3 days, recommended to likely hospice facility pending family's decision once pending family discussion. .     Entered: Marguerite Nunez 11/25/2017, 2:44 PM   Information in the above section will display in the discharge planner report.      The patient's care was discussed with the Attending Physician, Dr. Hernández, Bedside Nurse, Care Coordinator/, Patient and Patient's Family.    Marguerite Nunez, [facility]  Internal Medicine Staff Hospitalist Service  [facility]  Pager: [phone]  Please see sticky note for cross cover information    Interval History   Patient is not able to participate in a full ROS.  Much more awake today and interactive with family in room. Confused.  Denies abd pain, feels comfortable..  Notes a sore throat with dry mouth.  Had one episode of acute agitation last night 2/2 her sons arriving to the bedside and waking her to visit.      Data reviewed today: I reviewed all medications, new labs and imaging results over the last 24 hours. I personally reviewed no images or EKG's today.    Physical Exam   Vital  Signs: Temp: 98.8  F (37.1  C) Temp src: Oral BP: 139/70 Pulse: 111 Heart Rate: 86 Resp: 16 SpO2: 98 % O2 Device: Nasal cannula Oxygen Delivery: 2 LPM  Weight: 167 lbs 1.6 oz    GENERAL: Alert and oriented x 1.  Sitting up in bed chatting with her family.  Appears comfortable.   HEENT: Anicteric sclera. Mucous membranes dry.  CV: RRR. S1, S2. No murmurs appreciated.   RESPIRATORY: Effort normal on RA.  Diminished BS in bases bilaterally.  GI: Abdomen soft and non distended, bowel sounds present. Tender to light palpation in mid abdomen.  No rebound, guarding or s/s of peritonitis.   MUSCULOSKELETAL: No joint swelling or tenderness.   NEUROLOGICAL: No focal deficits. Moves all extremities.   EXTREMITIES: No peripheral edema. Intact bilateral pedal pulses.   SKIN: No jaundice. No rashes.       Data   Medications     dextrose 5% and 0.45% NaCl + KCl 20 mEq/L 75 mL/hr at 11/24/17 1701[GT1.1]       sodium chloride (PF)  10 mL Intracatheter Q8H     heparin lock flush  5-10 mL Intracatheter Q24H     OLANZapine zydis  5 mg Oral At Bedtime     melatonin  3 mg Oral At Bedtime     lidocaine  1 patch Transdermal Q24h    And     lidocaine   Transdermal Q24H    And     lidocaine   Transdermal Q8H     sodium chloride (PF)  10 mL Intracatheter Q8H     sodium chloride (PF)  10 mL Intracatheter Q8H     sodium chloride (PF)  10 mL Intracatheter Q8H     pneumococcal vaccine  0.5 mL Intramuscular Prior to discharge     sodium chloride (PF)  3 mL Intracatheter Q8H[GT1.2]     Data[GT1.1]     Recent Labs  Lab 11/24/17  0742 11/23/17  0747 11/22/17  0833   WBC 9.3 7.8 8.1   HGB 8.3* 8.1* 8.5*   MCV 89 87 88    163 171    141 141   POTASSIUM 3.5 3.7 3.3*   CHLORIDE 115* 116* 113*   CO2 19* 17* 19*   BUN 19 22 24   CR 3.56* 3.46* 3.62*   ANIONGAP 8 8 9   OKSANA 7.9* 7.9* 8.0*   * 123* 122*     No results found for this or any previous visit (from the past 24 hour(s)).[GT1.2]     Physician Attestation   I, Jace James  Edgar, saw and evaluated Sandy Sheffield as part of a shared visit.  I have reviewed and discussed with the advanced practice provider their history, physical and plan.    I personally reviewed the vital signs, medications, labs and imaging.    My key history or physical exam findings: Patient seen and examined today.  Clinically stable, and pleasantly confused.  Denies HA, dizziness, CP, N/V or other symptoms.    PE:   VS: Afebrile and stable  GEN: NAD  CV: RRR S1/S2, no m,r,g  Pulm: CTA b/l  Abd: soft, still with abdominal pain, ND, +BS  Ext: warm and well perfused, no edema    Key management decisions made by me: PET/CT consistent with an endograft infection.  Given age, frailty, comorbidities, there is no definitive treatment for this.  Following discussion with family regarding goals of care, and they elected for comfort.      Jace Hernández  Date of Service (when I saw the patient):[MU1.1] 11/25/17[MU1.2]     Revision History        User Key Date/Time User Provider Type Action    > MU1.2 11/25/2017  4:06 PM Jace Hernández MD Physician Sign     MU1.1 11/25/2017  4:04 PM Jace Hernández MD Physician      GT1.2 11/25/2017  2:59 PM Marguerite Nunez NP Nurse Practitioner Sign     GT1.1 11/25/2017  2:44 PM Marguerite Nunez NP Nurse Practitioner             Progress Notes by Jace Sagastume MD at 11/25/2017  7:50 AM     Author:  Jace Sagastume MD Service:  Vascular Surgery Author Type:  Physician    Filed:  11/25/2017  8:26 AM Date of Service:  11/25/2017  7:50 AM Creation Time:  11/25/2017  7:50 AM    Status:  Addendum :  Jace Sagastume MD (Physician)         Vascular Surgery Progress Note     Notified that family is pursuing comfort care.     Vascular Surgery will sign off. Please re-consult with questions/concerns.     Dhaval Carcamo MD  Vascular Surgery Fellow  Pager: [phone][CO1.1]    As above. PET-CT reviewed abd and worrisome for graft infection.    Comfort care  per notes.    Call if questions[MH1.1]    Jace SAGASTUME  11/25/2017[MH1.2].[MH1.1]  8:26 AM[MH1.2]       Revision History        User Key Date/Time User Provider Type Action    > MH1.2 11/25/2017  8:26 AM Jace Sagastume MD Physician Addend     MH1.1 11/25/2017  8:21 AM Jace Sagastume MD Physician      CO1.1 11/25/2017  7:51 AM Dhaval Carcamo MD Resident Sign            Progress Notes by Sarita Lemus RN at 11/25/2017  2:00 AM     Author:  Sarita Lemus RN Service:  (none) Author Type:  Registered Nurse    Filed:  11/25/2017  7:46 AM Date of Service:  11/25/2017  2:00 AM Creation Time:  11/25/2017  7:45 AM    Status:  Signed :  Sarita Lemus RN (Registered Nurse)         Pt's two sons arrived @approx 0115 this am. Pt was sleeping comfortably before arrival, sons woke up pt to visit. Pt agitated upon waking. Sons eventually calmed down pt successfully.[DR1.1]      Revision History        User Key Date/Time User Provider Type Action    > DR1.1 11/25/2017  7:46 AM Sarita Lemus RN Registered Nurse Sign            Progress Notes by Dhaval Carcamo MD at 11/24/2017 10:15 AM     Author:  Dhaval Carcamo MD Service:  Vascular Surgery Author Type:  Resident    Filed:  11/24/2017 10:34 AM Date of Service:  11/24/2017 10:15 AM Creation Time:  11/24/2017 10:15 AM    Status:  Attested Addendum :  Dhaval Carcamo MD (Resident)    Cosigner:  Jace Sagastume MD at 11/24/2017  9:53 PM        Attestation signed by Jace Sagastume MD at 11/24/2017  9:53 PM        Attestation:  Physician Attestation   HAYDEE, Jace SAGASTUME, saw this patient with the resident and agree with the resident s findings and plan of care as documented in the resident s note.      I personally reviewed imaging and pt exam.    Key findings: reviewed PET-CT findings extensively with radiology staff. Findings concerning for graft / aneurysm infection. Expect supressive lifelong abx but will defer to ID    Jace  "Sherley DAX  Date of Service (when I saw the patient): 11/24/17                               Vascular Surgery Progress Note     Patient seen and evaluated at the bedside this morning. No acute distress. Hallucinations overnight per RN. Agitation paranoia seem to have resolved this morning.[CO1.1]     /90 (BP Location: Left arm)  Pulse 84  Temp 95.9  F (35.5  C) (Axillary)  Resp 20  Ht 5' 8\"  Wt 196 lb 3.4 oz  SpO2 98%  BMI 29.83 kg/m2[CO1.2]  Continues to be afebrile.[CO1.1]     Intake/Output Summary (Last 24 hours) at 11/24/17 1017  Last data filed at 11/24/17 0753   Gross per 24 hour   Intake             1820 ml   Output             1725 ml   Net               95 ml[CO1.2]     General: Elderly  female resting supine in bed on 4L O2  Cor: RRR  Pulm: Breathing is unlabored.   Abd: Reducible incisional abdominal hernia. S/NT/ND  LE: Warm and well perfused. Focal darkened areas of necrosis overlying the right and left great toes. Pulses are palpable bilaterally.       WBC: 9.3  Blood cultures from 11/16 remain negative      Assessment: Mrs. Sheffield is an 84 yo female with history of HTN, HLD, COPD, Posterior circulation CVA, CAD and AAA s/p CHEVAR 10/07 admitted with positive UA and CT evidence concerning for graft infection.  Urine and blood cultures negative.       Plan:     1. Reviewed images with radiology this morning.[CO1.1]  Asymmetrical[CO1.3] Nodular FDG uptake at the proximal left aspect of the graft is Likely consistent with infection. Nodularity is appreciated along the aneurysmal sac wall itself to a greater extent than is realized in the graft...but proximally there is an area of increased signal intensity around the stent that is concerning.  Patient will need lifelong antibiotic therapy. Coverage of S.aureus and S. epidermidis recommended. Will defer to ID.  2. Nutrition Consultation and care appreciated.     Dhaval Carcamo MD   Vascular Surgery Fellow  Pager: [phone][CO1.1]     " Revision History        User Key Date/Time User Provider Type Action    > CO1.3 2017 10:34 AM Dhaval Carcamo MD Resident Addend     CO1.2 2017 10:33 AM Dhaval Carcamo MD Resident Sign     CO1.1 2017 10:15 AM Dhaval Carcamo MD Resident             Progress Notes by Felecia Delgadillo MSW at 2017  2:41 PM     Author:  Felecia Delgadillo MSW Service:  Social Work Author Type:      Filed:  2017  3:31 PM Date of Service:  2017  2:41 PM Creation Time:  2017  2:41 PM    Status:  Signed :  Felecia Delgadillo MSW ()         Social Work: Assessment with Discharge Plan    Patient Name:  Sandy Sheffield  :  1932  Age:  85 year old  MRN:  [id number]  Risk/Complexity Score:     Completed assessment with:  Angy Hernandez    Presenting Information   Reason for Referral:  Discharge plan, Potential need for community services upon discharge and Hospice  Date of Intake:  2017  Referral Source:  Physician, Family and Chart Review  Decision Maker: Per AMIE, Pt's adult children  Alternate Decision Maker:  None identified  Health Care Directive:  Invalid directive per electronic record  Living Situation:  Adult foster home  Previous Functional Status:  Pt had assist with ADL's at baseline.  Patient and family understanding of hospitalization:  Family demonstrates good understanding.  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  Adjustment to Illness:  Unable to assess. Family appears to be coping well, and were understanding of Pt's prognosis and life expectancy.    Physical Health  Reason for Admission:  No diagnosis found.  Services Needed/Recommended:  Hospice    Mental Health/Chemical Dependency  Diagnosis:  None  Support/Services in Place:  n/a  Services Needed/Recommended:  n/a    Support System  Significant relationship at present time:  Yes, Pt's children are involved and supportive.  Family of origin  is available for support:  Yes  Other support available:  Yes  Gaps in support system:  No  Patient is caregiver to:  None     Provider Information   Primary Care Physician:  Mo Louis   [phone]   Clinic:  [facility] [address] / [place] *      :  Unknown    Financial   Income Source:  SSI  Financial Concerns:  On Medical Assistance  Insurance:    Payor/Plan Subscriber Name Rel Member # Group #   MEDICARE - MEDICARE P* CHINO JACKSON  [id number]       ATTN CLAIMS, [address]   BCBS - [employer] EMPLOY* CHINO JACKSON  [id number]      [address] [address]       Discharge Plan   Patient and family discharge goal:  JIA met with Pt's dtr Mary today to discuss d/c planning. Mary and family are hoping that Pt will be able to d/c to hospice home. SW explained options and family would like to pursue placement at [facility] if there is availability.    JIA spoke with Diann at [facility] and she indicated that there may be available beds next week. Per medical team, Pt likely ready to d/c from hospital on Monday.     JIA spoke with STEPHIE Everett who filled out required ppwk for [facility] hosp[facility]. SW faxed entire referral today; awaiting confirmation if they can accommodate Pt on Monday.  Provided education on discharge plan:  YES  General information regarding anticipated insurance coverage and possible out of pocket cost was discussed. Patient and patient's family are aware patient may incur the cost of transportation to the facility, pending insurance payment: YES  Barriers to discharge:  Medical stability, placement, coordination of hospice services    Discharge Recommendations   Anticipated Disposition:  TBD - hopefully [facility]  Transportation Needs:  Medical:  Stretcher  Name of Transportation Company and Phone:  [employer] (Ph: [phone])    Additional comments   JIA met with Pt's dtr today following discussion with STEPHIE Everett.  Medical team recommending hospice at d/c. Pt's family is in agreement with this recommendation, and are hoping to transition Pt to a hospice home.    SW discussed finances re: hospice homes. They are unable to afford the cost of room and board. SW explained that [facility] hospice home does not charge for room and board for low income Pt's. They would like to pursue placement at [facility].     SW made referral today to [facility] [place] @ [facility]; awaiting confirmation if they can accept Pt on Monday. SW to continue to follow and assist with safe d/c planning.    SIMRAN Bonds, [facility]  7C Surgical Oncology Unit   [phone]  Pager: [phone][SS1.1]       Revision History        User Key Date/Time User Provider Type Action    > SS1.1 11/24/2017  3:31 PM Felecia Delgadillo MSW  Sign            Progress Notes by Nuria Pickering RD at 11/24/2017 11:20 AM     Author:  Nuria Pickering RD Service:  Nutrition Author Type:  Registered Dietitian    Filed:  11/24/2017 11:30 AM Date of Service:  11/24/2017 11:20 AM Creation Time:  11/24/2017 11:20 AM    Status:  Signed :  Nuria Pickering RD (Registered Dietitian)         Clinical Nutrition Services- Brief Note    Pt continues on dysphagia level 1 diet and honey thickened liquids.    Calorie counts poor: 3 day average 181kcals and 5g protein.    Per notes family waiting on final read of PET CT scan to determine plan with possible care conference on Monday.    RECOMMENDATIONS  -Pt with very poor oral intake for 8 days this admit and likely PTA. Pending GOC recommend starting nutrition support to meet nutritional needs. Ideally enteral nutrition appropriate as GIT functioning. If unable to get feeding tube access could consider parenteral nutrition.     Please refer to nutrition note from 11/20/17 for full nutritional assessment.    The above note was completed by Clau Carvajal RD, LD Pager [phone][SG1.1]           Revision History        User Key Date/Time  User Provider Type Action    > SG1.1 11/24/2017 11:30 AM Nuria Pickering RD Registered Dietitian Sign            Progress Notes by Denise Rodriguez at 11/24/2017  7:32 AM     Author:  Denise Rodriguez Service:  (none) Author Type:  Nutrition Associate    Filed:  11/24/2017  7:32 AM Date of Service:  11/24/2017  7:32 AM Creation Time:  11/24/2017  7:32 AM    Status:  Signed :  Denise Rodriguez (Nutrition Associate)         Calorie Counts    Intake recorded for: 11/23 Kcals: 88 Protein: 2g    # of meals recorded: less than 25% magic cup, mashed potatoes, cranberry juice    # of supplements recorded: 0[AG1.1]     Revision History        User Key Date/Time User Provider Type Action    > AG1.1 11/24/2017  7:32 AM Denise Rodriguez Nutrition Associate Sign                  Procedure Notes     No notes of this type exist for this encounter.      Progress Notes - Therapies (Notes from 11/24/17 through 11/27/17)     No notes of this type exist for this encounter.

## 2017-11-16 NOTE — IP AVS SNAPSHOT
"    UNIT [address] [facility]: [phone]                                              INTERAGENCY TRANSFER FORM - LAB / IMAGING / EKG / EMG RESULTS   2017                    Hospital Admission Date: 2017  CHINO JACKSON   : 1932  Sex: Female        Attending Provider: Jcae Hernández MD     Allergies:  Ibuprofen, Penicillins, Shrimp, Tylenol [Acetaminophen]    Infection:  None   Service:  INTERNAL MED    Ht:  1.727 m (5' 8\")   Wt:  75.8 kg (167 lb 1.6 oz)   Admission Wt:  83.3 kg (183 lb 11.2 oz)    BMI:  25.41 kg/m 2   BSA:  1.91 m 2            Patient PCP Information     Provider PCP Type    CORY GIBSON [facility]         Lab Results - 3 Days      Glucose by meter [[id number]] (Abnormal)  Resulted: 17 0736, Result status: Final result    Ordering provider: Joanna Carlson MD  17 0744 Resulting lab: POINT OF CARE TEST, GLUCOSE    Specimen Information    Type Source Collected On     17 0744          Components       Value Reference Range Flag Lab   Glucose 107 70 - 99 mg/dL H 170            Glucose by meter [[id number]] (Abnormal)  Resulted: 17 1420, Result status: Final result    Ordering provider: Joanna Carlson MD  17 014 Resulting lab: POINT OF CARE TEST, GLUCOSE    Specimen Information    Type Source Collected On     17 014          Components       Value Reference Range Flag Lab   Glucose 108 70 - 99 mg/dL H 170            Glucose by meter [[id number]] (Abnormal)  Resulted: 17 0050, Result status: Final result    Ordering provider: Joanna Carlson MD  17 Resulting lab: POINT OF CARE TEST, GLUCOSE    Specimen Information    Type Source Collected On     17          Components       Value Reference Range Flag Lab   Glucose 114 70 - 99 mg/dL H 170   Comment:  /RN Notified            Glucose by meter [[id number]]  Resulted: 17, Result status: Final result    Ordering provider: " Joanna Carlson MD  11/24/17 1133 Resulting lab: POINT OF CARE TEST, GLUCOSE    Specimen Information    Type Source Collected On     11/24/17 1133          Components       Value Reference Range Flag Lab   Glucose 94 70 - 99 mg/dL  170            Glucose by meter [[id number]] (Abnormal)  Resulted: 11/24/17 2236, Result status: Final result    Ordering provider: Joanna Carlson MD  11/24/17 0429 Resulting lab: POINT OF CARE TEST, GLUCOSE    Specimen Information    Type Source Collected On     11/24/17 0429          Components       Value Reference Range Flag Lab   Glucose 121 70 - 99 mg/dL H 170   Comment:  Dr/RN Notified            Glucose by meter [[id number]] (Abnormal)  Resulted: 11/24/17 2236, Result status: Final result    Ordering provider: Joanna Carlson MD  11/23/17 2015 Resulting lab: POINT OF CARE TEST, GLUCOSE    Specimen Information    Type Source Collected On     11/23/17 2015          Components       Value Reference Range Flag Lab   Glucose 122 70 - 99 mg/dL H 170            Glucose by meter [[id number]] (Abnormal)  Resulted: 11/24/17 2236, Result status: Final result    Ordering provider: Joanna Carlson MD  11/23/17 1626 Resulting lab: POINT OF CARE TEST, GLUCOSE    Specimen Information    Type Source Collected On     11/23/17 1626          Components       Value Reference Range Flag Lab   Glucose 133 70 - 99 mg/dL H 170            Glucose by meter [[id number]] (Abnormal)  Resulted: 11/24/17 1650, Result status: Final result    Ordering provider: Joanna Carlson MD  11/24/17 1647 Resulting lab: POINT OF CARE TEST, GLUCOSE    Specimen Information    Type Source Collected On     11/24/17 1647          Components       Value Reference Range Flag Lab   Glucose 125 70 - 99 mg/dL H 170            Basic metabolic panel [[id number]] (Abnormal)  Resulted: 11/24/17 0845, Result status: Final result    Ordering provider: Dimitry Grande PA-C  11/24/17 0101  Resulting lab: [facility]    Specimen Information    Type Source Collected On   Blood  11/24/17 0742          Components       Value Reference Range Flag Lab   Sodium 142 133 - 144 mmol/L  51   Potassium 3.5 3.4 - 5.3 mmol/L  51   Chloride 115 94 - 109 mmol/L H 51   Carbon Dioxide 19 20 - 32 mmol/L L 51   Anion Gap 8 3 - 14 mmol/L  51   Glucose 122 70 - 99 mg/dL H 51   Urea Nitrogen 19 7 - 30 mg/dL  51   Creatinine 3.56 0.52 - 1.04 mg/dL H 51   GFR Estimate 12 >60 mL/min/1.7m2 L 51   Comment:  Non  GFR Calc   GFR Estimate If Black 15 >60 mL/min/1.7m2 L 51   Comment:  African American GFR Calc   Calcium 7.9 8.5 - 10.1 mg/dL L 51            CBC with platelets [[id number]] (Abnormal)  Resulted: 11/24/17 0818, Result status: Final result    Ordering provider: Dimitry Grande PA-C  11/24/17 0101 Resulting lab: [facility]    Specimen Information    Type Source Collected On   Blood  11/24/17 0742          Components       Value Reference Range Flag Lab   WBC 9.3 4.0 - 11.0 10e9/L  51   RBC Count 3.07 3.8 - 5.2 10e12/L L 51   Hemoglobin 8.3 11.7 - 15.7 g/dL L 51   Hematocrit 27.2 35.0 - 47.0 % L 51   MCV 89 78 - 100 fl  51   MCH 27.0 26.5 - 33.0 pg  51   MCHC 30.5 31.5 - 36.5 g/dL L 51   RDW 16.3 10.0 - 15.0 % H 51   Platelet Count 160 150 - 450 10e9/L  51            Testing Performed By     Lab - Abbreviation Name Director Address Valid Date Range    51 - Unknown [facility] Unknown [address] [facility] [address] 12/31/14 1010 - Present    170 - Unknown POINT OF CARE TEST, GLUCOSE Unknown Unknown 10/31/11 1114 - Present            Unresulted Labs     None         Imaging Results - 3 Days      PET Oncology Whole Body [[id number]]  Resulted: 11/24/17 1117, Result status: Final result    Ordering provider: Dimitry Grande PA-C  11/20/17 0255 Resulted by: Zaki Solis MD Boegel, Kevin  MD DOUG    Performed: 11/22/17 1504 - 11/22/17 1702 Resulting lab: RADIOLOGY RESULTS    Narrative:       Combined Report of:    PET and CT on  11/22/2017 5:02 PM :    1. PET of the neck, chest, abdomen, and pelvis.  2. PET CT Fusion for Attenuation Correction and Anatomical  Localization:    3. Diagnostic CT scan of the chest, abdomen, and pelvis without  intravenous contrast for interpretation.  3. CT of the chest, abdomen and pelvis obtained for diagnostic  interpretation.  4. 3D MIP and PET-CT fused images were processed on an independent  workstation and archived to PACS and reviewed by a radiologist.    Technique:    1. PET: The patient received 12.9 mCi of F-18-FDG; the serum glucose  was 113 prior to administration, body weight was 89 kg. Images were  evaluated in the axial, sagittal, and coronal planes as well as the  rotational whole body MIP. Images were acquired from the Vertex to the  Feet.    UPTAKE WAS MEASURED AT 60 MINUTES.     2. CT: Volumetric acquisition for clinical interpretation of the  chest, abdomen, and pelvis acquired at 3 mm sections . The chest,  abdomen, and pelvis were evaluated at 5 mm sections in bone, soft  tissue, and lung windows. Patient was unable to hold her breath for  the breath hold images of the chest and lung windows.     3. 3D MIP and PET-CT fused images were processed on an independent  workstation and archived to PACS and reviewed by a radiologist.    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 Blood cultures NGTD. PET-CT recommended to further  rule in/out infection     ADDITIONAL INFORMATION OBTAINED FROM EMR: AAA s/p endovascular repair  (10/7/2017    COMPARISON: CT 11/16/2017    FINDINGS:     HEAD/NECK:  There is no  suspicious FDG uptake in the neck.     Confluent periventricular and subcortical white matter hypodensities  are nonspecific but favored to represent sequelae of chronic small  vessel ischemic disease. Bilateral  occipital lobe hypodensities are  compatible with prior infarcts, as seen on MRI 10/11/2017.    The paranasal sinuses are clear. The mastoid air cells are clear.     The mucosal pharyngeal space, the , prevertebral and carotid  spaces are within normal limits.     No masses, mass effect or pathologically enlarged lymph nodes are  evident. Diminutive thyroid gland without focal abnormality.    CHEST:  Partially visualized PICC in the right upper extremity.    In the peripheral right upper lobe, there is a small area of increased  FDG uptake (axial series 3, image 130), SUV max of 4.5.    Prominent mediastinal lymph nodes without FDG uptake. Heart is normal  in size. Lipomatous hypertrophy of interatrial septum. There are dense  coronary calcifications involving the right and left coronary artery  distributions. No pericardial effusion. Moderate loculated right  pleural effusion and small loculated left pleural effusion. Right  basilar calcifications. Centrilobular and paraseptal emphysematous  changes of the lungs. There is a 6 mm subpleural left lower lobe  pulmonary nodule.    ABDOMEN AND PELVIS:  Post surgical changes of abdominal aortic aneurysm endovascular repair  and left renal artery stent placement. Near the aneurysm neck adjacent  to the aortic graft and inferior to the renal artery stent, there is a  focal area of FDG uptake, SUV max 6.3. Additionally, there are nodular  areas of increased FDG uptake along the left lateral aneurysm sac  wall, SUV max 5.5. The aneurysm sac has a maximum diameter of 9.4 cm  (axial series 3, image 211), previously 9.2 cm. There is a small focus  of gas within the excluded aneurysm sac, decreased from 11/16/2017.  Mild inflammatory changes surrounding the abdominal aortic aneurysm.    There is a focal area of FDG uptake in the sigmoid colon, SUV max  11.3.    Cholecystectomy clips. The liver, adrenal glands, pancreas, and small  bowel are unremarkable. Calcified  splenic granulomas. Spleen is normal  in size. Atrophic kidneys with simple fluid density renal cysts. In  the right upper pole, there is a 9 mm exophytic hyperdense lesion.  Colonic diverticulosis. Small amount of simple free pelvic fluid. No  suspicious lymphadenopathy.    LOWER EXTREMITIES:   No abnormal masses or hypermetabolic lesions. The left femoral vein is  diminutive and there is focal dilatation of the distal popliteal vein.  Right femoral arthroplasty appears normal.    BONES:   There is no abnormal FDG uptake in the skeleton. Chronic T12 anterior  compression deformity with a probable inferior endplate Schmorl's  node.      Impression:       IMPRESSION:   1. Postsurgical changes of the AAA endovascular repair and left renal  artery stent placement, with the aneurysm sac maximum diameter of 9.4  cm, previously 9.2 cm. There is asymmetric FDG uptake near the  aneurysm neck adjacent to the aortic graft and inferior to the renal  artery stent, and additional nodular areas of FDG uptake along the  left lateral aneurysm sac wall, with associated mild periaortic fat  stranding and a small focus of gas in the excluded aneurysm sac. 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 The most suspicious area is  just superior to the left renal artery stent along the left side.    2. Small area of increased FDG uptake in the peripheral right upper  lobe. Differential includes loculated pleural fluid, atelectasis,  infection, or infarction.    3. Focal area of FDG uptake in the sigmoid colon. Differential  includes benign and malignant etiologies. If clinically indicated,  colonoscopy may be considered.    4. Bilateral loculated pleural effusions, right greater than left.    5. Diminutive left femoral vein and focal dilatation of the distal  popliteal vein, possibly representing chronic venous thrombosis.  Consider lower extremity venous  ultrasound.    6. Large multifocal coronary artery calcifications.    7. Atrophic kidneys with simple fluid density cysts. There is an  additional 9 mm exophytic hyperdense lesion in the right upper pole  which is too small to accurately characterize.    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    I have personally reviewed the examination and initial interpretation  and I agree with the findings.    JUAN CARLOS ARREDONDO MD      Testing Performed By     Lab - Abbreviation Name Director Address Valid Date Range    104 - Rad Rslts RADIOLOGY RESULTS Unknown Unknown 05 1553 - Present               ECG/EMG Results      Echocardiogram Limited [[id number]]  Resulted: 17 1109, Result status: Edited Result - FINAL    Ordering provider: Alison Ojeda PA-C  17 0005 Resulted by: Sony Freeman MD    Performed: 17 1128 - 17 1138 Resulting lab: RADIOLOGY RESULTS    Narrative:       [id number]  [id number]  [id number]  [id number]^ODETTE^ALISON^RON           [facility]  Echocardiography Laboratory  [address] [place] [id number]     Name: CHINO JACKSON  MRN: [id number]  : 1932  Study Date: 2017 11:09 AM  Age: 85 yrs  Gender: Female  Patient Location: [facility] [place]  Reason For Study: Endocarditis  Ordering Physician: ALISON OJEDA  Referring Physician: SELF, REFERRED  Performed By: Kimberli Sales RDCS     BSA: 2.0 m2  Height: 68 in  Weight: 180 lb  _____________________________________________________________________________  __        Procedure  Limited Echocardiogram with portions of two-dimensional, color and spectral  Doppler performed. Technically difficult study. Poor acoustic windows.  _____________________________________________________________________________  __        Interpretation Summary  No gross evidence of  endocarditis on this transthoracic study. Consider PHANI if  clinical suspicion warrants.     Left ventricular function, chamber size, wall motion, and wall thickness are  normal.The EF is 60-65%.  Probably normal RV size and function.  The inferior vena cava was normal in size with preserved respiratory  variability. Estimated mean right atrial pressure is 3 mmHg  This study was compared with the study from 10/14/17: There has been no  change.  _____________________________________________________________________________  __        Left Ventricle  Left ventricular function, chamber size, wall motion, and wall thickness are  normal.The EF is 60-65%.     Right Ventricle  Probably normal RV size and function.     Atria  The atria cannot be assessed.        Mitral Valve  The mitral valve is normal.     Aortic Valve  The aortic valve is not well-assessed but shows no gross evidence of  vegetation. Mild aortic valve sclerosis is present.     Tricuspid Valve  The tricuspid valve is normal.     Pulmonic Valve  The pulmonic valve cannot be assessed.     Vessels  The aorta root is normal. The thoracic aorta is normal. The pulmonary artery  cannot be assessed. The inferior vena cava was normal in size with preserved  respiratory variability. Estimated mean right atrial pressure is 3 mmHg.     Pericardium  No pericardial effusion is present.        Compared to Previous Study  This study was compared with the study from 10/14/17 . There has been no  change.  _____________________________________________________________________________  __                          Report approved by: Lorena Rico 11/19/2017 02:11 PM              _____________________________________________________________________________  __       1    Type Source Collected On     11/19/17 1109          View Image (below)        Echocardiogram Complete [[id number]]  Resulted: 11/19/17 1128, Result status: In process    Ordering provider: Lynette  Sakshi Schneider PA-C  11/18/17 0005 Performed: 11/19/17 1128 - 11/19/17 1128    Resulting lab: [facility]                   Encounter-Level Documents:     There are no encounter-level documents.      Order-Level Documents:     There are no order-level documents.

## 2017-11-16 NOTE — PROGRESS NOTES
Spoke with Sandy's daughter Mary Strickland regarding patient progress.  Sandy has left the TCU and is now in an assisted living, [facility] in [place].  Mary is concerned because she does not think that pt is doing well.  She has been complaining of sharp pains to her upper abdomen, near diaphragm worsening for 2 weeks.  Patient has lost 17 lbs since discharge from hospital and poor appetite.  She has been given nutritional shakes, but intake is poor. Daughter also reports that the tips of her big toe to right foot has been turning black, possible dry gangrene.  Other toes on right foot are cool to touch and red/purple.  Now the left toes are also cool.    Sandy has been seen by a PCP at [facility].  Medications were adjusted.  Patient was discharged on PRN Xanax, but should have been scheduled at evening (she had been on Xanax prior to hospital admission).  The PCP also added on oxycodone PRN.  I asked Mary to monitor if her mom would improve with that medication adjustment.    We did speak about coming in to see Vascular Surgery for follow up visit with Dr Casillas and CT scan.  Mary did not know how she would get her mother to the appointment since she was unable to get out of bed.  I did ask her to speak to care facility to look into having more PCP visit, since her mother also needs a good PCP to look at all her chronic issues along with medications.  She agreed to ask Facility for options.    Meanwhile, I will speak to Dr Casillas on his return from conference for guidance.  Also messaged Jessica Moya NP.

## 2017-11-16 NOTE — IP AVS SNAPSHOT
MRN:[id number]                      After Visit Summary   11/16/2017    Sandy Sheffield    MRN: [id number]           Thank you!     Thank you for choosing [facility] for your care. Our goal is always to provide you with excellent care. Hearing back from our patients is one way we can continue to improve our services. Please take a few minutes to complete the written survey that you may receive in the mail after you visit with us. Thank you!        Patient Information     Date Of Birth          11/5/1932        Designated Caregiver       Most Recent Value    Caregiver    Will someone help with your care after discharge? yes    Name of designated caregiver Mary    Phone number of caregiver [phone]    Caregiver address [address] [place] [address]      About your hospital stay     You were admitted on:  November 16, 2017 You last received care in the:  [address] [place] [address]    You were discharged on:  November 27, 2017        Reason for your hospital stay       You were admitted to the hospital for abdominal pain and 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  Goals of care have been shifted to comfort measures only.                  Who to Call     For medical emergencies, please call 911.  For non-urgent questions about your medical care, please call your primary care provider or clinic, [phone]          Attending Provider     Provider Specialty    Kam Lockwood MD Internal Medicine    Robyn, Joanna Santana MD Internal Medicine    Edgar, Jace James MD Internal Medicine       Primary Care Provider Office Phone # Fax #    [place] [phone]      After Care Instructions     Activity - Up ad edi           Advance Diet as Tolerated       Follow this diet upon discharge: Per family request, for comfort            Fall precautions           Ireland catheter       To straight gravity drainage. Change catheter every 2 weeks and PRN for leaking or decreased  uring output with signs of bladder distention. DO NOT change catheter without a specific MD order IF diagnosis of benign prostatic hypertrophy (BPH), neurogenic bladder, or other urological conditions            General info for SNF       Length of Stay Estimate: Long Term Care  Condition at Discharge: Terminal  Level of care:skilled   Rehabilitation Potential: Poor  Admission H&P remains valid and up-to-date: Yes  Recent Chemotherapy: N/A  Use Nursing Home Standing Orders: Yes                  Follow-up Appointments     Follow Up and recommended labs and tests       Follow up with hospice team on admission for assessment.                  Your next 10 appointments already scheduled     Nov 30, 2017  3:20 PM CST   CT ABDOMEN PELVIS W/O CONTRAST with UCCT2   [facility] [place] [facility] ([facility])    [address]  1st Floor  [facility] [address] [phone]           Please bring any scans or X-rays taken at other hospitals, if similar tests were done. Also bring a list of your medicines, including vitamins, minerals and over-the-counter drugs. It is safest to leave personal items at home.  Be sure to tell your doctor:   If you have any allergies.   If there s any chance you are pregnant.   If you are breastfeeding.   If you have any special needs.  You may have contrast for this exam. To prepare:   Do not eat or drink for 2 hours before your exam. If you need to take medicine, you may take it with small sips of water. (We may ask you to take liquid medicine as well.)   The day before your exam, drink extra fluids at least six 8-ounce glasses (unless your doctor tells you to restrict your fluids).  Patients over 70 or patients with diabetes or kidney problems:   If you haven t had a blood test (creatinine test) within the last 30 days, go to your clinic or Diagnostic Imaging Department for this test.  If you have diabetes:   If your kidney function is normal, continue taking  "your metformin (Avandamet, Glucophage, Glucovance, Metaglip) on the day of your exam.   If your kidney function is abnormal, wait 48 hours before restarting this medicine.  You will have oral contrast for this exam:   You will drink the contrast at home. Get this from your clinic or Diagnostic Imaging Department. Please follow the directions given.  Please wear loose clothing, such as a sweat suit or jogging clothes. Avoid snaps, zippers and other metal. We may ask you to undress and put on a hospital gown.  If you have any questions, please call the Imaging Department where you will have your exam.            Nov 30, 2017  3:45 PM CST   (Arrive by 3:30 PM)   New Vascular Visit with Laura Casillas MD   [facility] ([facility])    [address] [place] [id number] [phone]              Pending Results     No orders found from 11/14/2017 to 11/17/2017.            Statement of Approval     Ordered          11/27/17 1051  I have reviewed and agree with all the recommendations and orders detailed in this document.  EFFECTIVE NOW     Approved and electronically signed by:  Marguerite Nunez NP             Admission Information     Date & Time Provider Department Dept. Phone    11/16/2017 Jace Hernández MD [address] [facility] [phone]      Your Vitals Were     Blood Pressure Pulse Temperature Respirations Height Weight    138/66 (BP Location: Right leg) 111 98.2  F (36.8  C) (Axillary) 18 1.727 m (5' 8\") 75.8 kg (167 lb 1.6 oz)    Pulse Oximetry BMI (Body Mass Index)                97% 25.41 kg/m2          MyChart Information     [employer] lets you send messages to your doctor, view your test results, renew your prescriptions, schedule appointments and more. To sign up, go to [email or web address] . Click on \"Log in\" on the left side of the screen, which will take you to the Welcome page. Then click on \"Sign up Now\" on the right side of the " page.     You will be asked to enter the access code listed below, as well as some personal information. Please follow the directions to create your username and password.     Your access code is: [email or web address]  Expires: 2018 12:31 PM     Your access code will  in 90 days. If you need help or a new code, please call your [facility] clinic or [phone].        Care EveryWhere ID     This is your Care EveryWhere ID. This could be used by other organizations to access your [facility] medical records  [id number]        Equal Access to Services     [facility]: Hadii kim Harrington, wamily davis, jeff kaalroosevelt rtiana, mercedes cain . So [facility] [phone].    ATENCIÓN: Si habla español, tiene a berry disposición servicios gratuitos de asistencia lingüística. Llame al [phone].    We comply with applicable federal civil rights laws and [place] laws. We do not discriminate on the basis of race, color, national origin, age, disability, sex, sexual orientation, or gender identity.               Review of your medicines      START taking        Dose / Directions    melatonin 3 MG tablet   Used for:  Failure to thrive (0-17)        Dose:  3 mg   Take 1 tablet (3 mg) by mouth At Bedtime   Quantity:  30 tablet   Refills:  0       * OLANZapine zydis 5 MG ODT tab   Commonly known as:  zyPREXA   Used for:  Comfort measures only status        Dose:  5-10 mg   Take 1-2 tablets (5-10 mg) by mouth 2 times daily as needed for agitation or other (Delirium; 1st line therapy)   Refills:  0       * OLANZapine zydis 5 MG ODT tab   Commonly known as:  zyPREXA   Used for:  Comfort measures only status        Dose:  5 mg   Take 1 tablet (5 mg) by mouth At Bedtime   Refills:  0       * Notice:  This list has 2 medication(s) that are the same as other medications prescribed for you. Read the directions carefully, and ask your doctor or other care provider to review them with you.       CONTINUE these medicines which may have CHANGED, or have new prescriptions. If we are uncertain of the size of tablets/capsules you have at home, strength may be listed as something that might have changed.        Dose / Directions    albuterol 108 (90 BASE) MCG/ACT Inhaler   Commonly known as:  PROAIR HFA/PROVENTIL HFA/VENTOLIN HFA   This may have changed:    - when to take this  - reasons to take this   Used for:  Comfort measures only status        Dose:  2 puff   Inhale 2 puffs into the lungs 4 times daily as needed for other (dyspnea)   Refills:  0       ondansetron 4 MG ODT tab   Commonly known as:  ZOFRAN-ODT   This may have changed:    - when to take this  - reasons to take this   Used for:  Comfort measures only status        Dose:  4 mg   Take 1 tablet (4 mg) by mouth every 6 hours as needed for nausea or vomiting   Quantity:  120 tablet   Refills:  0         STOP taking     ALPRAZOLAM PO           ASPIRIN PO           OXYCODONE HCL PO           polyethylene glycol powder   Commonly known as:  MIRALAX/GLYCOLAX           rosuvastatin 40 MG tablet   Commonly known as:  CRESTOR           scopolamine 72 hr patch   Commonly known as:  TRANSDERM           senna-docusate 8.6-50 MG per tablet   Commonly known as:  SENOKOT-S;PERICOLACE                Where to get your medicines      Some of these will need a paper prescription and others can be bought over the counter. Ask your nurse if you have questions.     You don't need a prescription for these medications     albuterol 108 (90 BASE) MCG/ACT Inhaler    melatonin 3 MG tablet    OLANZapine zydis 5 MG ODT tab    OLANZapine zydis 5 MG ODT tab    ondansetron 4 MG ODT tab                Protect others around you: Learn how to safely use, store and throw away your medicines at www.disposemymeds.org.             Medication List: This is a list of all your medications and when to take them. Check marks below indicate your daily home schedule. Keep this list as a  reference.      Medications           Morning Afternoon Evening Bedtime As Needed    albuterol 108 (90 BASE) MCG/ACT Inhaler   Commonly known as:  PROAIR HFA/PROVENTIL HFA/VENTOLIN HFA   Inhale 2 puffs into the lungs 4 times daily as needed for other (dyspnea)   Last time this was given:  2 puffs on 11/20/2017  7:51 PM                                melatonin 3 MG tablet   Take 1 tablet (3 mg) by mouth At Bedtime   Last time this was given:  3 mg on 11/26/2017  9:10 PM                                * OLANZapine zydis 5 MG ODT tab   Commonly known as:  zyPREXA   Take 1-2 tablets (5-10 mg) by mouth 2 times daily as needed for agitation or other (Delirium; 1st line therapy)   Last time this was given:  5 mg on 11/27/2017 11:24 AM                                * OLANZapine zydis 5 MG ODT tab   Commonly known as:  zyPREXA   Take 1 tablet (5 mg) by mouth At Bedtime   Last time this was given:  5 mg on 11/27/2017 11:24 AM                                ondansetron 4 MG ODT tab   Commonly known as:  ZOFRAN-ODT   Take 1 tablet (4 mg) by mouth every 6 hours as needed for nausea or vomiting                                * Notice:  This list has 2 medication(s) that are the same as other medications prescribed for you. Read the directions carefully, and ask your doctor or other care provider to review them with you.

## 2017-11-17 NOTE — PROGRESS NOTES
No ativan needed for CT scan. Patient demonstrated back discomfort with laying flat and being slid over to the CT table, but otherwise was able to follow instruction and did fine with the scan.

## 2017-11-17 NOTE — CONSULTS
[facility] ID Service: Initial Consultation     Patient:  Sandy Sheffield, Date of birth 11/5/1932, Medical record number [id number]  Date of Visit:  November 17, 2017  Consult Requested by: Dr. 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 She presented on 11/16 with abdominal pain, necrotic great toes, and an SANDRO concerning for graft pathology. CT confirmed it and showed a large fluid collection with air suggestive of vascular graft infection. The infection is likely causing embolic phenomenon to her toes and possibly her kidneys. Overall prognosis of this condition is poor especially since she is a poor candidate for explantation. So, optimal therapy here will be suppressive antibiotics. Currently, we do not have an organism to target (blood cultures are still incubating) so will cover for MRSA and Pseudomonas given that it is an hospital acquired infection. So, recommend vancomycin and piperacillin-tazobactam. Regarding the latter, per EMR she develops nausea/emesis with PCN and patient states that she gets a rash with PCN. Neither of those are overtly serious and we would like to use a PCN antibiotic for this infection to plan for  "long term oral outpatient therapy (would prefer not to use an oral cephalosporin outpatient). So, recommend starting piperacillin-tazobactam and if she develops an intolerance or a rash, switch to cefepime.     Patient seen and discussed with Dr. Marcel Russell, who agrees with the plan above.    Huey Vega  Internal Medicine, PGY-3  [phone]  Attestation:  This patient has been seen and evaluated by me, Js Russell MD.  I discussed this patient with the fellow and/or resident(s) and agree with the findings and plan in this note. I also personally edited this note to reflect my findings. I have reviewed today's vital signs, medications, labs and imaging.   NAT Russell M.D.  [facility] Service Staff  [phone]         History of Present Illness:   Sandy Sheffield is a 86yo female with a hx of COPD, HLD, CKD-IV, T2DM, HTN, CAD, cognitive decline, and recent admission (10/6-10/17) for an EVAR of a 8.7cm AAA (from origin of renal aa to bifurcation) on 10/7 also requiring a left renal snorkel with a post operative course c/b stroke who presented on 11/16 from [facility] with abdominal pain and lower extremity skin changes.   History is obtained from chart as patient is not a good historian. Patient has not been doing well since discharge with abdominal pain and limited mobility along with fatigue and worsening cognition. Also noted changes of darkening color to her toes on both sides. Abdominal pain is sharp, midline, radiating towards back, and intermittent. She was admitted to Medicine for abdominal pain and necrosis of her extremitites with Vascular surgery consulting. VSS were stable on admission and labs showed an SANDRO. A CT was obtained which showed \"large hypoattenuating fluid collection that surrounds the endovascular graft with multiple foci of intraluminal air along with diffuse, mild stranding of the retroperitoneal fat adjacent to the aorta\" which is concerning for a vascular graft infection. So, she was " placed on ceftriaxone and ID was consulted for further management. Renal US showed good flow to b/l aa and aortic US is still pending.          Review of Systems:   Full 12 point ROS obtained, pertinent positives and negatives as above.       Past Medical History:     Past Medical History:   Diagnosis Date     AAA (abdominal aortic aneurysm) (H) 10/07/2017     SANDRO (acute kidney injury) (H)      SANDRO (acute kidney injury) (H)      CAD (coronary artery disease)      COPD (chronic obstructive pulmonary disease) (H)      CVA (cerebral vascular accident) (H) 10/2017     DMII (diabetes mellitus, type 2) (H)      Gastric hemorrhage 02/2013     HLD (hyperlipidemia)      HTN (hypertension)      Past Surgical History:   Procedure Laterality Date     ENDOVASCULAR REPAIR ANEURYSM ABDOMINAL AORTA N/A 10/7/2017    Procedure: ENDOVASCULAR REPAIR ANEURYSM ABDOMINAL AORTA;  -Endovascular Repair Abdominal Aortic Aneurysm  with Endorant with Limb Extention x1.  - Aortic Dissection x1   -Revision of left Brachial  Artery Repair   -Arterial limited duplex study;  Surgeon: Laura Casillas MD;  Location: [facility] OR         Allergies:      Allergies   Allergen Reactions     Ibuprofen GI Disturbance     History of ulcers     Penicillins Nausea and Vomiting     Shrimp Nausea and Vomiting     Tylenol [Acetaminophen] Unknown     Exacerbates her lupus            Current Antimicrobials:   Ceftriaxone         Family History:     Family History   Problem Relation Age of Onset     Hypertension Mother           Social History:     Social History     Social History     Marital status:      Spouse name: N/A     Number of children: N/A     Years of education: N/A     Occupational History     Not on file.     Social History Main Topics     Smoking status: Former Smoker     Types: Cigarettes     Smokeless tobacco: Not on file     Alcohol use No     Drug use: Not on file     Sexual activity: Not on file     Other Topics Concern     Not on file     Social  History Narrative              Physical Exam:   Ranges forvital signs:  Temp:  [97.7  F (36.5  C)-99.2  F (37.3  C)] 98.2  F (36.8  C)  Pulse:  [85-94] 88  Resp:  [18-40] 28  BP: (104-124)/(59-63) 114/60  SpO2:  [95 %-98 %] 96 %    Intake/Output Summary (Last 24 hours) at 11/17/17 0852  Last data filed at 11/17/17 0659   Gross per 24 hour   Intake              800 ml   Output              150 ml   Net              650 ml       Exam:  GENERAL:  Sleeping    ENT:  Head is normocephalic, atraumatic. Oropharynx is moist without exudates or ulcers.  EYES:  Eyes have anicteric sclerae.    NECK:  Supple.  LUNGS:  Distant breath sounds with mild wheezing, on nasal cannula   CARDIOVASCULAR:  Regular rate and rhythm with no murmurs, gallops or rubs.  ABDOMEN:  Normal bowel sounds, soft, nontender. Umbilical hernia present. Pain to deep palpation present  EXT: Extremities warm and without edema.  SKIN:  1cm circumferential eschar's present on b/l great toe's with surrounding erythema   NEUROLOGIC:  Grossly nonfocal.         Laboratory Data:   Reviewed.  Pertinent for:    Creatinine   Date Value Ref Range Status   11/17/2017 4.06 (H) 0.52 - 1.04 mg/dL Final   11/16/2017 4.27 (H) 0.52 - 1.04 mg/dL Final   10/13/2017 2.09 (H) 0.52 - 1.04 mg/dL Final   10/12/2017 2.21 (H) 0.52 - 1.04 mg/dL Final   10/11/2017 2.38 (H) 0.52 - 1.04 mg/dL Final     WBC   Date Value Ref Range Status   11/16/2017 11.3 (H) 4.0 - 11.0 10e9/L Final   10/13/2017 9.5 4.0 - 11.0 10e9/L Final   10/12/2017 9.8 4.0 - 11.0 10e9/L Final   10/11/2017 13.3 (H) 4.0 - 11.0 10e9/L Final   10/10/2017 16.3 (H) 4.0 - 11.0 10e9/L Final     Hemoglobin   Date Value Ref Range Status   11/16/2017 9.7 (L) 11.7 - 15.7 g/dL Final     Platelet Count   Date Value Ref Range Status   11/16/2017 218 150 - 450 10e9/L Final     Lab Results   Component Value Date     11/17/2017    BUN 52 (H) 11/17/2017    CO2 23 11/17/2017       Culture data: Reviewed in EPIC          Imaging:    Reviewed in EPIC

## 2017-11-17 NOTE — PROGRESS NOTES
"SPIRITUAL HEALTH SERVICES  SPIRITUAL ASSESSMENT Progress Note  [facility] ([place]) 7C    Visited with pt per request on admission.  Sandy was joined by \"my baby son.\"  He described her as \"having trouble talking and thinking.\"      When asked, at her son's prompting, she stated that her only need was to be offered communion while she is here.  Reviewed the schedule of eucharistic ministers.  Her son said he anticipates she will be hospitalized for some time.    Will visit this pt 1-2x / wk while she remains on the unit.  Visits are available on request.    Heron Ramirez M.Div.  Staff   Pager [phone]    "

## 2017-11-17 NOTE — PLAN OF CARE
Problem: Patient Care Overview  Goal: Plan of Care/Patient Progress Review  Outcome: Therapy, unable to show any progress toward functional goals  VSS, A&O but disoriented to time and situation. /hr. Pt on VPM. O2 is 93-94% on 3L NC, 4L while sleeping. Pt denies nausea. Intermittently states has pain, but unable to say where. Pt grimaces with palpation to mid-stomach, abdominal ultrasound this morning and waiting on results. Pt unable to effectively communicate needs d/t confusion. PRN oxycodone given 1x, and daughter requested that we give it when it is due.  Pt bedfast, 2 assist to turn/reposition q2h. Bilateral necrotic big toes. Pedal pulses dopplered q6h. Pt was NPO but was switched to regular diet during shift. C/o dry mouth, has only had ice chips and apple juice. BG stable - 112 and then 80s. Pt incontinent, had one accident and soaked through sheets. Continue to monitor with plan of care.

## 2017-11-17 NOTE — PHARMACY-VANCOMYCIN DOSING SERVICE
Pharmacy Vancomycin Initial Note  Date of Service 2017  Patient's  1932  85 year old, female  Actual Body Weight: 81.6 kg     Indication: possible AAA graft infection    Current estimated CrCl < 15 mL/min    Creatinine for last 3 days  2017:  5:14 PM Creatinine 4.27 mg/dL  2017: 12:12 AM Creatinine 4.06 mg/dL;  8:24 AM Creatinine 3.89 mg/dL    Recent Vancomycin Level(s) for last 3 days  No results found for requested labs within last 72 hours.      Vancomycin IV Administrations (past 72 hours)      No vancomycin orders with administrations in past 72 hours.              Nephrotoxins and other renal medications (Future)    Start     Dose/Rate Route Frequency Ordered Stop    17 1500  piperacillin-tazobactam (ZOSYN) 2.25 g in 10 mL NS Premix Syringe      2.25 g  over 3 Minutes Intravenous EVERY 8 HOURS SCHEDULED 17 1458          Contrast Orders - past 72 hours (72h ago through future)    Start     Dose/Rate Route Frequency Ordered Stop    17  iohexol (OMNIPAQUE) solution 50 mL      50 mL Oral ONCE 17              Plan:  1.  Start vancomycin  750 mg IV once, then intermittent dosing based on levels.   2.  Goal Trough Level: 15-20 mg/L or per team.  3.  Pharmacy will check trough levels as appropriate in 1-3 Days.    4. Serum creatinine levels will be ordered daily for the first week of therapy and at least twice weekly for subsequent weeks.      Viviane Reed PharmD  [phone] (text capable)

## 2017-11-17 NOTE — PLAN OF CARE
Problem: Patient Care Overview  Goal: Plan of Care/Patient Progress Review  Outcome: No Change  Admitted today from Assisted Living facility with failure to thrive and bilateral necrotic great toes. A&Ox2, VSS on 2 LPM NC. PIV infusing bolus of NS @ 333/hr. K+ needs replacing, MD to reschedule to start at 0000. UA/UC sent, pain is controled with PO oxycodone. NPO w/ice chips, struggles to swallow pills at times, which is a change in the last 48 hrs. Voided only 150 via straight cath. Has not passed gas, no BM this shift. CT schedule with contrast, CMS intact, ishaan PP Q6H. Plan is for Gold 2 as primary and vascular to follow for recent AAA repair and necrotic great toes.     Addendum: blanchable redness on buttock, pea sized open sore found prior to straight cath, barrier cream applied. WOCN placed.

## 2017-11-17 NOTE — PROGRESS NOTES
[facility], [place]    Internal Medicine Progress Note - Gold Service      Assessment & Plan   Sandy Sheffield is a 85 year old female admitted on 11/16/2017. She has a history of HTN, DM type II, CKD, CAD, COPD, lupus, and AAA s/p endovascular repair (10/7/2017) c/b CVA and is admitted for suspected infection of her aortic graft and SANDRO on CKD.    Suspected Aortic Graft Infection. Incidentally found 9 cm descending AAA on imaging 10/6/2017; subsequently underwent endovascular repair with concurrent left renal stent placement on 10/7 via Dr. Casillas. Discharged to TCU for continued PT/OT assistance, although failed to progress and transitioned to assisted living. Presented 11/16 with increased abdominal pain, weight loss/fatigue, and necrotic lesions on toes. Obtained CT abd/pelvis which revealed large fluid collection surrounding endovascular graft with multiple foci of intraluminal air concerning for infected graft vs thrombus; no evidence of hemorrhage to suggest aortic leak. Renal US revealed patent blood flow to both kidneys. Discussed with vascular surgery, plan to manage infected graft conservatively for now with IV antibiotics as patient not a good candidate for graft explantation. Started on IV ceftriaxone on 11/17, although ID recommending broad coverage with IV vancomycin and zosyn (patient with ?allergy to penicillins - documented as N/V; will monitor for adverse effects). WBC down-trending today at 10.2 (11.3),  (130), LA WNL, BCx NGTD. Patient remains afebrile, intermittent abdominal pain but VSS.  - D/c ceftriaxone, start vancomycin and zosyn (if any signs of adverse effects to zosyn, can d/c and start cefepime)  - Low-dose oxycodone PRN for pain management (tylenol exacerbates pt's lupus, ibuprofen CI with SANDRO)   - Aorta US with dopplers completed - read pending  - Echo ordered for tomorrow to evaluate for valvular vegetation  - Vascular surgery following, appreciate  assistance  - Trend CBC, CMP, CRP, LA in AM  - Follow BCx   - Will hold off on palliative consult for now pending further evaluation    Necrotic Toes Likely 2/2 Thromboembolism. Noted progressive discoloration of bilateral great toes over the past week per patient's daughter. Concern for thromboembolic event in setting of recent endovascular repair. Both toes with dark discoloration/eschar of tips extending to mid-phalanx. Sensation intact in distal extremities, + pedal pulses.  - Doppler US q6h to ensure patent blood flow  - Vascular surgery following per above    SANDRO on CKD. Creatinine elevated at 4.27 on admission, now down-trending at 3.89 with IVF hydration. BL Cr of ~2.2-2.4 although limited data. Had left renal stent placed on 10/7 due to aneurysm per above. Renal US on admission with patent blood flow to both kidneys. Likely SANDRO multifactorial in setting of hypovolemia, UTI. Patient with minimal urine production currently, had 150 cc's out via straight cath overnight.  - Abx and IVF per above  - I&O, daily weights  - Renally dose medications  - Avoid nephrotoxic agents as able  - Trend BMP  - Consider consulting Nephrology if failure to improve  - Patient's daughter notes they would likely not pursue dialysis if patient's renal function were to continue declining    Dirty UA. UA on admission with large LE, 90 WBC, few bacteria, mucous and small blood. Urine culture pending. Notable elevated creatinine per above, patient also with minimal UOP. Started on IV ceftriaxone on 11/17 for infected aortic graft, broadened to vanc/zosyn per ID recc's.  - IV vancomycin and zosyn  - Follow UCx    Recent CVA. MRI brain on 10/11 following AAA repair revealed abnormal diffusion restriction in left > right occipital lobes and bilateral inferior cerebellar hemispheres consistent with acute infarction. Felt possibly related to endovascular procedure, although unable to exclude cardiac etiology. Started on ASA and high-dose  statin for stroke prophylaxis. Patient with some residual cognitive deficits.   - Continue ASA 81 mg QD  - Fall precautions  - Holding off on PT/OT consults per daughter's request - patient has been refusing therapies relatively consistently post-operatively    Normocytic Anemia. Hgb 8.7 (9.7), BL ~10-11 although limited data. No acute s/s of bleeding on exam. Likely anemia in setting of recent surgery, poor nutrition.  - Trend CBC    Lupus. Does not appear to be managed on any medications PTA. Inflammatory markers elevated per above, although likely in setting of infected aortic graft.  - Avoid tylenol as exacerbates patient's lupus    DM Type II. HgbA1C 6.7% this admission. Not maintained on medications PTA. BG initially elevated at 180's, now stable in 110's this AM.  - BG checks q4h with minimal PO intake  - Medium SSI  - Hypoglycemia protocol    COPD. No PFT's on file. Maintained on albuterol PRN PTA. CXR on admission with new small right pleural effusion with right basilar atelectasis or consolidation as well as findings suggesting lung emphysema. Lungs CTAB on exam today.  - Continue albuterol PRN    CAD. Echo 10/2017 with normal LVF/RVF, EF of ~55-60% although technically difficult study. Maintained on aspirin and statin PTA.   - Continue ASA 81 mg QD    Diet: Regular Diet Adult  Fluids: IV NS @ 100 cc/hour pending improved PO intake   DVT Prophylaxis: Pneumatic Compression Devices  Code Status: DNR/DNI    Disposition Plan   Expected discharge: 3-5 days, recommended to transitional care unit once antibiotic plan established, safe disposition plan/ TCU bed available, and goals of care discussed.     Entered: Sakshi Hernandez 11/17/2017, 7:36 AM   Information in the above section will display in the discharge planner report.      The patient's care was discussed with the Attending Physician, Dr. Carlson.    Sakshi Hernandez  Internal Medicine Staff Hospitalist Service  [facility]  "Health  Pager: [phone]  Please see sticky note for cross cover information    Interval History   Patient appears tired this morning. Reports intermittent abdominal pain, although denies this currently. No fevers or chills overnight. Appears to be taking somewhat shallow breaths, but denies SOB or coughing. Does not feel constipated. Notes no pain in her legs or feet. Asked about current location, patient states she knows where she is and then requested to stop talking as it was \"painful\".    Had lengthy discussion with patient's daughter Mary in regards to treatment plan and overall prognosis. Mary voiced understanding that patient is not a surgical candidate at this time and antibiotics will likely not provide ultimate treatment for Sandy. Wanting to hold off on palliative consult at this time pending further work-up. Notes they would likely not pursue dialysis if patient's renal function were to worsen.    Data reviewed today: I reviewed all medications, new labs and imaging results over the last 24 hours. I personally reviewed no images or EKG's today.    Physical Exam   Vital Signs: Temp: 98.2  F (36.8  C) Temp src: Oral BP: 114/60 Pulse: 88   Resp: 28 SpO2: 98 % O2 Device: Nasal cannula Oxygen Delivery: 3 LPM  Weight: 0 lbs 0 oz  General Appearance: Chronically ill-appearing elderly  female resting in bed, NAD.  Respiratory: Respiratory effort somewhat labored on 3 L via NC. Lungs CTAB without rales, rhonchi, or wheezing.  Cardiovascular: RRR, S1/S2. No murmurs, rubs, or gallops.  GI: Abdomen soft, non-distended. Epigastric tenderness. Bowel sounds normoactive. Reducible lacy-umbilical hernia.  Skin: Necrotic lesions on bilateral great toes. Otherwise no jaundice, rashes, or lesions evident on exposed skin.  Extremities: No peripheral edema.  Neurologic: Alert, oriented to person. Unable to identify location or year. Strength 5/5 throughout upper extremities. Passive movement of all extremities " while laying in bed.       Data   Data     Recent Labs  Lab 11/17/17  0824 11/17/17  0012 11/16/17  1756 11/16/17  1714   WBC 10.2  --   --  11.3*   HGB 8.7*  --   --  9.7*   MCV 87  --   --  86     --   --  218   INR  --   --  1.20*  --     133  --  132*   POTASSIUM 3.5 3.0*  --  3.1*   CHLORIDE 106 101  --  100   CO2 19* 23  --  24   BUN 49* 52*  --  57*   CR 3.89* 4.06*  --  4.27*   ANIONGAP 11 9  --  9   OKSANA 8.2* 8.2*  --  8.6   * 141*  --  184*   ALBUMIN  --   --   --  2.4*   PROTTOTAL  --   --   --  7.0   BILITOTAL  --   --   --  0.4   ALKPHOS  --   --   --  45   ALT  --   --   --  19   AST  --   --   --  24   LIPASE  --   --   --  635*

## 2017-11-17 NOTE — PROGRESS NOTES
D: [facility] here to see gluteal cleft open area.     History per MD notes: Sandy Sheffield is a 85 year old female  admitted on 11/16/2017. She has a PMH of COPD, Lupus, HLD, CKD, HTN, DMII, CAD, and recent admission to the SICU (10/6-10/17/17) for AAA repair c/b postoperative CVA. Patient admitted from Assisted living with family reports of one week hx of  worsening abdominal pain, fatigue, and bilateral LE pain and skin changes. Patient admitted to Medicine for for further evaluation and Vascular Surgery consult.      A: blanchable erythema over coccyx and gluteal cleft.  Open area 0.5 x 0.5 x 0.1 cm 2/2 moisture associated skin damage and intertrigo.      I/P: Gluteal cleft: Cleanse the area with Monique cleanse and protect, very gently with soft cloth. Apply critic aid paste. With repeat application, do not scrub the paste, only remove soiled paste and reapply. If complete removal of paste is necessary use baby oil/mineral oil and soft wash cloth.    Face to face time: 15 minutes.    No further follow up planned.  Please re-consult if needed.

## 2017-11-17 NOTE — CONSULTS
VASCULAR SURGERY CONSULTATION  11/16/2017       Date of Admission:  11/16/2017  Reason for Consultation: We were asked by Dr. Guevara of medicine to evaluate Sandy Sheffield for necrosis at tip of bilateral great toes and intermittent abdominal pain in the setting of recent endovascular AAA repair. The patient was seen and evaluated.  Attending: Dr. Jace Sagastume    HPI: Sandy Sheffield is a 85 year old female with significant history of  COPD, lupus, CKD, CAD, HTN, DM II, and 9cm AAA s/p endovascular repair, L renal artery stenting on 10/7/17 who presented to medicine for failure to thrive from an assisted living home. Her postoperative course was complicated by long SICU stay (11 days) and a small cerebellar stroke that has affected her balance, vision, and nausea. She was discharged Her daughter, Mary, thinks that her intermittent confusion has been worsening over the past few days. She has also lost close to 20 pounds since surgery with little appetite, nausea has improved. 3 or so weeks ago, the daughter noted a black spot on the patient's R great toe and then also on the left. These spots have slowly gotten larger. The patient has also had intermittent abdominal pain that is usually upper midline and radiates to her R side. The daughter is not aware of any blood bowel movements. She has been apparently tolerating a diet at the AL facility but has had trouble swallowing pills. The patient is not oriented and not a good historian. This history was obtained with chart review and family assistance.     ROS:   The Review of Systems is negative other than noted in the HPI.    Past Medical History:  COPD  CKD  Lupus  CAD  HTN  DM II  Gastric perforation  AAA    Past Surgical History:   Past Surgical History:   Procedure Laterality Date     ENDOVASCULAR REPAIR ANEURYSM ABDOMINAL AORTA N/A 10/7/2017    Procedure: ENDOVASCULAR REPAIR ANEURYSM ABDOMINAL AORTA;  -Endovascular Repair Abdominal Aortic Aneurysm  with Endorant with  "Limb Extention x1.  - Aortic Dissection x1   -Revision of left Brachial  Artery Repair   -Arterial limited duplex study;  Surgeon: Laura Casillas MD;  Location: [facility] OR   Exploratory laparotomy for gastric perf    Medications:     No current facility-administered medications on file prior to encounter.   Current Outpatient Prescriptions on File Prior to Encounter:  melatonin 3 MG tablet Take 1 tablet (3 mg) by mouth nightly as needed for sleep   scopolamine (TRANSDERM) 72 hr patch Place 1 patch onto the skin every 72 hours   rosuvastatin (CRESTOR) 40 MG tablet Take 1 tablet (40 mg) by mouth daily   lidocaine (LIDODERM) 5 % Patch Place 2 patches onto the skin every 24 hours   ALPRAZolam (XANAX) 0.5 MG tablet Take 1 tablet (0.5 mg) by mouth nightly as needed for anxiety (Anxiety)   albuterol (PROVENTIL HFA) 108 (90 BASE) MCG/ACT Inhaler Inhale 2 puffs into the lungs every 6 hours as needed    ASPIRIN PO Take 81 mg by mouth daily   Omega-3 Fatty Acids (FISH OIL BURP-LESS PO) Take 1 capsule by mouth daily   VITAMIN D, CHOLECALCIFEROL, PO Take 2,000 Units by mouth daily      Allergies:   Allergies   Allergen Reactions     Penicillins Nausea and Vomiting     Shrimp Nausea and Vomiting     Social History:   Social History     Social History     Marital status:      Spouse name: N/A     Number of children: N/A     Years of education: N/A     Occupational History     Not on file.     Social History Main Topics     Smoking status: Not on file     Smokeless tobacco: Not on file     Alcohol use Not on file     Drug use: Not on file     Sexual activity: Not on file     Other Topics Concern     Not on file     Social History Narrative     Family History:   No family history on file.    Exam:  /59 (BP Location: Right arm)  Pulse 94  Temp 99.2  F (37.3  C) (Oral)  Resp 18  Ht 1.727 m (5' 8\")  SpO2 95%  General: Follows commands but oriented only to self, knows she's in a hospital but cannot name which one.  CV: Reg " rate, irregularly irregular rhythm  Pulm: Non labored breathing  Abd: soft, obese, diffuse mild tenderness. Nonperitoneal.    Ext: Wiggles toes bilaterally, erythema in great toes with associated necrotic area distal great toes, other toes normal. +Doppler signals in PT/DP bilaterally.         Labs: Reviewed in full.  132 100 57 / 184  3.1 24 4.27 \    11.3 > 9.7 < 218    Lactate pending  INR 1.2    Alk 45  ALT 19  AST 24  tBili 0.4    Imaging:   CT A/P with oral contrast pending  Abd US with dopplers pending    Assessment: Sandy Sheffield is a 85 year old female with multiple medical co-morbidities and 9cm AAA s/p endovascular AAA repair with Dr. Casillas on 10/7/17 c/b post-operative CVA is admitted to medicine with failure to thrive, intermittent abdominal pain of unclear etiology, weight loss, and AMS. Also with necrotic areas of b/l great toes likely embolic (blue toe syndrome).     Recommendations:  - IVF resuscitation.  - Continue work up of abdominal pain. CT A/P non-contrast ordered to evaluate AAA size compared to prior to repair to help r/o possible endoleak.   - Can consider abdominal US with dopplers to assess flow to mesenteric vessels and kidneys (h/o renal artery stenosis).   - Serial labs and abdominal exams including lactate.   - Add on lipase to r/o pancreatitis. LFTs normal but may pain may be r/t biliary etiology.  - Local cares to feet, monitor for infection.  - Issues with swallowing pills at assisted living --> speech path consult.   - Nutrition consult.  - Of note, the patient's family and surrogate decision maker (Mary) have made the patient DNR/DNI. They are unsure if they would want additional procedures/surgery at this time if needed.     The patient was discussed with Dr. Sagastume, vascular surgery staff, who agrees with the plan.    Flavio Hernandez MD  General Surgery PGY-3  Pager [phone]

## 2017-11-17 NOTE — PLAN OF CARE
Problem: Patient Care Overview  Goal: Plan of Care/Patient Progress Review  A&Ox2, disoriented to time and situation. On VPM, d/t picking at IV and NC lines. O2 is 93-94% on 4L NC, AOVSS. Pt denies nausea. Pt unable to effectively communicate needs d/t confusion. Pt c/o back pain, PRN oxycodone given 1x.  Pt bedfast, 2 assist to turn/reposition. Reposition pt q2h. Pt has necrosis in 2 toes, other toes reddened, pedal pulses dopplered q6h. Pt NPO, refusing swabs of water. Pt unable to void this shift, bladder scan was 90ml. Continue to monitor.

## 2017-11-17 NOTE — PROGRESS NOTES
Vascular surgery staff    Long discussion with daughter.  Imaging results reviewed with her, regarding high liklihood of aortic graft infection, possible septic emboli to the toes  + UTI as well and mild leukocytosis  Would get blood cultures and also echo to evaluate the aortic valve as another potential source for infection.  Overall, her long-term prognosis is not great, as her creatinine is now greater than 4 and the family is likely not going to proceed with dialysis if it is needed in the future.  Virginia Wise MD

## 2017-11-18 PROBLEM — K59.00 CONSTIPATION: Status: ACTIVE | Noted: 2017-01-01

## 2017-11-18 NOTE — PROGRESS NOTES
11/18/17 0844   General Information   Onset Date 11/18/17   Start of Care Date 11/16/17   Referring Physician Alanis Guevara PA-C   Patient Profile Review/OT: Additional Occupational Profile Info See Profile for full history and prior level of function   Patient/Family Goals Statement Son would like to know if swallowing is safe, very concerned about changes in breathing pattern following swallow.    Swallowing Evaluation Bedside swallow evaluation   Behaviorial Observations Combative/agitated;Confused;Distractible;Impulsive;Uncooperative   Mode of current nutrition NPO   Respiratory Status O2 Supply   Type of O2 supply High flow face mask   Comments Orders received for swallow evaluation due to shortness of breath after drinking water last night. Pt is known to SLP service from prior hospitalization on 10/15/17, where regular diet and thin liquids were recommended. Pt presents today with hx of COPD, HLD, CKD-IV, T2DM, HTN, CAD, cognitive decline, and recent admission (10/6-10/17) for an EVAR of a 8.7cm AAA (from origin of renal aa to bifurcation) on 10/7 also requiring a left renal snorkel with a post operative course c/b stroke who presented on 11/16 from [facility] with abdominal pain and lower extremity skin changes. Nurse reports significant disorientation, confustion, intermittent difficulty following directions, and that pt is unable to express needs due to these deficits.   Clinical Swallow Evaluation   Oral Musculature unable to assess due to poor participation/comprehension   Dentition (Per evaluation on 10/15/17, pt has no lower dentition.)   Oral Labial Strength and Mobility (Symmetrical smile. Unable to further assess.)   Lingual Strength and Mobility (Intact protrusion, unable to further assess lingual function)   Laryngeal Function (Not able to cough or throat clear on command.)   Oral Musculature Comments Oral mechanism exam completed indirectly as pt refused to follow commands while clinician was  watching, but would do so later when SLP speaking to family. Cough heard during examination to be strong, but pt unable to cough or throat clear on command during oral mechanism examination, despite effort.    Additional Documentation Yes   Additional evaluation(s) completed today Recommended   Rationale for completing additional evaluation Recommend OT evaluation for cognitive function.    Clinical Swallow Eval: Thin Liquid Texture Trial   Mode of Presentation, Thin Liquids spoon;straw;fed by clinician   Volume of Liquid or Food Presented ice chips x5, teaspoonfuls x4, clinician limited sip from straw x2   Oral Phase of Swallow Premature pharyngeal entry   Oral Residue left anterior lateral sulci  (pt tilted head to L despite max clinician cues)   Pharyngeal Phase of Swallow impaired;coughing/choking;no awareness of problems;throat clearing;wet vocal quality after swallow;other (see comments)  (change in respiratory rate after spoon and straw swallows)   Diagnostic Statement Trials of ice chips with no overt s/s of aspiration. Trials via spoon with change in respiratory rate and difficulty coordinating breathing with swallow, one instance of coughing. Straw trials: 1 with strong coughing, 1 with burp afterward, 1 with cough and significant change in O2 level.   Clinical Swallow Eval: Nectar Thick Liquid Texture Trial   Mode of Presentation, Nectar spoon;straw;fed by clinician   Volume of Nectar Presented teaspoonful x2, straw x2   Oral Residue, [place] (Pt reported anterior spillage after swallow. No spillage obs)   Pharyngeal Phase, Nectar impaired;coughing/choking;other (see comments)  (shallow, labored breathing after each trial)   Diagnostic Statement Pt reports oral residue from spoon trials. Labored and shallow breathing after controlled-quantity straw trials, one trial with burp then cough.   Clinical Swallow Eval: Puree Solid Texture Trial   Mode of Presentation, Puree spoon   Volume of Puree Presented  teaspoonful   Diagnostic Statement Patient spit out puree immediately and reported she would not trial any more.    Swallow Compensations   Swallow Compensations Reduce amounts   Results Suspect aspiration   Esophageal Phase of Swallow   Patient reports or presents with symptoms of esophageal dysphagia No   General Therapy Interventions   Planned Therapy Interventions Dysphagia Treatment   Dysphagia treatment Modified diet education;Instruction of safe swallow strategies;Compensatory strategies for swallowing   Intervention Comments SLP to f/u for tolerance of ice chips and advanced texture trials.   Swallow Eval: Clinical Impressions   Skilled Criteria for Therapy Intervention Skilled criteria met.  Treatment indicated.   Functional Assessment Scale (FAS) 2   Treatment Diagnosis Moderate-severe oropharyngeal dysphagia   Diet texture recommendations NPO  (Single ice chips okay)   Recommended Feeding/Eating Techniques no straws   Demonstrates Need for Referral to Another Service occupational therapy   Therapy Frequency daily   Predicted Duration of Therapy Intervention (days/wks) 2 weeks   Anticipated Discharge Disposition inpatient rehabilitation facility   Risks and Benefits of Treatment have been explained. Yes   Patient, family and/or staff in agreement with Plan of Care Yes  (family and staff in agreement)   Clinical Impression Comments Patient presents with moderate-severe oropharyngeal dysphagia in the setting of impaired coordination of breathing and swallow, decreased oral awareness, impaired self-monitoring. Pt impulsivity and cognitive function also place patient at increased risk of aspiration. SLP unable to trial puree, soft-solids, or solids due to pt declination. Trials of thin and nectar via spoon and straw positive for s/s of aspiration. Trials of ice chips with no overt s/s of aspiration. Recommend continue NPO, no pills, single ice chips WITH 1:1 assist and verbal cue to swallow okay. Perform  frequent oral cares with moist toothete. Pt to be fully upright and alert for all PO intake. Hold ice chips at change in vitals, cough, throat clear, or change in voice following swallow. Recommend OT evaluation for cognition. SLP to follow for advanced texture trials and swallow safety.    Total Evaluation Time   Total Evaluation Time (Minutes) 35

## 2017-11-18 NOTE — PROGRESS NOTES
Brief Medicine Note    Contacted by RN regarding reports of patient with increasing sob after drinking water. RR in the 30's other VS stable. Will obtain CXR, place NPO status, and SLP eval for dysphagia/aspiration. Patient with previous CXR 11/16 with small Right pleural effusion- consider increasing pleural effusion as well. Stable 2L O2 use with O2 sats 96%. Afebrile.   - Continue to monitor  - NPO tonight  - SLP eval in am   - No antibiotic changes as patient currently on Vanc/Zosyn    Alanis Guevara PA-C  Internal Medicine Hospitalist Service  [facility]  Pager: [phone]

## 2017-11-18 NOTE — PROVIDER NOTIFICATION
Notified MD at 0400 AM regarding pain.      Spoke with: Jace Guadalupe MD  [phone]    Orders were obtained.    Comments: Pt awaked with c/o SOB, generalized pain/discomfort.  Pt confused, not sure exactly what she wants.  MD paged because no pain meds are available as PRN.  MD ordered Dilaudid 0.25mg 1x dose.  Given - pt able to sleep well.  Cont to monitor.

## 2017-11-18 NOTE — PLAN OF CARE
Problem: Patient Care Overview  Goal: Plan of Care/Patient Progress Review  Outcome: No Change  Tmax 99.5 ax.  BP and HR WNL.  RR 20-30, c/o SOB at times - neb Tx x1 per RT.  97% 2-3L FM overnight.  LS diminished.    Irregular HR at 00 (50s-90s) - EKG ordered - MD aware, ordered magnesium lab draw this morning.  Cont to monitor.    Pt confused, disoriented to time, place, situation.  VPM and Bed Alarm ON for safety.    Pt unable to communicate needs at times, due to confusion and cognitive status. Son at bedside overnight.  Dilaudid IV and ativan given with relief - pt able to rest between cares. BS Q4hrs, covered per sl scale.    Coccyx reddened, small open area - barrier cream applied (per WOC note).  Turn Q2hrs overnight, pt non-compliant with turning at times.  MIV@100/hr.  ATBX as ordered.  Strict NPO!  Swallow Eval per SLP this morning.  0400 + doppler Q6hr, andrea feet.  Andrea big toes necrotic, MIRIAM.  Incontinent at times, bedpan offered but pt confused about it - briefs ON.    Cont with POC.

## 2017-11-18 NOTE — PROGRESS NOTES
[facility], [place]    Internal Medicine Progress Note - Gold Service      Assessment & Plan   Sandy Sheffield is a 85 year old female admitted on 11/16/2017. She has a history of HTN, DM type II, CKD, CAD, COPD, lupus, and AAA s/p endovascular repair (10/7/2017) c/b CVA and is admitted for suspected infection of her aortic graft and SANDRO on CKD.    Suspected Aortic Graft Infection. Incidentally found 9 cm descending AAA on imaging 10/6/2017; subsequently underwent endovascular repair with concurrent left renal stent placement on 10/7 via Dr. Casillas. Discharged to TCU for continued PT/OT assistance, although failed to progress and transitioned to assisted living. Presented 11/16 with increased abdominal pain, weight loss/fatigue, and necrotic lesions on toes. Obtained CT abd/pelvis which revealed large fluid collection surrounding endovascular graft with multiple foci of intraluminal air concerning for infected graft vs thrombus; no evidence of hemorrhage to suggest aortic leak. Renal US revealed patent blood flow to both kidneys; aorta US with majority of graft obscured by bowel gas but patent proximal graft. Discussed with vascular surgery, plan to manage infected graft conservatively for now with IV antibiotics as patient not a good candidate for graft explantation. Started on IV ceftriaxone on 11/17, although switched to vancomycin/zosyn per ID recc's. WBC down-trending today at 8.7 (10.2),  (120), LA WNL, BCx NGTD. Patient remains afebrile, intermittent abdominal pain but VSS.  - Continue IV vancomycin and zosyn  - Low-dose IV dilaudid for pain management as patient strict NPO  - Echo ordered for today  - Vascular surgery following, appreciate assistance  - Trend CBC, BMP, CRP in AM  - Follow BCx   - Will hold off on palliative consult for now pending further evaluation; had lengthy discussion with daughter Mary on 11/17 regarding guarded prognosis for patient, will revisit on a  daily basis.    Necrotic Toes Likely 2/2 Thromboembolism. Noted progressive discoloration of bilateral great toes over the past week per patient's daughter. Concern for thromboembolic event in setting of recent endovascular repair. Both toes with dark discoloration/eschar of tips. Sensation intact in distal extremities, + pedal pulses.  - Doppler US q6h to ensure patent blood flow  - Vascular surgery following per above    SANDRO on CKD. Creatinine elevated at 4.27 on admission, now down-trending at 3.82 with IVF hydration. BL Cr of ~2.2-2.4 although limited data. Had left renal stent placed on 10/7 due to aneurysm per above. Renal US on admission with patent blood flow to both kidneys. Likely SANDRO multifactorial in setting of hypovolemia, UTI. Had minimal UOP on admission, now with several episodes of urinary incontinence.  - Abx and IVF per above  - I&O, daily weights  - Renally dose medications  - Avoid nephrotoxic agents as able  - Trend BMP  - Patient's daughter notes they would likely not pursue dialysis if patient's renal function were to continue declining    Dirty UA. UA on admission with large LE, 90 WBC, few bacteria, mucous and small blood. Urine culture with no growth which is surprising. Notable elevated creatinine per above, patient also with minimal UOP. Started on IV ceftriaxone on 11/17 for infected aortic graft, broadened to vanc/zosyn per ID recc's.  - IV vancomycin and zosyn per above - should cover any potential source of UTI    Dysphagia with ?Acute Aspiration Event, Recent CVA. MRI brain on 10/11 following AAA repair revealed abnormal diffusion restriction in left > right occipital lobes and bilateral inferior cerebellar hemispheres consistent with acute infarction. Felt possibly related to endovascular procedure, although unable to exclude cardiac etiology. Started on ASA and high-dose statin for stroke prophylaxis. Patient with some residual cognitive deficits. Concern for possible aspiration  event overnight 11/17 as patient with increased RR and acute SOB. CXR revealed increased moderate right pleural effusion with overlying atelectasis or infection. Patient afebrile, WBC WNL. SLP evaluated, recommend continuing NPO status as patient with moderate/severe dysphagia.  - Continue ASA 81 mg QD  - Fall precautions  - Continue NPO, may give single ice chips with assistance and close monitoring  - SLP following  - Holding off on PT/OT consults per daughter's request - patient has been refusing therapies relatively consistently post-operatively    Normocytic Anemia. Hgb 8.2 (8.7), BL ~10-11 although limited data. No acute s/s of bleeding on exam. Likely anemia in setting of recent surgery, poor nutrition.  - Trend CBC    Lupus. Does not appear to be managed on any medications PTA. Inflammatory markers elevated per above, although likely in setting of infected aortic graft.  - Avoid tylenol as exacerbates patient's lupus    DM Type II. HgbA1C 6.7% this admission. Not maintained on medications PTA. BG initially elevated at 180's, now stable in 110's this AM.  - BG checks q4h with minimal PO intake  - Medium SSI  - Hypoglycemia protocol    COPD. No PFT's on file. Maintained on albuterol PRN PTA. CXR on admission with new small right pleural effusion with right basilar atelectasis or consolidation as well as findings suggesting lung emphysema. Lungs CTAB on exam today.  - Continue albuterol PRN    CAD. Echo 10/2017 with normal LVF/RVF, EF of ~55-60% although technically difficult study. Maintained on aspirin and statin PTA.   - Continue ASA 81 mg QD    Diet: Snacks/Supplements Adult: Between Meals  NPO for Medical/Clinical Reasons Except for: No Exceptions  Fluids: IV LR @ 100 cc/hour  DVT Prophylaxis: Pneumatic Compression Devices  Code Status: DNR/DNI    Disposition Plan   Expected discharge: 3-5 days, recommended to prior living arrangement once antibiotic plan established, safe disposition plan, and goals of  care discussed.     Entered: Sakshi Hernandez 11/18/2017, 7:39 AM   Information in the above section will display in the discharge planner report.      The patient's care was discussed with the Attending Physician, Dr. Carlson.    Sakshi Hernandez  Internal Medicine Staff Hospitalist Service  [facility]  Pager: [phone]  Please see sticky note for cross cover information    Interval History   Sandy is more tangential this morning. States she has pain all over. Son reports patient had a rough night of sleep with intermittent bouts of abdominal pain and concern for possible aspiration event. Concerned about patient's declining mental status and requesting switching ativan to haldol for acute agitation. Remains afebrile. Several episodes of urinary incontinence overnight.    Data reviewed today: I reviewed all medications, new labs and imaging results over the last 24 hours. I personally reviewed no images or EKG's today.    Physical Exam   Vital Signs: Temp: 97.9  F (36.6  C) Temp src: Axillary BP: 117/59 Pulse: 97   Resp: 18 SpO2: 91 % O2 Device: None (Room air) Oxygen Delivery: 2 LPM  Weight: 0 lbs 0 oz  General Appearance: Chronically ill-appearing elderly  female resting in bed, NAD.  Respiratory: Respiratory effort somewhat labored on 3 L via NC. Lungs CTAB without rales, rhonchi, or wheezing.  Cardiovascular: RRR, S1/S2. No murmurs, rubs, or gallops.  GI: Abdomen soft, non-distended, non-tender. Bowel sounds normoactive. Reducible lacy-umbilical hernia.  Skin: Necrotic lesions on bilateral great toes. Otherwise no jaundice, rashes, or lesions evident on exposed skin.  Extremities: No peripheral edema.  Neurologic: Alert but confused. Speaking in tangential statements. Difficulty answering direct questions. Passive movement of all extremities while laying in bed.     Data   Data     Recent Labs  Lab 11/18/17  0803 11/17/17  0824 11/17/17  0012 11/16/17  6356  11/16/17  1714   WBC 8.7 10.2  --   --  11.3*   HGB 8.2* 8.7*  --   --  9.7*   MCV 93 87  --   --  86    199  --   --  218   INR  --   --   --  1.20*  --     136 133  --  132*   POTASSIUM 3.4 3.5 3.0*  --  3.1*   CHLORIDE 111* 106 101  --  100   CO2 14* 19* 23  --  24   BUN 45* 49* 52*  --  57*   CR 3.82* 3.89* 4.06*  --  4.27*   ANIONGAP 11 11 9  --  9   OKSANA 7.8* 8.2* 8.2*  --  8.6   * 107* 141*  --  184*   ALBUMIN 1.8*  --   --   --  2.4*   PROTTOTAL 5.8*  --   --   --  7.0   BILITOTAL 0.2  --   --   --  0.4   ALKPHOS 38*  --   --   --  45   ALT 18  --   --   --  19   AST 27  --   --   --  24   LIPASE  --   --   --   --  635*

## 2017-11-18 NOTE — PLAN OF CARE
Problem: Patient Care Overview  Goal: Plan of Care/Patient Progress Review  Outcome: No Change  Disoriented to time, situation. VPM in place and children at bedside. VSS on 3L NC; pt c/o SOB after drinking ensure, increased RR. No coughing after drinking/eating noted. MD notified; switched to NPO, chest X-ray obtained. Speech eval in AM. Intermittently c/o pain, unable to quantify where; daughter requested pt receive pain meds when available. Bedfast, repositioning q2hrs. Incontinence cares. Pt unable to communicate needs due to confusion and cognitive status. Son at bedside overnight. Continue to monitor.

## 2017-11-18 NOTE — PROVIDER NOTIFICATION
Notified MD at 12:20 AM regarding irregular pulse.     Spoke with: Jace Guadalupe MD #[id number]      Orders were obtained.    Comments: 12-lead EKG ordered.

## 2017-11-18 NOTE — PROGRESS NOTES
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  - Follow-up blood culture results    DISCUSSION:   Ms. Sheffield is an 84yo woman with a hx of T2DM, COPD, and 8.7cm AAA s/p EVAR on 10/7 with a post-op course c/b stroke. She presented on  with abdominal pain, necrotic great toes, and an SANDRO concerning for graft pathology. CT showed a large fluid collection with air suggestive of a vascular graft infection. The infection is likely causing embolic phenomenon to her toes and possibly her kidneys. Overall prognosis of this condition is poor especially since she is a poor candidate for explantation. Optimal therapy here will be suppressive antibiotics. Currently, we do not have an organism to target (blood cultures are still incubating) so will cover for MRSA and Pseudomonas given that this is a hospital acquired infection with Vancomycin and Piperacillin-Tazobactam. Regarding the latter, per EMR she develops nausea/emesis with PCN and patient states that she gets a rash with PCN. Neither of those are overtly serious and we would like to use a PCN antibiotic for this infection to plan for long term oral outpatient therapy (would prefer not to use an oral cephalosporin outpatient). Recommend continuing Piperacillin-Tazobactam and if she develops an intolerance or a rash,  "may switch to Cefepime.    Above discussed with Infectious Diseases Staff, Dr. Jeannette Garay.  ID will continue to follow.      Jazmin Moya D.O.  [facility] ID Fellow  [phone]       CHIEF INFECTIOUS DISEASES COMPLAINT:  Aortic Endovascular Graft Infection    INTERVAL HISTORY:   Concern for aspiration overnight. Has ongoing abdominal pain. Family requesting patient no longer be given Ativan. Remains afebrile.     ROS: A five-point review of systems was obtained and was negative with the exception of that which is described above.  Allergies   Allergen Reactions     Ibuprofen GI Disturbance     History of ulcers     Penicillins Nausea and Vomiting     Shrimp Nausea and Vomiting     Tylenol [Acetaminophen] Unknown     Exacerbates her lupus     Allergies were reviewed.    CURRENT ANTI-INFECTIVES:   Vancomycin  Ceftriaxone     EXAMINATION:   /49 (BP Location: Left arm)  Pulse 89  Temp 98.1  F (36.7  C) (Axillary)  Resp 20  Ht 1.727 m (5' 8\")  SpO2 99%  GENERAL:  Elderly white woman, laying in bed in no acute distress.   EYES:  Eyes have anicteric sclerae without conjunctival injection.  LUNGS:  Normal respiratory effort. Has face mask oxygen on. Lung fields are clear to auscultation bilateral.  CARDIOVASCULAR:  Regular rate and rhythm with no murmurs, gallops or rubs.  ABDOMEN:  Normal bowel sounds, soft, nontender. Reducible umbilical hernia.   SKIN:  Dry gangrene of the distal aspect of bilateral great toes.  NEUROLOGIC:  Grossly nonfocal. Active x4 extremities.  PSYCH: Appropriate affect. Alert and oriented to person, place and time. Somewhat confused in conversation.  CURRENT LINES: PIV    NEW DATA/RESULTS:   All interval basic labs, microbiology results and imaging were reviewed.    Culture Micro   Date Value Ref Range Status   11/16/2017 No growth after 2 days  Preliminary   11/16/2017 No growth  Final   11/16/2017 No growth after 2 days  Preliminary       Recent Labs   Lab Test  " 11/18/17   0803  11/17/17   0824  11/16/17   1714   CRP  110.0*  120.0*  130.0*     Recent Labs   Lab Test  11/18/17   0803  11/17/17   0824  11/16/17   1714  10/13/17   0733  10/12/17   0738  10/11/17   0806   WBC  8.7  10.2  11.3*  9.5  9.8  13.3*     Recent Labs   Lab Test  11/18/17   0803  11/17/17   0824  11/17/17   0012  11/16/17   1714   CR  3.82*  3.89*  4.06*  4.27*   GFRESTIMATED  11*  11*  10*  10*       Hematology Studies  Recent Labs   Lab Test  11/18/17   0803  11/17/17   0824  11/16/17   1714  10/16/17   0800  10/13/17   0733  10/12/17   0738  10/11/17   0806   WBC  8.7  10.2  11.3*   --   9.5  9.8  13.3*   HCT  28.3*  28.2*  31.0*   --   26.3*  26.7*  29.8*   PLT  174  199  218  246  183  182  159       Metabolic  Recent Labs   Lab Test  11/18/17   0803  11/17/17   0824  11/17/17   0012   NA  136  136  133   BUN  45*  49*  52*   CO2  14*  19*  23   CR  3.82*  3.89*  4.06*   GFRESTIMATED  11*  11*  10*       Hepatic Studies  Recent Labs   Lab Test  11/18/17   0803  11/16/17   1714   BILITOTAL  0.2  0.4   ALKPHOS  38*  45   ALBUMIN  1.8*  2.4*   AST  27  24   ALT  18  19       Immunologlobulins  Recent Labs   Lab Test  11/17/17   0824   SED  92*

## 2017-11-18 NOTE — PLAN OF CARE
Problem: Patient Care Overview  Goal: Plan of Care/Patient Progress Review  Discharge Planner SLP   Patient plan for discharge: Not stated  Current status: Patient presents with moderate-severe oropharyngeal dysphagia in the setting of impaired coordination of breathing and swallow, decreased oral awareness, impaired self-monitoring. Pt impulsivity and cognitive function also place patient at increased risk of aspiration. SLP unable to trial puree, soft-solids, or solids due to pt declination. Trials of thin and nectar via spoon and straw positive for s/s of aspiration. Trials of ice chips with no overt s/s of aspiration. Recommend continue NPO, no pills, single ice chips WITH 1:1 assist and verbal cue to swallow okay. Perform frequent oral cares with moist toothete. Pt to be fully upright and alert for all PO intake. Hold ice chips at change in vitals, cough, throat clear, or change in voice following swallow. Recommend OT evaluation for cognition. SLP to follow for advanced texture trials and swallow safety.   Barriers to return to prior living situation: Medical stability, cognitive function, altered diet  Recommendations for discharge: TCU  Rationale for recommendations: Pt will benefit from continued SLP services for swallowing at next level of care.        Entered by: Abbi Treviño 11/18/2017 9:14 AM

## 2017-11-18 NOTE — PROGRESS NOTES
"Dyspnea overnight  Mild r sided abd pain  /59 (BP Location: Right arm)  Pulse 97  Temp 97.9  F (36.6  C) (Axillary)  Resp 18  Ht 1.727 m (5' 8\")  SpO2 91%  Mildly dyspnea  Awake and alert  Soft abd  Warm feet pulses pulses - blue toes    85f with infected evar - not a candidate for explant  Continue abx   May benefot from palliative care consult  Discussed with dr. kerline Doshi    "

## 2017-11-19 NOTE — PHARMACY-VANCOMYCIN DOSING SERVICE
Pharmacy Vancomycin Note  Date of Service 2017  Patient's  1932   85 year old, female    Indication: possible AAA graft infection  Goal Trough Level: 15-20 mg/L  Day of Therapy: 3  Current Vancomycin regimen: 750mg x1    Current estimated CrCl = CrCl cannot be calculated (Unknown ideal weight.).    Creatinine for last 3 days  2017:  5:14 PM Creatinine 4.27 mg/dL  2017: 12:12 AM Creatinine 4.06 mg/dL;  8:24 AM Creatinine 3.89 mg/dL  2017:  8:03 AM Creatinine 3.82 mg/dL  2017:  6:29 AM Creatinine 3.87 mg/dL    Recent Vancomycin Levels (past 3 days)  2017:  6:29 AM Vancomycin Level 6.1 mg/L    Vancomycin IV Administrations (past 72 hours)                   vancomycin (VANCOCIN) 750 mg in NaCl 0.9 % 250 mL intermittent infusion (mg) 750 mg New Bag 17 1653                Nephrotoxins and other renal medications (Future)    Start     Dose/Rate Route Frequency Ordered Stop    17 0800  vancomycin (VANCOCIN) 1,250 mg in NaCl 0.9 % 250 mL intermittent infusion      1,250 mg  over 90 Minutes Intravenous ONCE 17 0747      17 1530  vancomycin place suarez - receiving intermittent dosing      1 each Does not apply SEE ADMIN INSTRUCTIONS 17 1531      17 1500  piperacillin-tazobactam (ZOSYN) 2.25 g in 10 mL NS Premix Syringe      2.25 g  over 3 Minutes Intravenous EVERY 8 HOURS SCHEDULED 17 1458               Contrast Orders - past 72 hours (72h ago through future)    Start     Dose/Rate Route Frequency Ordered Stop    17  iohexol (OMNIPAQUE) solution 50 mL      50 mL Oral ONCE 17 19517          Interpretation of levels and current regimen:  Trough level is  Subtherapeutic    Has serum creatinine changed > 50% in last 72 hours: No    Urine output:  unable to determine    Renal Function: Stable    Plan:  1.  1250mg x1  2.  Pharmacy will check trough levels as appropriate in 1-3 Days.    3. Serum creatinine  levels will be ordered daily for the first week of therapy and at least twice weekly for subsequent weeks.      Yenifer Mandel        .

## 2017-11-19 NOTE — PLAN OF CARE
"Problem: Patient Care Overview  Goal: Plan of Care/Patient Progress Review  Outcome: No Change  /70 (BP Location: Left arm)  Pulse 91  Temp 98  F (36.7  C) (Axillary)  Resp 18  Ht 1.727 m (5' 8\")  SpO2 95%    O2 via facemask when patient is sleeping. When pt is alert she is sating around 94% on RA. Pt disorientated, confused, and agitated continuously today. Pain moderately managed with PRN dilaudid. Agitation somewhat managed with haldol. Midline placed today, blood return noted. SLP saw pt today and recommended that pt only have a couple ice chips with supervision otherwise NPO. /107/86, no ss insulin given. Watch overnight for hypoglycemia symptoms. Repro q2-3h with 3 assist. Incontinent of urine x3, no BM. Pt becomes increasingly more agitated when moved in bed. Coccyx with a small skin tear, followed [facility] nurse recommendations. Family at bedside all day, supportive.     Scopolamine patch noted behind pt L ear. Was placed on 11/16 at the nursing home per daughter. Needs to be replaced tomorrow, orders placed.   "

## 2017-11-19 NOTE — PLAN OF CARE
Problem: Patient Care Overview  Goal: Plan of Care/Patient Progress Review  Discharge Planner SLP   Patient plan for discharge: Not stated.  Current status: Pt showing signs of aspiration with thin liquids and with ice chips. No overt s/sx of aspiration with honey thick liquids and puree texture. Recommend dysphagia 1 diet and honey thick liquids, pills crushed in puree, NO CUPS OR STRAWS, feed from spoon only. Nursing staff must provide 1:1 assistance for all PO and monitor closely for cough after swallow or any changes in voice. Hold PO if staff not in room. Hold PO at any s/sx of aspiration. Pt to be fully upright and alert for all PO intake, alternate puree and honey thick liquids, all from spoon. Consider alternate source of nutrition and hydration. Consider OT evaluation for cognition. SLP to follow daily per POC.   Barriers to return to prior living situation: Medical stability, dysphagia, cognitive status  Recommendations for discharge: Pt will require daily SLP services for dysphagia at next level of care  Rationale for recommendations: Pt not receiving adequate nutrition or hydration from oral diet, pt at high risk of aspiration due to impulsivity and cognitive status, pt not at baseline diet.       Entered by: Abbi Treviño 11/19/2017 1:40 PM

## 2017-11-19 NOTE — PLAN OF CARE
Problem: Patient Care Overview  Goal: Plan of Care/Patient Progress Review  Outcome: Therapy, progress toward functional goals is gradual  VSS on 3L with facemask. Pain managed with PRN dilaudid. Pt confused, disorientated to all but self and agitated; haldol given q6hrs with little change in agitation. Bed bound and repositioned q2hrs. BG checks q4hrs no insulin given. Incontinent of urine and has not had BM in a few days. NPO with few ice chips. Midline infusing MIVF and abx. Son at bedside. Continue with POC.

## 2017-11-19 NOTE — PROGRESS NOTES
[facility], [place]    Internal Medicine Progress Note - Gold Service      Assessment & Plan   Sandy Sheffield is a 85 year old female admitted on 11/16/2017. She has a history of HTN, DM type II, CKD, CAD, COPD, lupus, and AAA s/p endovascular repair (10/7/2017) c/b CVA and is admitted for suspected infection of her aortic graft and SANDRO on CKD.    Suspected Aortic Graft Infection. Incidentally found 9 cm descending AAA on imaging 10/6/2017; subsequently underwent endovascular repair with concurrent left renal stent placement on 10/7 via Dr. Casillas. Discharged to TCU for continued PT/OT assistance, although failed to progress and transitioned to assisted living. Presented 11/16 with increased abdominal pain, weight loss/fatigue, and necrotic lesions on toes. Obtained CT abd/pelvis which revealed large fluid collection surrounding endovascular graft with multiple foci of intraluminal air concerning for infected graft vs thrombus; no evidence of hemorrhage to suggest aortic leak. Renal US revealed patent blood flow to both kidneys; aorta US with majority of graft obscured by bowel gas but patent proximal graft. Discussed with vascular surgery, plan to manage infected graft conservatively for now with IV antibiotics as patient not a good candidate for graft explantation. Started on IV ceftriaxone on 11/17, although switched to vancomycin/zosyn per ID recc's. WBC stable at 9.3 (8.7),  (110), LA WNL, BCx NGTD. Patient remains afebrile, intermittent abdominal pain but VSS.  - Continue IV vancomycin and zosyn  - Increase IV dilaudid to 0.5 mg QD for pain management as patient strict NPO  - Echo ordered for today  - Vascular surgery following, appreciate assistance  - Trend CBC, BMP, CRP in AM  - Follow BCx - will wait for finalization, per ID if negative may need to consider further work-up for infection (tagged WBC study, etc)  - Palliative consult placed for tomorrow, discussed with  family    Necrotic Toes Likely 2/2 Thromboembolism. Noted progressive discoloration of bilateral great toes x 1 week PTA. Concern for thromboembolic event in setting of recent endovascular repair. Both toes with dark discoloration/eschar of tips, stable. Sensation intact in distal extremities, + pedal pulses.  - Doppler US q6h to ensure patent blood flow  - Vascular surgery following per above    SANDRO on CKD. Creatinine elevated at 4.27 on admission, initially down-trending and now stable at 3.87 with IVF hydration. BL Cr of ~2.2-2.4 although limited data. Had left renal stent placed on 10/7 due to aneurysm per above. Renal US on admission with patent blood flow to both kidneys. Likely SANDRO multifactorial in setting of hypovolemia, UTI. Had minimal UOP on admission, now with several episodes of urinary incontinence.  - Abx per above  - IV D5 1/2 NS + 20 mEq KCl @ 75 cc/hour  - I&O, daily weights  - Renally dose medications  - Avoid nephrotoxic agents as able  - Trend BMP  - Patient's daughter notes they would likely not pursue dialysis if patient's renal function were to continue declining    Dirty UA. UA on admission with large LE, 90 WBC, few bacteria, mucous and small blood. Urine culture with no growth which is surprising. Notable elevated creatinine per above, patient also with minimal UOP. Started on IV ceftriaxone on 11/17 for infected aortic graft, broadened to vanc/zosyn per ID recc's.  - IV vancomycin and zosyn per above - should cover any potential source of UTI    Dysphagia with ?Acute Aspiration Event, Recent CVA. MRI brain on 10/11 following AAA repair revealed abnormal diffusion restriction in left > right occipital lobes and bilateral inferior cerebellar hemispheres consistent with acute infarction. Felt possibly related to endovascular procedure, although unable to exclude cardiac etiology. Started on ASA and high-dose statin for stroke prophylaxis. Patient with some residual cognitive deficits.  Concern for possible aspiration event overnight 11/17 as patient with increased RR and acute SOB. CXR revealed increased moderate right pleural effusion with overlying atelectasis or infection. Patient afebrile, WBC WNL. SLP evaluated, initially recommended NPO although re-evaluated today and okay with patient hesitantly starting DD1 diet with honey-thickened liquids.  - Hold ASA as patient NPO  - Fall precautions  - May hesitantly advance to DD1 diet with honey-thickened liquids per SLP - to given with a spoon ONLY, no cups or straws  - SLP following  - IV haldol 1 mg q6h PRN for agitation  - Holding off on PT/OT consults per daughter's request - patient has been refusing therapies relatively consistently post-operatively    Normocytic Anemia. Hgb 8.4 (8.2), BL ~10-11 although limited data. No acute s/s of bleeding on exam. Likely anemia in setting of recent surgery, poor nutrition.  - Trend CBC    Hypokalemia (resolved). Presented with potassium down to 3.0, likely in setting of poor PO intake. Received supplementation with subsequent normalization of potassium.  - Replacement protocol  - Trend BMP    Lupus. Does not appear to be managed on any medications PTA. Inflammatory markers elevated per above, although likely in setting of infected aortic graft.  - Avoid tylenol as exacerbates patient's lupus    DM Type II. HgbA1C 6.7% this admission. Not maintained on medications PTA. BG initially elevated at 180's, now well-controlled.  - BG checks q4h as NPO  - Medium SSI  - Hypoglycemia protocol    COPD. No PFT's on file. Maintained on albuterol PRN PTA. CXR on admission with new small right pleural effusion with right basilar atelectasis or consolidation as well as findings suggesting lung emphysema. Lungs CTAB on exam today.  - Continue albuterol PRN    CAD. Echo 10/2017 with normal LVF/RVF, EF of ~55-60% although technically difficult study. Maintained on aspirin and statin PTA.   - Hold ASA while NPO for aspiration  risk    Diet: Dysphagia Diet Level 1 Pureed Honey Thickened Liquids (pre-thickened or use instant food thickener) (Use spoon only - no cup or straw)  Fluids: IV D5 1/2 NS + 20 mEq KCl @ 75 cc/hour  DVT Prophylaxis: Pneumatic Compression Devices  Code Status: DNR/DNI    Disposition Plan   Expected discharge: 3-5 days, recommended to prior living arrangement once antibiotic plan established, safe disposition plan, and goals of care discussed.     Entered: Sakshi Hernandez 11/19/2017, 1:50 PM   Information in the above section will display in the discharge planner report.      The patient's care was discussed with the Attending Physician, Dr. Carlson.    Sakshi Hernandez  Internal Medicine Staff Hospitalist Service  [facility]  Pager: [phone]  Please see sticky note for cross cover information    Interval History   Sandy is doing alright today. Patient's son has not noted any improvement of agitation with haldol. Believes the dilaudid is providing good relief as patient will develop severe abdominal pain with repositioning. Increased coughing and congestion overnight. Patient continues to be confused but recognizes her family members and has periods of increased lucency.     Data reviewed today: I reviewed all medications, new labs and imaging results over the last 24 hours. I personally reviewed no images or EKG's today.    Physical Exam   Vital Signs: Temp: 98.3  F (36.8  C) Temp src: Oral BP: 115/67 Pulse: 95   Resp: 16 SpO2: 98 % O2 Device: None (Room air) Oxygen Delivery: 3 LPM  Weight: 0 lbs 0 oz  General Appearance: Chronically ill-appearing elderly  female resting in bed, NAD.  Respiratory: Respiratory effort normal on 3 L via NC. Lungs CTAB without rales, rhonchi, or wheezing.  Cardiovascular: RRR, S1/S2. No murmurs, rubs, or gallops.  GI: Abdomen soft, non-distended. Epigastric tenderness. Bowel sounds normoactive. Reducible lacy-umbilical hernia.  Skin: Stable  necrotic lesions on bilateral great toes. Otherwise no jaundice, rashes, or lesions evident on exposed skin.  Extremities: No peripheral edema.  Neurologic: Alert, intermittently confused. Recognizes her son. Passive movement of all extremities while laying in bed.     Data   Data     Recent Labs  Lab 11/19/17  0629 11/18/17  0803 11/17/17  0824  11/16/17  1756 11/16/17  1714   WBC 9.3 8.7 10.2  --   --  11.3*   HGB 8.4* 8.2* 8.7*  --   --  9.7*   MCV 87 93 87  --   --  86    174 199  --   --  218   INR  --   --   --   --  1.20*  --     136 136  < >  --  132*   POTASSIUM 3.5 3.4 3.5  < >  --  3.1*   CHLORIDE 109 111* 106  < >  --  100   CO2 17* 14* 19*  < >  --  24   BUN 42* 45* 49*  < >  --  57*   CR 3.87* 3.82* 3.89*  < >  --  4.27*   ANIONGAP 10 11 11  < >  --  9   OKSANA 8.4* 7.8* 8.2*  < >  --  8.6   * 137* 107*  < >  --  184*   ALBUMIN  --  1.8*  --   --   --  2.4*   PROTTOTAL  --  5.8*  --   --   --  7.0   BILITOTAL  --  0.2  --   --   --  0.4   ALKPHOS  --  38*  --   --   --  45   ALT  --  18  --   --   --  19   AST  --  27  --   --   --  24   LIPASE  --   --   --   --   --  635*   < > = values in this interval not displayed.

## 2017-11-19 NOTE — PLAN OF CARE
Problem: Patient Care Overview  Goal: Plan of Care/Patient Progress Review  Outcome: No Change  VSS. Oriented only to self. Agitated a few times, prn haldol given with improvement. C/o's abd pain, prn dilaudid given with some relief. On 3L O2/mask, sats in the 90-100s. Infrequent non-productive cough-MD aware. Pt now on DD1 diet with honey thickened liqs, pt not liking it. Incontinent of urine, pericares done with 2-3 person as pt needed distraction, barrier cream applied to skin tear in gluteal fold. Turn q2hrs. No BM this shift. On VPM/bed alarm for safety. Midline infusing, IV abx given. BGs wnl, no insulin given.  P: Continue to monitor gen status and continue with POC  Addendum: 6:53 PM  Skin tear in pt's gluteal fold seem to have gotten deeper, area cleansed and mepilex applied and surrounding area applied with barrier cream for incontinence, pt has been turned several times. C/o's nausea after few spoons of thickened shake and water, zofran given, scop patch in place as well.

## 2017-11-19 NOTE — PROGRESS NOTES
"Right sided pain a little worse today  Some agitation and confusion  /72 (BP Location: Left arm)  Pulse 84  Temp 98.4  F (36.9  C) (Oral)  Resp 20  Ht 1.727 m (5' 8\")  SpO2 98%  Non-toxic  Bilateral bs on 3L  Soft abd with upper abdominal tenderness  Warm feet stable great toe ulcerslabs and imaging reviewed    Imp  Infected endograft - not explant candidate is the current thinking - continue medical cares with abx - poor prognosis  Obtain echo to evaluate valves    Kelin Doshi        "

## 2017-11-20 NOTE — PROGRESS NOTES
CLINICAL NUTRITION SERVICES - ASSESSMENT NOTE     Nutrition Prescription    RECOMMENDATIONS FOR MDs/PROVIDERS TO ORDER:  -Suspect inadequate PO intake in setting of weight loss history and now with limited diet (DD1 w/honey thickened liquids). Calorie counts ordered-highly likely that pt will need nutrition support pending GOC. Would recommend enteral nutrition.   -Recommend MVI w/minerals to meet micronutrient needs  -Pt at refeeding risk monitor lytes to assess for refeeding (mg++/K+/phos)     Malnutrition Status:    -Non-severe    Recommendations already ordered by Registered Dietitian (RD):  -Ordered magic cup TID between meals. Each provides: 290kcals and 9g protein  -Ordered calorie counts    Future/Additional Recommendations:  -If EN warranted recommend goal: Isosource 1.5 @ goal 55 ml/hr (1320 ml/day) to provide 1980 kcals (27 kcal/kg/day), 90 g PRO (1.2 g/kg/day), 1003 ml free H2O, 232 g CHO and 20 g Fiber daily.     REASON FOR ASSESSMENT  Sandy Sheffield is a/an 85 year old female assessed by the dietitian for Admission Nutrition Risk Screen for unintentional loss of 10# or more in the past two months and reduced oral intake over the last month    NUTRITION HISTORY  Information obtained from patient. Patient reports weight loss but is unclear why she has lost weight. Per patient she eats when she is hungry and doesn't eat when she is not. She notes her UBW ~200lbs.     Per H&P:   PMH: HTN, DM type II, CKD, CAD, COPD, lupus, and AAA s/p endovascular repair (10/7/2017) c/b CVA and is admitted for suspected infection of her aortic graft and SANDRO on CKD. Pt with 1 week worsening abdominal pain, fatigue, decrease mobility.     CURRENT NUTRITION ORDERS  Diet: Dysphagia Level 1:  Pureed w/ honey thickened liquids. Feed w/ spoon only and 1:1 assistance as of 11/19 per SLP recommendations  Intake: per RN note pt not liking diet    LABS  K+ 3.3    MEDICATIONS  IVF D5% @ 75mL/hr this provides: 90g cho  K+ lyte  "replacement    ANTHROPOMETRICS  Height: 172.7 cm (5' 8\")  Most Recent Weight: 72.6kg-per ED note from [facility](11/16/2017)  IBW: 63.6 kg  BMI: Overweight BMI 25-29.9  Weight History:   Wt Readings from Last 15 Encounters:   10/16/17 81.6 kg (180 lb)   9kg (~11%) weight loss in 1 month  Dosing Weight: 73 kg    ASSESSED NUTRITION NEEDS  Estimated Energy Needs: 8061-6219 kcals/day (25 - 30 kcals/kg)  Justification: Maintenance  Estimated Protein Needs:  grams protein/day (1.2 - 1.5 grams of pro/kg)  Justification: Hypercatabolism with critical illness  Estimated Fluid Needs: 1 mL/kcal  Justification: Maintenance    PHYSICAL FINDINGS  WOC nurse consulted for skin tear in gluteal fold-not yet assessed    MALNUTRITION  % Intake: Unable to assess-suspect decrease given weight loss  % Weight Loss: 9kg (~11%) weight loss in 1 month  Subcutaneous Fat Loss: None observed  Muscle Loss: Thoracic region (clavicle):  mild  Fluid Accumulation/Edema: None noted  Malnutrition Diagnosis: Non-severe malnutrition in the context of acute on chronic illness    NUTRITION DIAGNOSIS  Unintentional weight loss related to likely decreased PO intake with abdominal pain and fatigue as evidenced by ~11% weight loss in 1 month    INTERVENTIONS  Implementation  Nutrition Education: Provided education on current diet order and challenges with taking in enough calories/protein.    Collaboration with other providers-paged team to update     Goals  Pt to meet at least 60% of estimated calorie/protein needs vs start nutrition support pending GOC     Monitoring/Evaluation  Progress toward goals will be monitored and evaluated per protocol.    The above assessment was completed by Clau Carvajal RD, LD Unit pager [phone]  "

## 2017-11-20 NOTE — PLAN OF CARE
Problem: Patient Care Overview  Goal: Plan of Care/Patient Progress Review    SLP 7C: Per RN, pt not appropriate for skilled dysphagia tx this AM. Pt lethargic/somnolent following macario cares with RN. Will re-attempt as able this date. Cont per POC

## 2017-11-20 NOTE — CONSULTS
[facility], [place]    Palliative Care Consultation Note    Patient: Sandy Sheffield  Date of Admission:  11/16/2017    Requesting provider/team: Joanna Carlson MD  Reason for consult: Pain management  Symptom management  Goals of care  Decisional support  Patient and family support    Recommendations:  Please see assessment below for rationale.  - DNR/DNI, confirmed with daughter, Mary  - Family feels that they need to know that all reasonable interventions have been attempted  - Would anticipate transition to comfort care if such interventions have been done and continuing aggressive measures would only mean a couple of months   - Informed  that more frequent visits are appreciated, family requested daily  - Ongoing goals of care discussion with patient (if able) and children are appropriate      - Add Lidoderm patches in hopes may require less opioids, worried opioids could be contributing to urinary retention and confusion  -Recommend non-pharmacological interventions including: environment (not having excessive, inadequate or ambiguous sensory input, medication not interrupting sleep, presenting one stimulus or task at a time), orientation (room should have a clock, calendar, and chart of the day s schedule; evaluate need for glasses, hearing aid, ), familiarity (objects from home, same staff, family members staying with patient, discussion of familiar areas of interest), communication (clear, slow, simple, repetitive, facing patient, warm, firm kindness, address patient by name, identify self, encourage verbal expression), activities (avoid physical restraint, allow movement, encourage self care and personal activities)    These recommendations have been discussed with Joanna Carlson MD and Isai Grande PA-C.    Thank you for the opportunity to participate in the care of this patient and family. Our team will follow. Please feel free to contact the on-call  Palliative provider with any urgent needs.     JERROD Alvarado CNS  Palliative Care Consult Team  Pager: [phone]    [facility] Inpatient Team Consult pager [phone] (M-F 8-4:30)  After-hours Answering Service [phone]   Palliative Clinic: [phone]       Assessment:  Sandy Sheffield is a 85 year old female with with a complex PMH including HTN, DMII, CKD, CAD, COPD, Lupus, as well as a AAA s/p endovascular repair (10/7/2017) who was admitted after having some abdominal pain and weight loss with concern of infection of her aortic graft. Patient presented on 17 with one week of increasing progressive fatigue, abdominal pain, bilateral LE changes and pain. On admission she was found to have SANDRO on CKD, positive UA, concern for endovascular leak versus ischemia. She underwent testing and is now suspected of having an infected AAA graft which is complicated due to no growth of organisms on blood cultures. She was determined not to be a good candidate for explantation of the graft. There is also concern for thromboemolic event in the setting of her recent endovascular repair. Also had possible aspiration event , SLP saw and recommended DD1 diet. Family reports that patient has had progressive episodes of cognitive decline during hospitalization. They note that she has been requesting and referencing  family members. Patient has also had a new onset of retention and has been straight cathed x2.    After speaking with daughter, Mary, and her brother, as long as medical team has done every reasonable intervention with intent to treat and improve quality of life, family seems very amendable to comfort care. Main goal is comfort. They are against any invasive or aggressive measures that would more likely prolong life instead of improve quality. We discussed in depth what comfort cares would look like and the options for hospice and that she would likely be taken off broad spectrum abx once  confirmed that comfort care is the direction we are going. Mary has stated that she is interested in having her mother stay at a residential hospice facility vs. a  Nursing home with hospice care.      Symptoms:   Abdominal pain: winces to with palpation, denies pain when asked, currently using dilaudid 0.5 mg IV Q4 PRN  AMS: some agitation, using Haldol 1mg IV Q6 PRN  Nausea/vomiting: started on scopolamine patch after last surgery with zofran PRN, no nausea currently  Urinary Retention: Straight cath    Social:          Living situation: was at assisted living, family is not interested in a nursing home.        Support system: children, Mary- daughter, Félix-son, Nahid- son, Juan Manuel- son       Substance use disorder (past / present): smoker for 65 years    Spiritual/Holiness:    Spiritual background: identifies as Quaker per documentation and confirmed by children  Spiritual needs: Children interested in having a  pray with her once a day    Advance Care Planning:           Decision making capacity: deferred to YOON Hernandez (daughter)       Disease understanding: uncertain understanding from patient, but appropriate understanding from family       Goals of Care: leaning towards comfort care/hospice.        Preferred way of decision making:        Health care directive: none on file       Health care agent:        Code Status:  DNR / DNI       POLST There is no POLST (Physician orders for life-sustaining treatment) form on file for this patient      History of Present Illness   Sources of History:patient and patient's daughter (Mary) and son    Sandy Sheffield is a 85 year old female with with a complex PMH including HTN, DMII, CKD, CAD, COPD, Lupus, as well as a AAA s/p endovascular repair (10/7/2017) who was admitted after having some abdominal pain and weight loss. and bilateral LE changes and pain. Patient was residing in assistant living facility after being discharged from TCU due to resistance to rehab.  Daughter reports getting in contact with vascular team after symptoms progressed before bringing her into the hospital. Family reports that patient has had progressive episodes of cognitive decline during hospitalization. They note that she has been requesting and referencing  family members. Patient has also had a new onset of retention and has been straight cathed x2.     Prior to admission she had a AAA on 10/6/17 and prior had resided at a adult  and had bilateral mobility and memory deficits, she had a AAA repair and renal stent placement 10/7 and her postoperative course was complicated by a stroke and LE clots. She also developed likely a postoperative nausea/vomiting, and delirium and has been taking a scopolamine patch since last admission. Daughter reports of 2 episodes of nausea, one during the ambulance ride and another during her current admission, but denies any vomiting.     She was discharged to TCU on 10/17/17 and was not able to progress with therapy and assisted living was recommended. She was started on a low dose oxycodone for her pain prior to admission. Daughter reports that patient's mobility has been significantly limited due to apparent resistance for rehab.    Palliative care consulted  for patient with likely infected AAA graft, not a surgical candidate, NPO for risk of aspiration, family is uncertain about nutrition support and aggressive measures.    ROS:  Palliative Symptom Review (0=no symptom/no concern, 1=mild, 2=moderate, 3=severe):  Pain: 0  Nausea: 0  Shortness of Breath: 0 (on oxymask)  Delirium: 2  Other: dysphagia (1)  Overall (0 good/no concerns, 3 very poor): 2       Past Medical History:   Past Medical History:   Diagnosis Date     AAA (abdominal aortic aneurysm) (H) 10/07/2017     SANDRO (acute kidney injury) (H)      SANDRO (acute kidney injury) (H)      CAD (coronary artery disease)      COPD (chronic obstructive pulmonary disease) (H)      CVA (cerebral  vascular accident) (H) 10/2017     DMII (diabetes mellitus, type 2) (H)      Gastric hemorrhage 02/2013     HLD (hyperlipidemia)      HTN (hypertension)           Past Surgical History:   Past Surgical History:   Procedure Laterality Date     ENDOVASCULAR REPAIR ANEURYSM ABDOMINAL AORTA N/A 10/7/2017    Procedure: ENDOVASCULAR REPAIR ANEURYSM ABDOMINAL AORTA;  -Endovascular Repair Abdominal Aortic Aneurysm  with Endorant with Limb Extention x1.  - Aortic Dissection x1   -Revision of left Brachial  Artery Repair   -Arterial limited duplex study;  Surgeon: Laura Casillas MD;  Location: [facility] OR             Family History:   Family History   Problem Relation Age of Onset     Hypertension Mother      Family history reviewed       Allergies:   Allergies   Allergen Reactions     Ibuprofen GI Disturbance     History of ulcers     Penicillins Nausea and Vomiting     Shrimp Nausea and Vomiting     Tylenol [Acetaminophen] Unknown     Exacerbates her lupus            Medications:   I have reviewed this patient's medication profile and medications given in the past 24 hours.    Medications of Note  Scopolamine patch (started 1/16?)  Haldol 1 mg q6h PRN- x3  Hydromorphone 0.5 mg q4h PRN- 2 mg over last 24 hours  Ondansetron 4 mg q6h- x1  Senna 2 tablets 2 times per day         Physical Exam:   Vital Signs: Temp: 96  F (35.6  C) Temp src: Axillary BP: 116/54 Pulse: 87   Resp: 18 SpO2: 99 % O2 Device: Simple face mask Oxygen Delivery: 3 LPM  Weight: 0 lbs 0 oz    Physical Exam:  Constitutional: Awake, alert, cooperative, no apparent distress, and appears stated age.  Lungs: No increased work of breathing, good air exchange, clear to auscultation bilaterally, no crackles or wheezing.  Cardiovascular: Regular rate and rhythm, normal S1 and S2, no S3 or S4, and no murmur noted.  Neurologic: Awake, alert, oriented to name, place and time.         Data reviewed:     ROUTINE ICU LABS (Last four results)  CMP  Recent Labs  Lab 11/20/17  0700  11/19/17  0629 11/18/17  0803 11/17/17  0824  11/16/17  1714    137 136 136  < > 132*   POTASSIUM 3.3* 3.5 3.4 3.5  < > 3.1*   CHLORIDE 112* 109 111* 106  < > 100   CO2 21 17* 14* 19*  < > 24   ANIONGAP 8 10 11 11  < > 9   * 131* 137* 107*  < > 184*   BUN 36* 42* 45* 49*  < > 57*   CR 3.75* 3.87* 3.82* 3.89*  < > 4.27*   GFRESTIMATED 11* 11* 11* 11*  < > 10*   GFRESTBLACK 14* 13* 14* 13*  < > 12*   OKSANA 7.9* 8.4* 7.8* 8.2*  < > 8.6   MAG  --   --  2.2  --   --  2.5*   PROTTOTAL  --   --  5.8*  --   --  7.0   ALBUMIN  --   --  1.8*  --   --  2.4*   BILITOTAL  --   --  0.2  --   --  0.4   ALKPHOS  --   --  38*  --   --  45   AST  --   --  27  --   --  24   ALT  --   --  18  --   --  19   < > = values in this interval not displayed.  CBC  Recent Labs  Lab 11/20/17  0700 11/19/17  0629 11/18/17  0803 11/17/17  0824   WBC 7.4 9.3 8.7 10.2   RBC 3.07* 3.16* 3.06* 3.26*   HGB 8.1* 8.4* 8.2* 8.7*   HCT 26.7* 27.6* 28.3* 28.2*   MCV 87 87 93 87   MCH 26.4* 26.6 26.8 26.7   MCHC 30.3* 30.4* 29.0* 30.9*   RDW 15.5* 15.3* 15.8* 15.1*    187 174 199     INR  Recent Labs  Lab 11/16/17  1756   INR 1.20*     Arterial Blood GasNo lab results found in last 7 days.    JERROD Alvarado CNS  Palliative Care Consult Team  Pager: [phone]    [facility] Inpatient Team Consult pager [phone] (M-F 8-4:30)  After-hours Answering Service [phone]  Palliative Clinic: [phone]     Total time spent was 80 minutes,  >50% of time was spent counseling and/or coordination of care regarding goals of care with family.

## 2017-11-20 NOTE — PLAN OF CARE
Problem: Patient Care Overview  Goal: Plan of Care/Patient Progress Review  Outcome: Therapy, unable to show any progress toward functional goals  Pt is DNR/DNI. Pt alert but not oriented except to self this morning.  Able to identify children and speaks clearly and logically at times but then can lapse easily into illogical verbalizations.  Pt turned q 2 hours and placed in side-lying positions.  WOCN here and assessed coccyx area, with pressure injury identified.  New orders noted.  Pt bladder scanned at 0945 for 793.  Had not voided since 0230.  Attempted to have her use the bedpan but she was unable to void.  No incontinence noted.  Pt straight cathed for 500.  Very difficult to do.  Required 4 people to help position her and pt became quite agitated during the process.  Attempted to speak softly to her and provide reassurance and explanation, but difficult for pt to understand. Pt's family here with her all shift.  Pt given IV Dilaudid x 1 after turning her at 1300, when she again became very agitated and seemd to find the movement painful.  Pt taking only a few spoonfuls of food at breakfast and lunch.  Tolerating thickened liquids fed to her.  Speech therapy here to assess further and recommendations noted.  No BM noted today.  Bilateral great toes reddened and with small blackend areas.  + DP and PT pulses bilaterally.  Continue to monitor.  Continue to VPM monitoring.

## 2017-11-20 NOTE — PLAN OF CARE
Problem: Patient Care Overview  Goal: Plan of Care/Patient Progress Review  Outcome: No Change  VSS. Pain managed with PRN dilaudid q4hrs. Confused and only orientated to self. VPM for safety and reorientation. Pt agitated and haldol given x1 with some improvement. Pt kept c/o of 'needing to use the bathroom' and trying to get out of bed to do so. Bladder scanned for 900cc's then with calming measures promoted pt was able to go a small amount but bladder scanned again for 500cc's. MD notified and straight cath order placed for any bladder scan amount greater than 350cc. Pt was straight cathed for ~750cc. Coccyx mepilex intact and pt repositioned q2hrs. Son supportive at bedside. Continue with POC.

## 2017-11-20 NOTE — PROGRESS NOTES
"Vascular Surgery Progress Note     Patient seen and evaluated at the bedside.  Complains of dry mouth. No chest pain or abdominal pain.     /54 (BP Location: Left arm)  Pulse 87  Temp 96  F (35.6  C) (Axillary)  Resp 18  Ht 5' 8\"  SpO2 99%  Afebrile overnight.     Intake/Output Summary (Last 24 hours) at 11/20/17 0751  Last data filed at 11/20/17 0700   Gross per 24 hour   Intake             1280 ml   Output              750 ml   Net              530 ml     General: Elderly  female resting supine in bed.   Cor: RRR  Pulm: Breathing is unlabored. Face mask in intact with O2 at 3L  Abd: Reducible incisional abdominal hernia. S/NT/ND  LE: Warm and well perfused. Focal darkened areas of necrosis overlying the right and left great toes.     Vanc level 15.6  WBC 7.4 (9.3)  Cr: 3.75  Blood Cultures: Negative to Date    Assessment: Mrs. Sheffield is an 84 yo female with history of HTN, HLD, COPD, Posterior circulation CVA, CAD and AAA s/p CHEVAR 10/07 admitted with positive UA and CT evidence concerning for graft infection.  At the present time her symptoms are more likely a result of UTI with normal postoperative changes in the sac.     Plan:     1. ? Infected Endo graft- Patient currently stable at this junction. No leukocytosis but markedly elevated CRP. May benefit from confirmation study. PET-CT vs. Tagged leukocyte to demonstrate graft infection in 4-6 weeks after IV antibiotics.  Follow cultures.  At the present time CT non-con is not overtly impressive for graft infection.    2. Repeat Pro Calcitonin today  3. Nutrition: Patient is presently malnourished with albumin of 1.8.  She requires supplementation to bolster her immune response. Continue diet with thickened liquids.  Would recommend Nutrition Consult.  4. Urinary retention- Straight Cath PRN. May consider repeat of UA.     Dhaval Carcamo MD   Vascular Surgery Fellow  Pager: [phone]          "

## 2017-11-20 NOTE — PLAN OF CARE
Problem: Patient Care Overview  Goal: Plan of Care/Patient Progress Review  Outcome: No Change  Assumed care at 1900. AVSS on 3L. Patient oriented to self only. VPM on and son helpful with reorientation. Dilaudid given x1 for abdominal pain. On a dysphagia diet level 1 with honey thickened liquids. Sliding scale insulin not needed. IV fluids running via midline IV. Incontinent of urine. Turn q2h with assist of 2-3. Skin tear noted on gluteal folds. Continue with plan of care.

## 2017-11-20 NOTE — SIGNIFICANT EVENT
SPIRITUAL HEALTH SERVICES Significant Event  [employer] Sacrament of ANOINTING  [facility] ([place] 7C    Pt anointed by Father Bartolo Kearney, [facility]  , per request of patient.  Pt's son and daughter were in the room during this visit and joined me in prayer at pt's bedside.   Pt is a little bit confused, but likes to talk. Pt wished to receive the Holy Communion, but couldn't because of food restriction. Pt had eleven children, two of them . She had been homebound for a while, so she doesn't attend a regular parish.  Spiritual support and encouragement offered as well. Pt and her children expressed gratitude.    Father Bartolo Golden

## 2017-11-20 NOTE — PLAN OF CARE
Problem: Patient Care Overview  Goal: Plan of Care/Patient Progress Review  Discharge Planner SLP   Patient plan for discharge: not stated  Current status: No overt s/sx of aspiration with honey-thick liquids via spoon or cup. Pt benefits from cues for small single sips via cup. No overt s/sx of aspiration w/ teaspoon nectar-thick liquids; however, pt only accepted x1 trial prior to refusing additional PO. Recommend continue dysphagia diet level 1 textures and honey-thick liquids by cup or spoon w/ 1:1 supervision. Pt must be upright/alert, alternate solids/liquids, and use slow pace. Recommend consider alternative source of nutrition/hydration if PO intake remains inadequate. Recommend consider OT/PT evaluations to assess cognition and functional mobility. SLP will continue to follow daily.   Barriers to return to prior living situation: Medical stability, dysphagia, cognition  Recommendations for discharge: TCU vs. Home health with ongoing SLP services   Rationale for recommendations: Below baseline diet, suspected cognitive deficits        Entered by: Kaya Kim 11/20/2017 2:52 PM

## 2017-11-20 NOTE — PROGRESS NOTES
[facility] Nurse Inpatient Adult Pressure InjuryAssessment    Initial Assessment  Reason for consultation: Evaluate and treat Sacrum    Assessment:   Sacrum wound due to Pressure Injury and Moisture Associated Skin Damage (MASD)      Pressure Injury is Stage 2 Present on admission No  Contributing factor of the pressure injury: pressure, immobility and moisture  Status: initial assessment, Stable  Symptomatic    Photo: Not taken, pt getting ready for lunch    TREATMENT  PLAN      Sacrum wound: Cleanse the area with NS and pat dry.    Apply No sting film barrier to periwound skin.    Cover wound with Mepilex.     Change dressing Q 3 days.    Turn and reposition Q 2hrs.    Ensure pt has Raheem-cushion while sitting up in the chair.  FYI- If pt has constant incontinent loose stools needing dressing changes Q shift please discontinue the Mepilex dressing and apply criticaid barrier paste BID and PRN.  Orders Written  WO Nurse follow-up plan:weekly  Nursing to notify the Provider(s) and re-consult the WO Nurse if wound(s) deteriorates or new skin concern.    Patient History  According to provider note(s):  Sandy Sheffield is a 85 year old female admitted on 11/16/2017. She has a history of HTN, DM type II, CKD, CAD, COPD, lupus, and AAA s/p endovascular repair (10/7/2017) c/b CVA and is admitted for suspected infection of her aortic graft and SANDRO on CKD.    Objective Data   Containment of urine/stool: She was incontinent of B&B but now has issue with retention.    Current Diet/ Nutrition:    Active Diet Order      Dysphagia Diet Level 1 Pureed Honey Thickened Liquids (pre-thickened or use instant food thickener) (Use spoon only - no cup or straw)    Output:   I/O last 3 completed shifts:  In: 1260 [P.O.:60; I.V.:1200]  Out: 750 [Urine:750]    Skin Assessment: Josh Josh Score  Avg: 15.3  Min: 13  Max: 22                                Josh Q No Data Recorded                     NSRAS No Data Recorded               Labs:    Recent Labs  Lab 11/20/17  0700  11/17/17  0824 11/16/17  1756   HGB 8.1*  < > 8.7*  --    INR  --   --   --  1.20*   WBC 7.4  < > 10.2  --    A1C  --   --  6.7*  --    CRP 84.0*  < > 120.0*  --    < > = values in this interval not displayed.        Recent Labs  Lab 11/16/17  2224 11/16/17  2120 11/16/17  1952   CULT No growth after 4 days No growth No growth after 4 days       Physical Exam    Skin assessment:   Focused skin inspection: Sacrum/coccyx    Wound Location:  Sacrum  Wound History: Pt needs assist of 2 for repositioning, was incontinent of B&B until Friday now has issue with retention.  Measurements (length x width x depth, in cm) Mid sacrum-0.5 cm x 0.2 cm  x  0.1 cm, R) sacrum-1.5x1.1x0.1cm  Wound Base:  100% dermal tissue pale white/ pink  Tunneling N/A  Undermining N/A  Palpation of the wound bed: normal firmly over bone  Periwound skin: intact  Color: normal and consistent with surrounding tissue  Temperature: normal   Drainage:, none  Description of drainage:   Odor: none  Pain: painful upon palpation    Interventions  Current support surface: Standard  Atmos Air    Current off-loading measures: Foam padding and Pillows under calves  Visual inspection of wound(s) completed  Wound Care:was done per plan of care    Cleansing with NS solution  Supplies: Reviewed  Education provided today: importance of turning and repositioning    Discussed plan of care with Patient and Nurse    Face to face time: 20 minutes

## 2017-11-20 NOTE — PHARMACY-VANCOMYCIN DOSING SERVICE
Pharmacy Vancomycin Note  Date of Service 2017  Patient's  1932   85 year old, female    Indication: Possible AAA graft infection  Goal Trough Level: 15-20 mg/L  Day of Therapy: 4  Current Vancomycin regimen:  1250 mg IV once on     Current estimated CrCl < 15    Creatinine for last 3 days  2017:  8:03 AM Creatinine 3.82 mg/dL  2017:  6:29 AM Creatinine 3.87 mg/dL  2017:  7:00 AM Creatinine 3.75 mg/dL    Recent Vancomycin Levels (past 3 days)  2017:  6:29 AM Vancomycin Level 6.1 mg/L  2017:  7:00 AM Vancomycin Level 15.6 mg/L    Vancomycin IV Administrations (past 72 hours)                   vancomycin (VANCOCIN) 1,250 mg in NaCl 0.9 % 250 mL intermittent infusion (mg) 1,250 mg New Bag 17 1032                Nephrotoxins and other renal medications (Future)    Start     Dose/Rate Route Frequency Ordered Stop    17 1530  vancomycin place suarez - receiving intermittent dosing      1 each Does not apply SEE ADMIN INSTRUCTIONS 17 1531      17 1500  piperacillin-tazobactam (ZOSYN) 2.25 g in 10 mL NS Premix Syringe      2.25 g  over 3 Minutes Intravenous EVERY 8 HOURS SCHEDULED 17 1458               Contrast Orders - past 72 hours     None          Interpretation of levels and current regimen:  Trough level is  Therapeutic    Has serum creatinine changed > 50% in last 72 hours: No    Urine output:  unable to determine, pt incontinent of urine but getting straight cath for volumes > 350 ml    Renal Function: Stable    Plan:  1.  1250 mg IV x once and then recheck levels tomorrow AM. Levels are therapeutic at ~21 hour trough. Would recheck levels again tomorrow prior to dosing to ensure patient is not accumulating  2.  Pharmacy will check trough levels as appropriate in 1-3 Days.    3. Serum creatinine levels will be ordered daily for the first week of therapy and at least twice weekly for subsequent weeks.      Irene Ford  PharmD        .

## 2017-11-20 NOTE — PROGRESS NOTES
[facility], [place]  Internal Medicine Progress Note - Gold Service      Assessment & Plan   Sandy Sheffield is a 85 year old female admitted on 11/16/2017. She has a history of HTN, DM type II, CKD, CAD, COPD, lupus, and AAA s/p endovascular repair (10/7/2017) c/b CVA and is admitted for suspected infection of her aortic graft and SANDRO.    For Today:  - Add on procalcitonin   - PET/CT (non-contrast) ordered for 11/21  - Start melatonin 3mg q HS for sleep aid  - Ireland catheter for urinary retention     # Suspected Aortic Graft Infection.  Incidentally found 9 cm descending AAA on imaging 10/6/2017; s/p ubsequently underwent endovascular repair with concurrent left renal stent placement on 10/7/17 by Dr. Casillas. Presented 11/16 with increased abdominal pain, weight loss, fatigue, and necrotic lesions on toes. Obtained CT abd/pelvis which revealed large fluid collection surrounding endovascular graft with multiple foci of intraluminal air concerning for infected graft vs thrombus; no evidence of hemorrhage to suggest aortic leak. Vascular surgery consulted - patient not a good candidate for explantation, recommend conservative tx. ID consulted. Blood cultures 11/16 NGTD, which is unusual for graft infection. Afebrile.  -> 130 -> 120 -> 84 (today). No evidence of sepsis. Case discussed with vascular surgery and ID today. Vascular not very impressed w CT findings, ? Active infection present.   - repeat procalcitonin today per ID and Vasc recs  - PET/CT tomorrow to further evaluate active infection (discussed w radiology, non-contrast study appropriate)  - Continue IV vancomycin and zosyn for now  - Pain:  IV dilaudid prn, use sparingly  - Repeat CBC, BMP, CRP in AM  - Palliative consulted. Awaiting recs.     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  Noted progressive discoloration of bilateral great toes x 1 week PTA. Concern for thromboembolic event in setting of  recent endovascular repair. Both toes with dark discoloration/eschar of tips, stable. Sensation intact in distal extremities, + pedal pulses.  - Doppler US q6h to ensure patent blood flow  - Vascular surgery following      # SANDRO on CKD. Creatinine elevated at 4.27 on admission. Initial improvement w IVF, now stable at 3.8. Baseline Cr of approx 2.2-2.4. Left renal stent placed on 10/7. Renal US on admission with patent blood flow to both kidneys. Likely SANDRO multifactorial in setting of hypovolemia . UOP improved. Per discussion w family, would not be interested in HD if declining.  - cont IV D5 1/2 NS + 20 mEq KCl @ 75 cc/hour  - I&O, daily weights  - Renally dose medications  - Avoid nephrotoxic agents as able  - Trend BMP     # Dysphagia with possible acute aspiration event.  Concern for possible aspiration event overnight 11/17. CXR revealed increased moderate right pleural effusion with overlying atelectasis or infection. Afebrile. WBC normal. Vitals stable. SLP consulted. Currently stable on DD1 diet. Family notes increased cough today. Otherwise stable.  - DD1 diet  - SLP following    # Recent posterior circulation CVA.  MRI brain 10/11 following AAA repair revealed abnormal diffusion restriction in left > right occipital lobes and bilateral inferior cerebellar hemispheres c/w acute infarct. Felt possibly related to endovascular procedure. Cardioembolic source felt to be less likely. Repeat echo 11/16 negative for vegetations. Started on ASA and high-dose statin for stroke prophylaxis. Patient with some residual cognitive deficits.   - Cont PT/OT/SLP  - ASA and statin on hold d/t dysphagia     # Normocytic Anemia.  Hgb 8.4 (8.2), BL ~10-11 although limited data. No acute s/s of bleeding since admission. Likely anemia in setting of recent surgery, poor nutrition.  - Trend CBC     # Hypokalemia.  Likely d/t poor PO intake.   - Replacement protocol  - Trend BMP    Chronic Medical Problems:  # Lupus:  No records  on file. No PTA medications. Elevated ESR and CRP on admission felt to be d/t infectious source.   # DM2:  A1C 6.7% this admission. Diet controlled. Hyperglycemic on admission. Now well controlled on medium intensity SSI.   # COPD:  No wheezing on exam. Cont albuterol PRN.   # CAD:  ASA on hold d/t aspiration risk.          Diet: Dysphagia Diet Level 1 Pureed Honey Thickened Liquids (pre-thickened or use instant food thickener) (Use spoon only - no cup or straw)  Room Service  Fluids: As above  DVT Prophylaxis: Pneumatic Compression Devices  Code Status: DNR/DNI    Disposition Plan   Expected discharge: 4 - 7 days, recommended to TBD once work up for graft infection completed, treatment plan outlined, dispo decided.     Entered: Dimitry Grande 2017, 11:59 AM   Information in the above section will display in the discharge planner report.      The patient's care was discussed with the Attending Physician, Dr. Carlson.    Dimitry Grande  Internal Medicine Staff Hospitalist Service  [facility]  Pager: [phone]  Please see sticky note for cross cover information  _____________________________________________________________    Interval History   Met with family and patient separately. Daughter, Mary, reports worsening confusion, agitation and visual hallucinations over the past few days. States patient is noted talking to family members who are  and has commented that there are animals in the room. Nursing report ongoing issues with urinary retention requiring straight cath x2 in past 24h.     Patient reports doing well. She denies any complaints. No abdominal pain, nausea, vomiting, diarrhea. No cough, dyspnea or chest pain. No urinary complaints. She does not recall need for straight cath.     She is oriented to location and reason for hospitalization. Also oriented to month and current president. Not oriented to day of week, date and year.     ROS:  CV, Pulm, GI,  otherwise negative  unless noted in interval history.       Data reviewed today: I reviewed all medications, new labs and imaging results over the last 24 hours.     Physical Exam   Vital Signs: Temp: 96  F (35.6  C) Temp src: Axillary BP: 116/54 Pulse: 87   Resp: 18 SpO2: 99 % O2 Device: Simple face mask Oxygen Delivery: 3 LPM  Weight: 0 lbs 0 oz    GENERAL:  Awake. Alert. Oriented x 2. NAD. O2 via NC at 2 lpm.   HEENT: No scleral icterus. Mucous membranes moist.   CV: RRR. S1, S2. No murmurs appreciated.   RESPIRATORY: Lungs CTAB with no wheezing, rales, rhonchi.   GI: Abdomen soft and non distended. Active bowel sounds. Tender to palpation in the mid abdomen. No guarding or rebound. No mass or HSM.    NEUROLOGICAL: No gross focal deficits. Moves all extremities.    EXTREMITIES: No peripheral edema. Intact bilateral pedal pulses.   SKIN: No jaundice. No rashes.            Data     Recent Labs  Lab 11/20/17  0700 11/19/17  0629 11/18/17  0803 11/17/17  0824  11/16/17  1714    137 136 136  < > 132*   POTASSIUM 3.3* 3.5 3.4 3.5  < > 3.1*   CHLORIDE 112* 109 111* 106  < > 100   CO2 21 17* 14* 19*  < > 24   ANIONGAP 8 10 11 11  < > 9   * 131* 137* 107*  < > 184*   BUN 36* 42* 45* 49*  < > 57*   CR 3.75* 3.87* 3.82* 3.89*  < > 4.27*   OKSANA 7.9* 8.4* 7.8* 8.2*  < > 8.6   MAG  --   --  2.2  --   --  2.5*   PROTTOTAL  --   --  5.8*  --   --  7.0   ALBUMIN  --   --  1.8*  --   --  2.4*   BILITOTAL  --   --  0.2  --   --  0.4   ALKPHOS  --   --  38*  --   --  45   AST  --   --  27  --   --  24   ALT  --   --  18  --   --  19   < > = values in this interval not displayed.    Recent Labs  Lab 11/20/17  0700 11/19/17  0629 11/18/17  0803 11/17/17  0824   WBC 7.4 9.3 8.7 10.2   RBC 3.07* 3.16* 3.06* 3.26*   HGB 8.1* 8.4* 8.2* 8.7*   HCT 26.7* 27.6* 28.3* 28.2*   MCV 87 87 93 87   MCH 26.4* 26.6 26.8 26.7   MCHC 30.3* 30.4* 29.0* 30.9*   RDW 15.5* 15.3* 15.8* 15.1*    187 174 199       Recent Labs  Lab 11/16/17  1756   INR 1.20*

## 2017-11-21 NOTE — PROGRESS NOTES
[facility], [place]  Internal Medicine Progress Note - Gold Service      Assessment & Plan   Sandy Sheffield is a 85 year old female admitted on 11/16/2017. She has a history of HTN, DM type II, CKD, CAD, COPD, lupus, and AAA s/p endovascular repair (10/7/2017) c/b CVA and is admitted for suspected infection of her aortic graft and SANDRO. Hospital stay c/b acute delirium.     For Today:  1. Schedule zyprexa 5mg QHS; cont zyprexa 5mg BID prn for agitation.   2. Check TSH and B12 to further evaluate delirium.      # Suspected Aortic Graft Infection.  Incidentally found 9 cm descending AAA on imaging 10/6/2017; s/p ubsequently underwent endovascular repair with concurrent left renal stent placement on 10/7/17 by Dr. Casillas. Presented 11/16 with increased abdominal pain, weight loss, fatigue, and necrotic lesions on toes. Obtained CT abd/pelvis which revealed large fluid collection surrounding endovascular graft with multiple foci of intraluminal air concerning for infected graft vs thrombus; no evidence of hemorrhage to suggest aortic leak. Vascular surgery consulted - patient not a good candidate for explantation, recommend conservative tx. ID consulted. Blood cultures 11/16 NGTD, which is unusual for graft infection. Afebrile. CRP trending towards normal. No evidence of sepsis. Case discussed with vascular surgery and ID 11/20. Per vascular surgery, CT findings not very impressive - raising question of whether there is active infection. Repeat procalcitonin only 0.24.   - PET-CT ordered but insurance will not cover. Discussed with Vasc Surg who will look into situation 11/22 as this would be most appropriate study to further evaluate. Tagged WBC study felt to be less helpful.   - Continue IV vancomycin and zosyn for now  - Pain:  IV dilaudid prn, use sparingly  - Repeat CBC, BMP, CRP in AM  - Palliative consulted. Family considering palliative/hospice if graft infected and little chance for  meaningful recovery.     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  Noted progressive discoloration of bilateral great toes x 1 week PTA. Concern for thromboembolic event in setting of recent endovascular repair. Both toes with dark discoloration/eschar of tips, stable. Sensation intact in distal extremities, + pedal pulses.  - Doppler US q6h to ensure patent blood flow  - Vascular surgery following      # SANDRO on CKD. Creatinine elevated at 4.27 on admission. Initial improvement w IVF, now stable at 3.8. Baseline Cr of approx 2.2-2.4. Left renal stent placed on 10/7. Renal US on admission with patent blood flow to both kidneys. Likely SANDRO multifactorial in setting of hypovolemia . UOP improved. Per discussion w family, would not be interested in HD if declining.  - cont IV D5 1/2 NS + 20 mEq KCl @ 75 cc/hour  - I&O, daily weights  - Renally dose medications  - Avoid nephrotoxic agents as able  - Trend BMP     # Dysphagia with possible acute aspiration event.  Concern for possible aspiration event overnight 11/17. CXR revealed increased moderate right pleural effusion with overlying atelectasis or infection. Afebrile. WBC normal. Vitals stable. SLP consulted. Currently stable on DD1 diet. Family notes increased cough today. Weaned off supplemental O2. On broad spectrum abx which would adequately cover CAP and aspiration pneumonia. Given improving CRP, WBC, and stable O2 sats on room air, PNA felt to be less likely.   - Cont DD1 diet  - SLP following    # Recent posterior circulation CVA.  MRI brain 10/11 following AAA repair revealed abnormal diffusion restriction in left > right occipital lobes and bilateral inferior cerebellar hemispheres c/w acute infarct. Felt possibly related to endovascular procedure. Cardioembolic source felt to be less likely. Repeat echo 11/16 negative for vegetations. Started on ASA and high-dose statin for stroke prophylaxis. Patient with some residual cognitive  deficits.   - Cont PT/OT/SLP  - ASA and statin on hold d/t dysphagia     # Normocytic Anemia.  Hgb 8.4 (8.2), BL ~10-11 although limited data. No acute s/s of bleeding since admission. Likely anemia in setting of recent surgery, poor nutrition.  - Trend CBC     # Hypokalemia.  Likely d/t poor PO intake.   - Replacement protocol  - Trend BMP    Chronic Medical Problems:  # Lupus:  No records on file. No PTA medications. Elevated ESR and CRP on admission felt to be d/t infectious source. Nothing clearly pointing to lupus at this time. Would consider further evaluation if infectious work up negative.   # DM2:  A1C 6.7% this admission. Diet controlled. Hyperglycemic on admission. Now well controlled on medium intensity SSI.   # COPD:  No wheezing on exam. Cont albuterol PRN.   # CAD:  ASA on hold d/t aspiration risk.          Diet: Dysphagia Diet Level 1 Pureed Honey Thickened Liquids (pre-thickened or use instant food thickener) (Use spoon only - no cup or straw)  Room Service  Snacks/Supplements Adult: Magic Cup; Between Meals  Calorie Counts  Fluids: As above  DVT Prophylaxis: Pneumatic Compression Devices  Code Status: DNR/DNI    Disposition Plan   Expected discharge: 4 - 7 days, recommended to TBD once work up for graft infection completed, treatment plan outlined, dispo decided.     Entered: Dimitry Grande 11/21/2017, 4:12 PM   Information in the above section will display in the discharge planner report.      The patient's care was discussed with the Attending Physician, Dr. Carlson.    Dimitry Grande  Internal Medicine Staff Hospitalist Service  [facility]  Pager: [phone]  Please see sticky note for cross cover information  _____________________________________________________________    Interval History   Patient confused and frequently hallucinating. Patient denies complaints. Family at bedside. Daughter, Mary, notes overall worsening in delirium. She also notes frequent throat clearing and  productive cough.     ROS:  4 point ROS attempted but patient unable to participate.      Data reviewed today: I reviewed all medications, new labs and imaging results over the last 24 hours.     Physical Exam   Vital Signs: Temp: 98.6  F (37  C) Temp src: Axillary BP: 133/67 Pulse: 86 Heart Rate: 92 Resp: 20 SpO2: 97 % O2 Device: None (Room air) Oxygen Delivery: 2 LPM  Weight: 183 lbs 11.2 oz    GENERAL:  Awake. Alert. Disoriented. Frequent reaching. O2 via NC at 2 lpm.   HEENT: No scleral icterus. Mucous membranes moist.   CV: RRR. S1, S2. No murmurs appreciated.   RESPIRATORY:  Diminished in bases, otherwise clear. No rhonchi, wheeze or rales.   GI: Abdomen soft and non distended. Active bowel sounds. Tender to palpation in the mid abdomen. No guarding or rebound. No mass or HSM.    NEUROLOGICAL: No gross focal deficits. Moves all extremities.  No slurred speech or facial droop. Patient frequently reaching into pockets and grabbing at objects that do not exist.   EXTREMITIES: No peripheral edema. Intact bilateral pedal pulses.   SKIN: No jaundice. No rashes.            Data     Recent Labs  Lab 11/21/17  0857 11/20/17  0700 11/19/17  0629 11/18/17  0803  11/16/17  1714    141 137 136  < > 132*   POTASSIUM 3.5 3.3* 3.5 3.4  < > 3.1*   CHLORIDE 113* 112* 109 111*  < > 100   CO2 19* 21 17* 14*  < > 24   ANIONGAP 10 8 10 11  < > 9   * 132* 131* 137*  < > 184*   BUN 32* 36* 42* 45*  < > 57*   CR 3.74* 3.75* 3.87* 3.82*  < > 4.27*   OKSANA 7.9* 7.9* 8.4* 7.8*  < > 8.6   MAG  --   --   --  2.2  --  2.5*   PROTTOTAL  --   --   --  5.8*  --  7.0   ALBUMIN  --   --   --  1.8*  --  2.4*   BILITOTAL  --   --   --  0.2  --  0.4   ALKPHOS  --   --   --  38*  --  45   AST  --   --   --  27  --  24   ALT  --   --   --  18  --  19   < > = values in this interval not displayed.    Recent Labs  Lab 11/21/17  0857 11/20/17  0700 11/19/17  0629 11/18/17  0803   WBC 7.8 7.4 9.3 8.7   RBC 3.50* 3.07* 3.16* 3.06*   HGB 9.3*  8.1* 8.4* 8.2*   HCT 30.1* 26.7* 27.6* 28.3*   MCV 86 87 87 93   MCH 26.6 26.4* 26.6 26.8   MCHC 30.9* 30.3* 30.4* 29.0*   RDW 15.7* 15.5* 15.3* 15.8*    189 187 174       Recent Labs  Lab 11/16/17  1756   INR 1.20*       Recent Labs  Lab 11/21/17  0857 11/20/17  0700 11/19/17  0629 11/18/17  0803 11/17/17  0824   CRP 63.0* 84.0* 120.0* 110.0* 120.0*   SED  --   --   --   --  92*         Physician Attestation   I, Jace Hernández, saw and evaluated Sandy Sheffield as part of a shared visit.  I have reviewed and discussed with the advanced practice provider their history, physical and plan.    I personally reviewed the vital signs, medications, labs and imaging.    My key history or physical exam findings: Patient seen and examined today.  Remains intermittently confused, agitated, and occasionally halucinating.  Still feels that she has some abdominal pain, but otherwise denies other symptoms.     PE:   VS: Afebrile and stable  GEN: NAD  CV: RRR S1/S2, no m,r,g  Pulm: CTA b/l  Abd: soft, mild diffuse abdominal tenderness, +BS  Ext: warm and well perfused, no edema    Key management decisions made by me: Ms Sheffield is a 85 year rold female with HTN, DM II, CKD, CAD, lupus, with a recent AAA with endovascular repair.  Admitted with delirium and concern for a AAA graft infection.  CRP is downtrending on antibiotics, and no other clear source of infection.  out infection vs post surgical changes is challenging given the lack of positive blood cultures, however the lack of other source of infection, improvement on treatment, and lack of clear trigger for her delirium, my suspicion for an endograft infection is high.  PET scan will not be covered by insurance, will discuss the possibility of a tagged white cell scan (and utility in  infection from surgical changes).  This will depend on the families desire to continue long term treatment, and ongoing association with the medical system, vs a  more paliative approach.       Jace Hernández  Date of Service (when I saw the patient): 11/21/17

## 2017-11-21 NOTE — PLAN OF CARE
Problem: Patient Care Overview  Goal: Plan of Care/Patient Progress Review  Outcome: No Change  VSS. Denies pain, has lidocaine patch on mid-back. Oriented only to self, VPM and bed alarm in place for safety, family at bedside most of the shift as well. Zyprexa given for agitation. Abd with irregular contour/hernia, hypo BS, denies passing flatus, no BM since 11/16, MD aware. Ireland patent, adeq uop, cath cares done. Pressure injury in coccyx with intact mepilex, turned and repositioned q2hrs using lift, pt needs distraction with cares (2-assist and 1 to hold pt's hands). Big toes are necrotic/black, +pulses, rook boots in place. Pt tolerating small amounts of Dysphagia diet level1 and thickened liqs using spoon with nursing staff supervision, BG this [place] was 181-1unit insulin given per sliding scale. Midline IV infusing, IV abx given.   P: continue to monitor status and continue with POC.    Addendum: Pt in pulsate mattress.

## 2017-11-21 NOTE — PLAN OF CARE
Problem: Patient Care Overview  Goal: Plan of Care/Patient Progress Review  Outcome: No Change  VSS. 2L NC with humidity. Continuous pulse ox on.    Alert and oriented to self. Haldol administered to aid in patients anxiety with macario placement. Haldol caused the patient to hallucinate and see spiders, dogs and cats etc. RN reoriented patient as appropriate. Macario now in place with adequate urine output. Macario is to stay in place d/t retention. + PG/-BM. Last charted BM was 11/16. Patient refused to take any oral PO pills this evening. A liquid suspension bowel medication is recommended. Dysphagia level 1, honey thick liquids. Patient ate minimal amount of meal d/t disliking the taste of thickened liquids. Q4h blood sugar checks. No correction insulin administered. VPM and bed alarm in use. Patient pulled out midline and 1 PIV. In addition 1 PIV went bad. Dorsalis pedis pulses +2 and bounding. Tibial pulses +1 weak. Both are marked on the feet. Q2h turn. Heels suspended.Red and black toes. Pressure ulcer present on coccyx. Dressing to be change q3 days. Applied today 11/21. Dilaudid administered x 1 for abdominal pain with relief. Pt currently refusing to turn enough to allow RN to apply lidocaine patch on back. Zyprexa administered to aid in sleep. Pt tucked into bed @ 2130 and up wide awake restless pulling at lines until 0200. Restraint orders in place if needed. Unsuccessful management with MITTS.       Bed Algorithm used-- Standard ROTATE mattress ordered  Heel Algorithm Prevalon boots ordered    *RN agrees with student nurses charting    Patient would like to be woken for bedside shift report.

## 2017-11-21 NOTE — PHARMACY-VANCOMYCIN DOSING SERVICE
Pharmacy Vancomycin Note  Date of Service 2017  Patient's  1932   85 year old, female    Indication: Possible AAA graft infection  Goal Trough Level: 15-20 mg/L  Day of Therapy: 5  Current Vancomycin regimen:  1250 mg IV once (intermittent dosing)    Current estimated CrCl = Estimated Creatinine Clearance: 12.4 mL/min (based on Cr of 3.74).    Creatinine for last 3 days  2017:  6:29 AM Creatinine 3.87 mg/dL  2017:  7:00 AM Creatinine 3.75 mg/dL  2017:  8:57 AM Creatinine 3.74 mg/dL    Recent Vancomycin Levels (past 3 days)  2017:  6:29 AM Vancomycin Level 6.1 mg/L  2017:  7:00 AM Vancomycin Level 15.6 mg/L  2017:  8:57 AM Vancomycin Level 22.0 mg/L    Vancomycin IV Administrations (past 72 hours)                   vancomycin (VANCOCIN) 1,250 mg in NaCl 0.9 % 250 mL intermittent infusion (mg) 1,250 mg New Bag 17 1038    vancomycin (VANCOCIN) 1,250 mg in NaCl 0.9 % 250 mL intermittent infusion (mg) 1,250 mg New Bag 17 1032                Nephrotoxins and other renal medications (Future)    Start     Dose/Rate Route Frequency Ordered Stop    17 1100  vancomycin (VANCOCIN) 1000 mg in dextrose 5% 200 mL PREMIX      1,000 mg  200 mL/hr over 1 Hours Intravenous ONCE 17 1107      17 1530  vancomycin place suarez - receiving intermittent dosing      1 each Does not apply SEE ADMIN INSTRUCTIONS 17 1531      17 1500  piperacillin-tazobactam (ZOSYN) 2.25 g in 10 mL NS Premix Syringe      2.25 g  over 3 Minutes Intravenous EVERY 8 HOURS SCHEDULED 17 1458               Contrast Orders - past 72 hours     None          Interpretation of levels and current regimen:  Trough level is  Supratherapeutic    Has serum creatinine changed > 50% in last 72 hours: No    Urine output:  unable to determine    Renal Function: Stable    Plan:  1.  Decrease dose to 1,000 mg IV once and then re-dose based on levels. Anticipate patient will be  therapeutic on 1,000 mg q24h dosing but will check levels again tomorrow given unresolved SANDRO. If therapeutic, can start q24h dosing and check levels after 2-3 days.  2.  Pharmacy will check trough levels as appropriate in 1-3 Days.    3. Serum creatinine levels will be ordered daily for the first week of therapy and at least twice weekly for subsequent weeks.      Irene Ford, PharmD        .

## 2017-11-21 NOTE — PROGRESS NOTES
Patient A&O to self, place, and situation. VSS on 3L O2 via nasal cannula. Pain controlled with prn Dilaudid. Confused and agitated at times. Patient pulled out midline IV, then peripheral IV. Slava restraints applied and patient managed to pull out new PIV. Ireland catheter placed during shift for persistent urinary retention. Tolerating dysphagia level 2 diet, thickened honey liquids, aspiration precautions. Turn and reposition q2 hr. No BM today, but passing flatus. Son at bedside.

## 2017-11-21 NOTE — PLAN OF CARE
Problem: Patient Care Overview  Goal: Plan of Care/Patient Progress Review  Outcome: No Change  VSS. Continuous pulse ox on. 2L NC with humidification. Q4h blood glucose checks. No insulin administered. Up with life. q2h turn. Prevalon boots on. Bed ordered. Coccyx mepilex on and intact. To be changed q3 days. Applied by WOC on 11/20. PIV intact and wrapped with MIVF running @ 75 mL/hr. Pain is being managed with IV Dilaudid prn. Zyprexa given to aid with sleep. Pt sleep minimally throughout the night. She often referred to objects in the room as animals or people. RN redirected pt as appropriate and provided reassurance. Son slept at the bedside throughout the night. Dysphagia level 1 diet with honey thick liquids. Calorie count. VPM and bed alarm in use. Dorsalis pedis (+2 and bonding) and Tibial (+1 and weak). Toes and necrotic and ave surrounding erythema. [place] in place with adequate output. [place] cares completed. PET scan ordered for today. Time TBD. Must be NPO for 6 hours b/f the scan. Per family they would like the pet scan information to help determine their next step... ie comfort cares. Continue POC.

## 2017-11-21 NOTE — PHARMACY-ADMISSION MEDICATION HISTORY
Admission medication history interview status for the 11/16/2017 admission is complete. See Epic admission navigator for allergy information, pharmacy, prior to admission medications and immunization status.     Medication history interview sources:  Patient and [facility]    Changes made to PTA medication list (reason)  Added: Medications added at facility (oxycodone, polyethylene glycol powder, senna-docusate, and ondansetron)  Deleted: Medications discontinued at facility (lidocaine patches, melatonin, and fish oil)  Changed: Alprazolam (changed directions, still 0.5 mg total daily dose)    Additional medication history information (including reliability of information, actions taken by pharmacist): Patient was a poor historian. Obtained medical administration records from assisted living facility and updated medications.    Patient could not recall receiving influenza vaccination this season and no records in MAR or [facility]. Patient indecisive about receiving influenza vaccination during hospital stay.      Prior to Admission medications    Medication Sig Last Dose Taking? Auth Provider   ALPRAZOLAM PO Take 0.25 mg by mouth 2 times daily 11/16/2017 at 0800 Yes Unknown, Entered By History   OXYCODONE HCL PO Take 2.5 mg by mouth 2 times daily 11/15/2017 at 1800 Yes Unknown, Entered By History   OXYCODONE HCL PO Take 2.5 mg by mouth every 6 hours as needed 11/16/2017 at 1500 Yes Unknown, Entered By History   polyethylene glycol (MIRALAX/GLYCOLAX) powder Take 17 g by mouth daily Hold for loose stools 11/16/2017 at 0800 Yes Unknown, Entered By History   senna-docusate (SENOKOT-S;PERICOLACE) 8.6-50 MG per tablet Take 2 tablets by mouth daily Hold for loose stools 11/16/2017 at 0800 Yes Unknown, Entered By History   ONDANSETRON PO Take 4 mg by mouth every 8 hours as needed for nausea 11/13/2017 at 1800 Yes Unknown, Entered By History   scopolamine (TRANSDERM) 72 hr patch Place 1 patch onto the skin every 72  hours 11/16/2017 at 0900 Yes Jessica Moya APRN CNS   rosuvastatin (CRESTOR) 40 MG tablet Take 1 tablet (40 mg) by mouth daily 11/15/2017 at 2100 Yes Jessica Moya APRN CNS   albuterol (PROVENTIL HFA) 108 (90 BASE) MCG/ACT Inhaler Inhale 2 puffs into the lungs every 6 hours as needed  Past Month at Unknown time Yes Unknown, Entered By History   ASPIRIN PO Take 81 mg by mouth daily 11/16/2017 at 0900 Yes Reported, Patient         Medication history completed by: Josseline Hernadez, PharmD4 Student

## 2017-11-21 NOTE — PROGRESS NOTES
[facility], [place]    Palliative Care Progress Note    Patient: Sandy Sheffield  Date of Admission:  11/16/2017    Recommendations:  -Per discussion with Mary, daughter, she wants more answers and she agrees to additional work up including for infection and for answers to why patient is more altered (please see discussion below)  -Concern for constipation, would obtain abdominal xray to assess stool burden   -add schedule senna 2 tablets BID  -add miralax daily  Next steps: can add lactulose 15-30 mg daily PRN  -Could consider scheduling olanzapine 5 mg BID in the setting of patient with agitated delirium  -Would also consider scheduled tylenol PO or IV, family reports her lupus in the past has impacted her liver and was told not to take it, last liver function tests were WNL    Assessment  Sandy Sheffield is a 85 year old female with with a complex PMH including HTN, DMII, CKD, CAD, COPD, Lupus, as well as a AAA s/p endovascular repair (10/7/2017). Patient presented on 11/16/17 with one week of increasing progressive fatigue, abdominal pain, bilateral LE changes and pain. On admission she was found to have ASNDRO on CKD, positive UA, concern for endovascular leak versus ischemia. She underwent testing and is now suspected of having an infected AAA graft which is complicated due to no growth of organisms on blood cultures. She was determined not to be a good candidate for explantation of the graft. There is also concern for thromboemolic event in the setting of her recent endovascular repair. Also had possible aspiration event 11/17, SLP saw and recommended DD1 diet. Family reports that patient has had progressive episodes of cognitive decline during hospitalization.     Symptoms:    Agitation: worsened overnight, pulled out lines, getting haldol with partial effectiveness, family observes her hallucinate worse after haldol, family reports that dilaudid seems to calm her down.   Constipation:  last documented bowel movement was 11/16, daughter believes that it has been a week since her last BM, daughter reports she was pretty regular when she was on her miralax daily.    Advance Care Planning:               Decision making capacity: No       Disease understanding: family has pretty good understanding       Goals of Care: currently family wishes to explore if there are more answers and conditions that can be treated.       Preferred way of decision making: Mary involves family and wants this decision to me jointly decided       Code Status:  DNR / DNI       POLST There is no POLST (Physician orders for life-sustaining treatment) form on file for this patient    Discussion  Visited with Mary and Nahid this morning, reviewed her difficult night with agitation. Discussed current situation and also discussed at length management of delirium and lack of clarity on what is exactly causing her to have these changes. Discussed with Mary at a later visit today her thoughts right now as to how to proceed, Mary things she would want additional answers if there are any and discussed that would mean additional work up for the infection, and then discussed if either the test cant be done OR the test does not show signs suggestive of infection that there can be more work up for her condition of abdominal pain and confusion, discussed that there could be the potential for going through that would out additional answers. Mary tells me that it is important for their family to try to get more answers because they could not live with not knowing if they didn't treat something that was potential reversible and they would better understand what was causing her decline into what might be end of life.    These recommendations have been discussed with primary team, JORDAN RINCON.    JERROD Alvarado CNS  Palliative Care Consult Team  Pager: [phone]    [facility] Inpatient Team Consult pager [phone] (M-F 8-4:30)  After-hours Answering  Service [phone]   Palliative Clinic: [phone]       Interval History:      Worsening agitation overnight, pulled out IV, requiring midline placement this morning, she was redirectable and interacting with daughter pleasantly.     Medications:   I have reviewed this patient's medication profile and medications during this hospitalization  Reviewed: Scopolamine discontinued  Hydromorphone 0.3 mg q4h PRN  Haldol 1 mg q6h PRN  zyprexa started 2 times a day PRN          Review of Systems:   Palliative Symptom Review (0=no symptom/no concern, 1=mild, 2=moderate, 3=severe):      Unable to obtain due to AMS          Physical Exam:     Vital Signs: Temp: 97.8  F (36.6  C) Temp src: Oral BP: 122/63 Pulse: 79 Heart Rate: 92 Resp: 18 SpO2: 98 % O2 Device: Nasal cannula Oxygen Delivery: 2 LPM  Weight: 183 lbs 11.2 oz    Physical Exam:  Constitutional: Awake, alert, cooperative, no apparent distress  Lungs: No increased work of breathing  Neurologic: Awake, alert, illogical statements, oriented to self.  Neuropsychiatric: unable to obtain due to AMS    Data Reviewed:     ROUTINE ICU LABS (Last four results)  CMP  Recent Labs  Lab 11/21/17  0857 11/20/17  0700 11/19/17  0629 11/18/17  0803  11/16/17  1714    141 137 136  < > 132*   POTASSIUM 3.5 3.3* 3.5 3.4  < > 3.1*   CHLORIDE 113* 112* 109 111*  < > 100   CO2 19* 21 17* 14*  < > 24   ANIONGAP 10 8 10 11  < > 9   * 132* 131* 137*  < > 184*   BUN 32* 36* 42* 45*  < > 57*   CR 3.74* 3.75* 3.87* 3.82*  < > 4.27*   GFRESTIMATED 12* 11* 11* 11*  < > 10*   GFRESTBLACK 14* 14* 13* 14*  < > 12*   OKSANA 7.9* 7.9* 8.4* 7.8*  < > 8.6   MAG  --   --   --  2.2  --  2.5*   PROTTOTAL  --   --   --  5.8*  --  7.0   ALBUMIN  --   --   --  1.8*  --  2.4*   BILITOTAL  --   --   --  0.2  --  0.4   ALKPHOS  --   --   --  38*  --  45   AST  --   --   --  27  --  24   ALT  --   --   --  18  --  19   < > = values in this interval not displayed.  CBC  Recent Labs  Lab 11/21/17  0839  11/20/17  0700 11/19/17  0629 11/18/17  0803   WBC 7.8 7.4 9.3 8.7   RBC 3.50* 3.07* 3.16* 3.06*   HGB 9.3* 8.1* 8.4* 8.2*   HCT 30.1* 26.7* 27.6* 28.3*   MCV 86 87 87 93   MCH 26.6 26.4* 26.6 26.8   MCHC 30.9* 30.3* 30.4* 29.0*   RDW 15.7* 15.5* 15.3* 15.8*    189 187 174     INR  Recent Labs  Lab 11/16/17  1756   INR 1.20*     Arterial Blood GasNo lab results found in last 7 days.      JERROD Alvarado CNS  Palliative Care Consult Team  Pager: [phone]    [facility] Inpatient Team Consult pager [phone] (M-F 8-4:30)  After-hours Answering Service [phone]  Palliative Clinic: [phone]     Total time spent was 80 minutes,  >50% of time was spent counseling and/or coordination of care regarding goals of care and symptom management.  40 min of that were spent face-to-face, in 1130, out 1210.

## 2017-11-21 NOTE — PLAN OF CARE
Problem: Patient Care Overview  Goal: Plan of Care/Patient Progress Review  Discharge Planner SLP   Patient plan for discharge: Unknown   Current status: Pt remains appropriate for dysphagia diet level 1 and honey thick liquids with 1:1 supervision.  No straws.  Pt will need to pace self, take small bites/sips, and alternate consistencies.  Pt severely limited by confusion and active hallucinations.    Barriers to return to prior living situation: Confusion, limited PO intake    Recommendations for discharge: Rehab  Rationale for recommendations: Below baseline        Entered by: Sarita Del Cid 11/21/2017 10:10 AM

## 2017-11-21 NOTE — PROGRESS NOTES
"Vascular Surgery Progress Note     Patient seen and evaluated this am. She continues to be confused/disoriented.     /75 (BP Location: Right arm)  Pulse 92  Temp 99.4  F (37.4  C) (Oral)  Resp 22  Ht 5' 8\"  Wt 183 lb 11.2 oz  SpO2 98%  BMI 27.93 kg/m2    Intake/Output Summary (Last 24 hours) at 11/21/17 0743  Last data filed at 11/21/17 0454   Gross per 24 hour   Intake          2031.25 ml   Output             1725 ml   Net           306.25 ml     General: Elderly  female resting supine in bed.   Cor: RRR  Pulm: Breathing is unlabored. Face mask in intact with O2 at 3L  Abd: Reducible incisional abdominal hernia. S/NT/ND  LE: Warm and well perfused. Focal darkened areas of necrosis overlying the right and left great toes.      WBC Pending  Cr: Pending  Blood Cultures: Negative to Date     Assessment: Mrs. Sheffiedl is an 86 yo female with history of HTN, HLD, COPD, Posterior circulation CVA, CAD and AAA s/p CHEVAR 10/07 admitted with positive UA and CT evidence concerning for graft infection.       Plan:      1. Infected Endo graft- Blood cultures remain negative. Inflammatory markers are improving. PET CT today. Will review results. Patient not a surgical candidate for explantation irrespective. Diagnosis of Infected endo graft would guid prognosis.    2. Nutrition Consult  3. Continue Antibiotics.   4. Vascular will follow.        Dhaval Carcamo MD   Vascular Surgery Fellow  Pager: [phone]  "

## 2017-11-21 NOTE — PROGRESS NOTES
Care Coordinator Progress Note     Admission Date/Time:  11/16/2017  Attending MD:  Jace Hernández, *     Data  Chart reviewed, discussed with interdisciplinary team.   Patient was admitted for: Data Unavailable.    Concerns with insurance coverage for discharge needs: None.  Current Living Situation: Patient lives in an assisted living facility.  Support System: Supportive and Involved  Services Involved: PURNIMA  Transportation: Family or Friend will provide  Barriers to Discharge: pending medical work up and MD recommendations.    Coordination of Care and Referrals: Uncertain at this time due to medical condition, confusion, mitts over night, medical work up continues    Per discussion in interdisciplinary rounds, the primary team states that the patient continues to require hospitalization for the following reasons: Family would like to pursue more medical work up regarding pts change in status, delirium, possible infection and treatments.  Team states that the patient will require hospitalization for 3-4 days.      Work up to continue, possible care conference on Friday, RNCC will follow up with medicine team regarding need.  If cause of illness is found, tx will be presented to family, if unknown cause will need CC.       Gerri David, JODRANCC, BSN    [facility]    Medicine Group  [address] [place] [id number]    [email or web address]  [email or web address]    Office: [phone] Pager: [phone]

## 2017-11-21 NOTE — PLAN OF CARE
Problem: Patient Care Overview  Goal: Plan of Care/Patient Progress Review  OT 7C: cancel and HOLD, per discussion with physical therapist, pt is currently not appropriate for 2 therapy disciplines to be following until cognition improves. Will continue to check in to determine when OT is appropriate to initiate.

## 2017-11-21 NOTE — PLAN OF CARE
Problem: Patient Care Overview  Goal: Plan of Care/Patient Progress Review  Discharge Planner PT   Patient plan for discharge: not  discussed  Current status: Pt very confused and hallucinating throughout eval and treatment. Hx obtained from son. Pt able to follow simple, 1 step commands but easily distracted and becoming intermittently agitated. Pt transfers supine<>sit with Rory, sit<>stand with modA and FWW and able to take 2 steps forward and several lateral steps. Pt limited by SOB with activity. O2 increased to 4L to maintain O2 sats in 90's.  Barriers to return to prior living situation: Impaired cognition, BLE weakness, impaired balance, poor functional endurance, high falls risk  Recommendations for discharge: TCU  Rationale for recommendations: Pt needs therapy to improve safety, strength and endurance for all functional mobility before going home to group home.       Entered by: Darcie Tolbert 11/21/2017 1:17 PM

## 2017-11-22 NOTE — PROVIDER NOTIFICATION
Notified Resident at 1230 PM regarding lab results.      Spoke with: MD was paged, page not answered/returned.    Orders were not obtained.    Comments: Writer paged MD about critical vancomycin level of 28.3.    Update: MD ordered redraw of vancomycin for 11/23 in the morning.

## 2017-11-22 NOTE — PROGRESS NOTES
Calorie Counts  Intake recorded for: 11/21 Kcals: 107  Protein: 3g  # Meals Recorded: less than 25% cranberry juice and oatmeal with brown sugar  # Supplements Recorded: 25% 1 Magic Cup

## 2017-11-22 NOTE — PLAN OF CARE
Problem: Patient Care Overview  Goal: Plan of Care/Patient Progress Review  Discharge Planner PT   Patient plan for discharge: TCU  Current status: Pt tx supine<>sit with min A and max VCs for sequencing. Max A x 1 for boost upwards in bed. Pt tx sit<>stand with min-mod A and FWW. Pt pivoted bed<>BSC with min-mod A and FWW, step-by-step cues for safety. Recommend nsg utilize Ax1 and FWW for stand-pivot transfer to/from BSC.   Barriers to return to prior living situation: Cognition, strength, endurance, and balance  Recommendations for discharge: TCU  Rationale for recommendations: Progress strength, stability and endurance for more IND functional mobility       Entered by: Olena Odom 11/22/2017 2:05 PM

## 2017-11-22 NOTE — PROGRESS NOTES
[facility], [place]  Internal Medicine Progress Note - Gold Service      Assessment & Plan   Sandy Sheffield is a 85 year old female admitted on 11/16/2017. She has a history of HTN, DM type II, CKD, CAD, COPD, lupus, and AAA s/p endovascular repair (10/7/2017) c/b CVA and is admitted for suspected infection of her aortic graft and SANDRO. Hospital stay c/b acute delirium.     For Today:  1. PET-CT today per vascular surgery      # Suspected Aortic Graft Infection.  Incidentally found 9 cm descending AAA on imaging 10/6/2017; s/p ubsequently underwent endovascular repair with concurrent left renal stent placement on 10/7/17 by Dr. Casillas. Presented 11/16 with increased abdominal pain, weight loss, fatigue, and necrotic lesions on toes. Obtained CT abd/pelvis which revealed large fluid collection surrounding endovascular graft with multiple foci of intraluminal air concerning for infected graft vs thrombus; no evidence of hemorrhage to suggest aortic leak. Vascular surgery consulted - patient not a good candidate for explantation, recommend conservative tx. ID consulted. Blood cultures 11/16 NGTD, which is unusual for graft infection. Afebrile. CRP trending towards normal on broad spectrum abx. No evidence of sepsis. Case discussed with vascular surgery and ID 11/20. Per vascular surgery, CT findings not very impressive - raising question of whether there is active infection. Repeat procalcitonin only 0.24. Per ID, tagged WBC study with poor specificity, recommending PET-CT to further evaluate.   - PET-CT today, appreciate vascular surgery assistance with this  - Continue IV vancomycin and zosyn for now  - Pain:  IV dilaudid prn, use sparingly  - Repeat CBC, BMP, CRP in AM  - Palliative consulted. Family considering palliative/hospice if graft infected and little chance for meaningful recovery. If equivocal findings on PET-CT, then will need to discuss possibility of completing a 7-14 day  course of abx with ID vs stopping abx altogether.    # Acute metabolic encephalopathy with delirium:  Acute confusion noted during recent hospitalization for AAA repair and CVA. Gradual improvement by the time of discharge with further improvement at TCU. Daughter notes worsening confusion upon transfer to assisted living. Patient with suspected metabolic encephalopathy in setting of infection with acute hospital induced delirium. No electrolyte abnormalities. TSH elevated but free T4 normal. B12 elevated. No other clear source of infection, therefore if graft infection ruled out then likely hospital induced encephalopathy/delirium. Discussed with family who feel that current mental status not felt to be quality lifestyle. Exploring possible hospice regardless of infection. Agitation appears to be improved on scheduled zyprexa.  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  Noted progressive discoloration of bilateral great toes x 1 week PTA. Concern for thromboembolic event in setting of recent endovascular repair. Both toes with dark discoloration/eschar of tips, stable. Sensation intact in distal extremities, + pedal pulses.  - Doppler US q6h to ensure patent blood flow  - Vascular surgery following      # SANDRO on CKD. Creatinine elevated at 4.27 on admission. Initial improvement w IVF, now stable at 3.8. Baseline Cr of approx 2.2-2.4. Left renal stent placed on 10/7. Renal US on admission with patent blood flow to both kidneys. Likely SANDRO multifactorial in setting of hypovolemia . UOP improved. Per discussion w family, would not be interested in HD if declining.  - cont IV D5 1/2 NS + 20 mEq KCl @ 75 cc/hour  - I&O, daily weights  - Renally dose medications  - Avoid nephrotoxic agents as  able  - Trend BMP     # Dysphagia with possible acute aspiration event.  Concern for possible aspiration event overnight 11/17. CXR revealed increased moderate right pleural effusion with overlying atelectasis or infection. Afebrile. WBC normal. Vitals stable. SLP consulted. Currently stable on DD1 diet. Family notes increased cough today. Weaned off supplemental O2. On broad spectrum abx which would adequately cover CAP and aspiration pneumonia. Given improving CRP, WBC, and stable O2 sats on room air, PNA felt to be less likely.   - Cont DD1 diet  - SLP following    # Recent posterior circulation CVA.  MRI brain 10/11 following AAA repair revealed abnormal diffusion restriction in left > right occipital lobes and bilateral inferior cerebellar hemispheres c/w acute infarct. Felt possibly related to endovascular procedure. Cardioembolic source felt to be less likely. Repeat echo 11/16 negative for vegetations. Started on ASA and high-dose statin for stroke prophylaxis. Patient with some residual cognitive deficits.   - Cont PT/OT/SLP  - ASA and statin on hold d/t dysphagia     # Normocytic Anemia.  Hgb 8.4 (8.2), BL ~10-11 although limited data. No acute s/s of bleeding since admission. Likely anemia in setting of recent surgery, poor nutrition.  - Trend CBC     # Hypokalemia.  Likely d/t poor PO intake.   - Replacement protocol  - Trend BMP    Chronic Medical Problems:  # Lupus:  No records on file. No PTA medications. Elevated ESR and CRP on admission felt to be d/t infectious source. Nothing clearly pointing to lupus at this time. Would consider further evaluation if infectious work up negative.   # DM2:  A1C 6.7% this admission. Diet controlled. Hyperglycemic on admission. Now well controlled on medium intensity SSI.   # COPD:  No wheezing on exam. Cont albuterol PRN.   # CAD:  ASA on hold d/t aspiration risk.          Diet: Dysphagia Diet Level 1 Pureed Honey Thickened Liquids (pre-thickened or use instant food  thickener) (Use spoon only - no cup or straw)  Room Service  Snacks/Supplements Adult: Magic Cup; Between Meals  Calorie Counts  Fluids: As above  DVT Prophylaxis: Pneumatic Compression Devices  Code Status: DNR/DNI    Disposition Plan   Expected discharge: 4 - 7 days, recommended to TBD once work up for graft infection completed, treatment plan outlined, dispo decided.     Entered: Dimitry Grande 11/22/2017, 11:19 AM   Information in the above section will display in the discharge planner report.      The patient's care was discussed with the Attending Physician, Dr. Carlson.    Dimitry Grande  Internal Medicine Staff Hospitalist Service  [facility]  Pager: [phone]  Please see sticky note for cross cover information  _____________________________________________________________    Interval History   Patient slept most of night per nursing staff and son, Nahid. No agitation. No evidence of hallucinations. Patient difficult to arouse this AM but denies any complaints.     ROS:  4 point ROS attempted but patient unable to participate.      Data reviewed today: I reviewed all medications, new labs and imaging results over the last 24 hours.     Physical Exam   Vital Signs: Temp: 98  F (36.7  C) Temp src: Oral BP: 129/68 Pulse: 86 Heart Rate: 87 Resp: 20 SpO2: 98 % O2 Device: Nasal cannula Oxygen Delivery: 2 LPM  Weight: 196 lbs 3.35 oz    GENERAL:  Sleepy but awakens to voice. Follows commands. Appears comfortable. O2 via NC at 2 lpm.   HEENT: No scleral icterus. Mucous membranes moist.   CV: RRR. S1, S2. No murmurs appreciated.   RESPIRATORY:  Diminished in bases, otherwise clear. No rhonchi, wheeze or rales.   GI: Abdomen soft and non distended. Active bowel sounds. Tender to palpation in the mid abdomen. No guarding or rebound. No mass or HSM.    NEUROLOGICAL: No gross focal deficits.   EXTREMITIES: No peripheral edema. Intact bilateral pedal pulses.   SKIN: No jaundice. No rashes.            Data      Recent Labs  Lab 11/22/17  0833 11/21/17  0857 11/20/17  0700 11/19/17  0629 11/18/17  0803  11/16/17  1714    142 141 137 136  < > 132*   POTASSIUM 3.3* 3.5 3.3* 3.5 3.4  < > 3.1*   CHLORIDE 113* 113* 112* 109 111*  < > 100   CO2 19* 19* 21 17* 14*  < > 24   ANIONGAP 9 10 8 10 11  < > 9   * 128* 132* 131* 137*  < > 184*   BUN 24 32* 36* 42* 45*  < > 57*   CR 3.62* 3.74* 3.75* 3.87* 3.82*  < > 4.27*   OKSANA 8.0* 7.9* 7.9* 8.4* 7.8*  < > 8.6   MAG  --   --   --   --  2.2  --  2.5*   PROTTOTAL  --   --   --   --  5.8*  --  7.0   ALBUMIN  --   --   --   --  1.8*  --  2.4*   BILITOTAL  --   --   --   --  0.2  --  0.4   ALKPHOS  --   --   --   --  38*  --  45   AST  --   --   --   --  27  --  24   ALT  --   --   --   --  18  --  19   < > = values in this interval not displayed.    Recent Labs  Lab 11/22/17  0833 11/21/17  0857 11/20/17  0700 11/19/17  0629   WBC 8.1 7.8 7.4 9.3   RBC 3.19* 3.50* 3.07* 3.16*   HGB 8.5* 9.3* 8.1* 8.4*   HCT 27.9* 30.1* 26.7* 27.6*   MCV 88 86 87 87   MCH 26.6 26.6 26.4* 26.6   MCHC 30.5* 30.9* 30.3* 30.4*   RDW 15.7* 15.7* 15.5* 15.3*    175 189 187       Recent Labs  Lab 11/16/17  1756   INR 1.20*       Recent Labs  Lab 11/22/17  0833 11/21/17  0857 11/20/17  0700 11/19/17  0629  11/17/17  0824   CRP 42.0* 63.0* 84.0* 120.0*  < > 120.0*   SED  --   --   --   --   --  92*   < > = values in this interval not displayed.    Physician Attestation   I, Jace Hernández, saw and evaluated Sandy Sheffield as part of a shared visit.  I have reviewed and discussed with the advanced practice provider their history, physical and plan.    I personally reviewed the vital signs, medications, labs and imaging.    My key history or physical exam findings: Patient seen and examined today.  Clinicaly stable, somnolent, but arrousable, a bit more orientated this morning.  Denies symptoms of HA, dizziness, CP, N/V.  Abdominal pain is stable.      PE:   VS: Afebrile and stable  GEN:  NAD  CV: RRR S1/S2, no m,r,g  Pulm: CTA b/l  Abd: soft, diffuse abdominal pain, no rebound, +BS  Ext: warm and well perfused, no edema    Key management decisions made by me: Awaiting PET-CT to help guide treatment of her acute delirium, possible graft infection.  Will determine duration of antibiotics, palliative care involvement, depending on findings.       Jace Hernández  Date of Service (when I saw the patient): 11/22/17

## 2017-11-22 NOTE — PROGRESS NOTES
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 Continue empiric vancomycin and piperacillin-tazobactam for now  2. Agree with PET/CT to assess whether indeed she has an aortic graft infection  3. Post-PET/CT plans to be determined based on findings and overall goals of treatment at that time; since there are no cultures to go off, any empiric antibiotics will be based on an educated guess       DISCUSSION:   86 yo female with a hx of T2DM, COPD, and 8.7cm AAA s/p EVAR on 10/7 with a post-op course c/b stroke. She presented on  with abdominal pain, necrotic great toes, and an SANDRO concerning for graft pathology. CT confirmed it and showed a large fluid collection with air suggestive of vascular graft infection. Vascular surgery was less impressed with the findings. The process is likely causing embolic phenomenon to her toes and possibly her kidneys. Overall prognosis of this condition is poor if this is truly infected, especially since she is a poor candidate for explantation. There is a plan to obtain a PET/CT to clarify if the images are c/w infection of the aortic graft as it will have a tremendous impact on prognosis. If it is infected, optimal therapy will be suppressive antibiotics. Currently, we do not have an organism to target (blood cultures are negative after 5 days) so will cover for MRSA and Pseudomonas given that it is an hospital acquired infection. So,  recommend vancomycin and piperacillin-tazobactam. Regarding the latter, per EMR she develops nausea/emesis with PCN and patient states that she gets a rash with PCN. Neither of those are overtly serious and we would like to use a PCN antibiotic for this infection to plan for long term oral outpatient therapy (would prefer not to use an oral cephalosporin outpatient). So, recommended using  piperacillin-tazobactam and if she develops an intolerance or a rash, switch to cefepime. To date she has tolerated the Zosyn.    ID will continue to follow.      NAT Russell M.D.  [facility] ID Service Staff  [phone]

## 2017-11-22 NOTE — PROGRESS NOTES
"Vascular Surgery Progress Note     Patient seen and examined this am. Resting this morning.     /68  Pulse 86  Temp 98  F (36.7  C) (Oral)  Resp 20  Ht 5' 8\"  Wt 196 lb 3.4 oz  SpO2 98%  BMI 29.83 kg/m2    Intake/Output Summary (Last 24 hours) at 11/22/17 1027  Last data filed at 11/22/17 0900   Gross per 24 hour   Intake             2155 ml   Output             2070 ml   Net               85 ml     General: Elderly  female resting supine in bed.   Cor: RRR  Pulm: Breathing is unlabored. Abd: Reducible incisional abdominal hernia. S/NT/ND  LE: Warm and well perfused. Focal darkened areas of necrosis overlying the right and left great toes.       WBC: 8.1  Blood cultures from 11/16 remain negative      Assessment: Mrs. Sheffield is an 84 yo female with history of HTN, HLD, COPD, Posterior circulation CVA, CAD and AAA s/p CHEVAR 10/07 admitted with positive UA and CT evidence concerning for graft infection.        Plan:       1. Discussed PET/CT with radiology. Will be performed today.   2. Vascular Surgery will follow       Dhaval Carcamo MD  Vascular Surgery Fellow  Pager: [phone]  "

## 2017-11-22 NOTE — PLAN OF CARE
Problem: Patient Care Overview  Goal: Plan of Care/Patient Progress Review  Outcome: No Change  AVSS. Patient oriented to self only. VPM and bed alarm on for safety. Son at the bedside and attentive to patient's needs. IV Dilaudid given for pain. Lidocaine patch was removed. Scheduled Zyprexa given. Patient agitated but easily redirectable. Mepilex on coccyx. Turned q2h. Rooke boots on. On a dysphagia level 1 diet with honey thick liquids. Patient tolerated a few spoonfuls.  Insulin coverage not needed. [place] intact. Continue with plan of care.

## 2017-11-22 NOTE — PLAN OF CARE
Problem: Patient Care Overview  Goal: Plan of Care/Patient Progress Review   De-sated to 87% on RA during the night, put on 2L NC.AVSS. Oriented to self and time (was able to identify the month) only. Denied any pain during shift. No c/o of nausea/vomiting. Son is at the bedside and active in all cares. [place] in place with adequate output. T&R Q2 hours. Patient is resistive, but can be redirected. Mepilex in place on coccyx. Rooke boots on. BG were 119 and 120 during the night, no insulin coverage was needed. R Midline infusing 75ml/hr. IV push Zosyn was given overnight. VPM and bed alarm in place. Will continue to monitor and plan of care.

## 2017-11-22 NOTE — PHARMACY-VANCOMYCIN DOSING SERVICE
Pharmacy Vancomycin Note  Date of Service 2017  Patient's  1932   85 year old, female    Indication: Possible AAA graft infection  Goal Trough Level: 15-20 mg/L  Day of Therapy: 6  Current Vancomycin regimen:  1000 mg IV once on  (intermittent dosing)    Current estimated CrCl = Estimated Creatinine Clearance: 13.3 mL/min (based on Cr of 3.62).    Creatinine for last 3 days  2017:  7:00 AM Creatinine 3.75 mg/dL  2017:  8:57 AM Creatinine 3.74 mg/dL  2017:  8:33 AM Creatinine 3.62 mg/dL    Recent Vancomycin Levels (past 3 days)  2017:  7:00 AM Vancomycin Level 15.6 mg/L  2017:  8:57 AM Vancomycin Level 22.0 mg/L  2017: 11:08 AM Vancomycin Level 28.3 mg/L    Vancomycin IV Administrations (past 72 hours)                   vancomycin (VANCOCIN) 1000 mg in dextrose 5% 200 mL PREMIX (mg) 1,000 mg New Bag 17 1227                Nephrotoxins and other renal medications (Future)    Start     Dose/Rate Route Frequency Ordered Stop    17 1530  vancomycin place suarez - receiving intermittent dosing      1 each Does not apply SEE ADMIN INSTRUCTIONS 17 1531      17 1500  piperacillin-tazobactam (ZOSYN) 2.25 g in 10 mL NS Premix Syringe      2.25 g  over 3 Minutes Intravenous EVERY 8 HOURS SCHEDULED 17 1458               Contrast Orders - past 72 hours     None          Interpretation of levels and current regimen:  Trough level is  Supratherapeutic    Has serum creatinine changed > 50% in last 72 hours: No    Urine output:  adequate    Renal Function: Stable    Plan:  1.  Will plan to hold vancomycin dose today and recheck level tomorrow AM. Patient appears to be accumulating as a decrease in dose yesterday resulted in higher levels (additionally, patient likely not at SS). Her half-life (using Dettli equation - (CrCl x 0.64123) + 0.0044) is approximately 45 hours but might be inaccurate given her unresolving SANDRO. A level tomorrow will  allow us to better calculate her clearance half life and we can re-dose appropriately.   2.  Pharmacy will check trough levels 11/23 w/ AM labs  3. Serum creatinine levels will be ordered daily for the first week of therapy and at least twice weekly for subsequent weeks.      Irene Ford, PharmD        .

## 2017-11-22 NOTE — PLAN OF CARE
Problem: Patient Care Overview  Goal: Plan of Care/Patient Progress Review  A&Ox2, pt confused, disoriented to place and situation. VPM and bed alarm in place. Pt unable to verbally communicate needs. AVSS, pt now on RA with O2 at 95%. Pt repositioned q2h, mepilex on coccyx is c/d/i. Pt resists repositioning, overhead lift used. Pt has prevalon boots on BLE, both feet with blackened scabs on digit 1. Pt tolerating scant amounts dysphagia 1 diet. Pt worked with PT, up to commode with walker, gait belt and 1 assist. [place] intact, good urine output. No BM, - flatus. Senna crushed and given with magic cup, pt tolerated ok. K+ 3.3, 30 mEq of the 40 given, still infusing.     Plan: PET scan at 1500. Continue POC.

## 2017-11-23 NOTE — PLAN OF CARE
"Problem: Patient Care Overview  Goal: Plan of Care/Patient Progress Review  /60 (BP Location: Left arm)  Pulse 71  Temp 98.3  F (36.8  C) (Axillary)  Resp 18  Ht 1.727 m (5' 8\")  Wt 89 kg (196 lb 3.4 oz)  SpO2 100%  BMI 29.83 kg/m2     AVSS on 2L O2 via oximask w/ sats in upper 90s. Pt oriented to self only, disoriented to place, time, situation. Speech illogical, garbled, tangential. Heavy assist of 2 to BSC. Pain managed w/ PRN Dilaudid. Nausea managed w/ PRN Zofran. Dysphagia 1 diet w/ honey-thickened liquids; minimal intake, pt refusing thickened liquids despite encouragement. Turned/repositioned q2h. Midline IV catheter infusing D5   ND + 20 mEq KCl @ 75mL/hr. LPIV SL.  [place] intact w/ marginal urine output. No BM this shift. Mepilex to coccyx wound CDI, due to be changed today. Black necrotic wounds to BL big toes, +2 pedal pulses, prevalon boots to BL feet. Son at bedside. Video patient monitoring on. Will continue to monitor and follow POC.      "

## 2017-11-23 NOTE — PHARMACY-VANCOMYCIN DOSING SERVICE
Pharmacy Vancomycin Note  Date of Service 2017  Patient's  1932   85 year old, female    Indication: possible AAA graft infection  Goal Trough Level: 15-20 mg/L  Day of Therapy: 7  Current Vancomycin regimen:  Intermittent dosing, last received 1000mg  12:27pm    Current estimated CrCl = Estimated Creatinine Clearance: 13.9 mL/min (based on Cr of 3.46).    Creatinine for last 3 days  2017:  8:57 AM Creatinine 3.74 mg/dL  2017:  8:33 AM Creatinine 3.62 mg/dL  2017:  7:47 AM Creatinine 3.46 mg/dL    Recent Vancomycin Levels (past 3 days)  2017:  8:57 AM Vancomycin Level 22.0 mg/L  2017: 11:08 AM Vancomycin Level 28.3 mg/L  2017:  7:47 AM Vancomycin Level 25.3 mg/L    Vancomycin IV Administrations (past 72 hours)                   vancomycin (VANCOCIN) 1000 mg in dextrose 5% 200 mL PREMIX (mg) 1,000 mg New Bag 17 1227                Nephrotoxins and other renal medications (Future)    Start     Dose/Rate Route Frequency Ordered Stop    17 1500  piperacillin-tazobactam (ZOSYN) 2.25 g in 10 mL NS Premix Syringe      2.25 g  over 3 Minutes Intravenous EVERY 8 HOURS SCHEDULED 17 1458               Contrast Orders - past 72 hours (72h ago through future)    Start     Dose/Rate Route Frequency Ordered Stop    17 1515  fluorodeoxyglucose F-18 (FDG) radioisotope injection 3-18 mCi      3-18 mCi Intravenous ONCE 17 1506 17 1541    17 1515  iohexol (OMNIPAQUE) solution 50 mL  Status:  Discontinued      50 mL Oral ONCE 17 1506 17 1542          Interpretation of levels and current regimen:  Trough level is  Supratherapeutic    Has serum creatinine changed > 50% in last 72 hours: No    Urine output:  good urine output    Renal Function: Stable    Plan:  1.  vancomycin disctontinued by primary team.      Tr Garcia

## 2017-11-23 NOTE — PLAN OF CARE
"Problem: Confusion, Acute (Adult)  Goal: Identify Related Risk Factors and Signs and Symptoms  Related risk factors and signs and symptoms are identified upon initiation of Human Response Clinical Practice Guideline (CPG).   Outcome: No Change  Blood pressure 112/63, pulse 84, temperature 98.4  F (36.9  C), temperature source Axillary, resp. rate 18, height 1.727 m (5' 8\"), weight 89 kg (196 lb 3.4 oz), SpO2 98 %.    Patient unable to report pain, most of the morning, pt is sleeping and resting in bed, easily arouseable. Turned and repositioned every 2 hours. Last turne at around 2pm. Coccyx, blanchable, mepilex dressing applied. Tolerating dysphagia level 1/pureed honey thickened liquid diet. Total feed and poor appetite. Patient unable to reports passing flatus d/t cognitive status, no BM this shift. [place] catheter patent and intact, placed d/t retention, with good urine volume. Assist of 2. Using prevalon boots while in bed, necrotic toes, pulses normal both feet. MIVF at 75 cc.hr via midline right arm, left PIV-SL. On VPM, please coordinate with the team tomorrow on discharge planning, pt needs to off VPM for 24 hours if discharge to rehab. OVSS, BS every 4 hours, no SSI given. Needs to have O2 at 2 lpm via face mask when asleep to keep O2 mid 90's. Critical value for Vanco this am, please see other note. Pt/family would like to have bedside report at shift change. PLAN: To continue with the care plan.        "

## 2017-11-23 NOTE — PROGRESS NOTES
[facility], [place]  Internal Medicine Progress Note - Gold Service      Assessment & Plan   Sandy Sheffield is a 85 year old female admitted on 11/16/2017. She has a history of HTN, DM type II, CKD, CAD, COPD, lupus, and AAA s/p endovascular repair (10/7/2017) c/b CVA and is admitted for suspected infection of her aortic graft and SANDRO. Hospital stay c/b acute delirium.     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 Casillas. Presented 11/16 with increased abdominal pain, weight loss, fatigue, and necrotic lesions on toes. Obtained CT abd/pelvis which revealed large fluid collection surrounding endovascular graft with multiple foci of intraluminal air concerning for infected graft vs thrombus; no evidence of hemorrhage to suggest aortic leak. Vascular surgery consulted - patient not a good candidate for explantation, recommend conservative tx. ID consulted. Blood cultures 11/16 NGTD, which is unusual for graft infection. Afebrile. CRP trending towards normal on IV Vanco and IV Zosyn.  No evidence of sepsis. Case discussed with vascular surgery and ID 11/20. Per vascular surgery, CT findings not very impressive - raising question of whether there is active infection. Repeat procalcitonin only 0.24. Per ID, tagged WBC study with poor specificity, recommending PET-CT to further evaluate.   - PET-CT completed 11/22, awaiting formal read  - D/C Vanco today given negative cx. Also with supratherapeutic levels.   - Continue IV Zosyn  - Pain:  IV dilaudid prn, use sparingly  - Repeat CBC, BMP in AM. CRP q Mth unless febrile.   - Palliative consulted. Family considering palliative/hospice if graft infected and little chance for meaningful recovery. If equivocal findings on PET-CT, then will need to discuss possibility of completing a 7-14  day course of abx with ID vs stopping abx altogether.  I would be interested in planning a care conference early next week, appreciate SW assistance.      Acute metabolic encephalopathy with delirium - Acute confusion noted during recent hospitalization for AAA repair and CVA. Gradual improvement by the time of discharge with further improvement at TCU. Daughter notes worsening confusion upon transfer to assisted living. Patient with suspected metabolic encephalopathy in setting of infection with acute hospital induced delirium. No electrolyte abnormalities. TSH elevated but free T4 normal. B12 elevated. No other clear source of infection, therefore if graft infection ruled out then likely hospital induced encephalopathy/delirium. Discussed with family who feel that current mental status not felt to be quality lifestyle. Exploring possible hospice regardless of infection. Agitation appears to be improved on scheduled zyprexa.  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 Concern for thromboembolic event in setting of recent endovascular repair. Both toes with dark discoloration/eschar of tips, stable. Sensation intact in distal extremities, + pedal pulses.  - Doppler US q6h to ensure patent blood flow  - Vascular surgery following       SANDRO on CKD - Creatinine elevated at 4.27 on admission. Initial improvement w IVF, now stable at . Baseline Cr of approx 2.2-2.4. Left renal stent placed on 10/7. Renal US on admission with patent blood flow to both kidneys. Likely SANDRO multifactorial in setting of hypovolemia . UOP improved. Per discussion w family, would not be interested in HD if declining.  - Cont IV D5 1/2 NS + 20 mEq KCl @ 75  cc/hour  - I&O, daily weights  - Renally dose medications  - Avoid nephrotoxic agents as able  - Trend BMP      Dysphagia with possible acute aspiration event - Concern for possible aspiration event overnight 11/17. CXR revealed increased moderate right pleural effusion with overlying atelectasis or infection. Afebrile. WBC normal. Vitals stable. SLP consulted. Currently stable on DD1 diet. Family notes increased cough today. Weaned off supplemental O2. On broad spectrum abx which would adequately cover CAP and aspiration pneumonia. Given improving CRP, WBC, and stable O2 sats on room air, PNA felt to be less likely.   - Cont DD1 diet  - SLP following     Recent posterior circulation CVA - MRI brain 10/11 following AAA repair revealed abnormal diffusion restriction in left > right occipital lobes and bilateral inferior cerebellar hemispheres c/w acute infarct. Felt possibly related to endovascular procedure. Cardioembolic source felt to be less likely. Repeat echo 11/16 negative for vegetations. Started on ASA and high-dose statin for stroke prophylaxis. Patient with some residual cognitive deficits.   - Cont PT/OT/SLP  - ASA and statin on hold d/t dysphagia      Normocytic Anemia - Hgb 8.1 (8.2), BL ~10-11 although limited data. No acute s/s of bleeding since admission. Likely anemia in setting of recent surgery, poor nutrition.  - Trend CBC      Hypokalemia - Likely d/t poor PO intake. K 3.7 today.   - Replacement protocol  - Trend BMP     Chronic Medical Problems:  Lupus. No records on file. No PTA medications. Elevated ESR and CRP on admission felt to be d/t infectious source. Nothing clearly pointing to lupus at this time. Would consider further evaluation if infectious work up negative.   DM2. A1C 6.7% this admission. Diet controlled. Hyperglycemic on admission. Now well controlled on medium intensity SSI.   COPD. No wheezing on exam. Cont albuterol PRN.   CAD.  ASA on hold d/t aspiration risk.     Diet:  "Dysphagia Diet Level 1 Pureed Honey Thickened Liquids (pre-thickened or use instant food thickener) (Use spoon only - no cup or straw)  Room Service  Snacks/Supplements Adult: Magic Cup; Between Meals  Calorie Counts  Fluids: D50.45NS at 75mL/hour   DVT Prophylaxis: Pneumatic Compression Devices  Code Status: DNR/DNI    Disposition Plan   Expected discharge: 4 - 7 days, recommended to unknown at this time once antibiotic plan established and safe disposition plan/ TCU bed available.     Entered: Marguerite Nunez 11/23/2017, 8:20 AM   Information in the above section will display in the discharge planner report.      The patient's care was discussed with the Attending Physician, Dr. Hernández, Bedside Nurse, Patient and Patient's Family.    Marguerite Nunez, [facility]  Internal Medicine Staff Hospitalist Service  [facility]  Pager: [phone]  Please see sticky note for cross cover information    Interval History   Patient is not able to participate in a full ROS.  States she has no pain currently, however, she did have pain on palpation.  States she wants to sleep.  Continues to be confused but not as agitated as prior days.      Lengthy discussion held with daughter, Mary, today regarding the outlined POC above and she is in agreement with this plan.  The plan will be to wait for the final read on the PET CT scan and then try and have a care conference on Monday.     Data reviewed today: I reviewed all medications, new labs and imaging results over the last 24 hours. I personally reviewed no images or EKG's today.    Physical Exam   Vital Signs: Temp: 98  F (36.7  C) Temp src: Axillary BP: 127/90 Pulse: 71 Heart Rate: 87 Resp: 18 SpO2: 100 % O2 Device: Nasal cannula with humidification Oxygen Delivery: 2 LPM  Weight: 196 lbs 3.35 oz    GENERAL: Alert and oriented x 1. States she is in a \"brick building\" and that it is \"2004\".  Opened eyes transiently to loud voice. Appears comfortable.   HEENT: " Anicteric sclera. Mucous membranes moist.   CV: RRR. S1, S2. No murmurs appreciated.   RESPIRATORY: Effort normal on room face mask.  Diminished BS in bases bilaterally.    GI: Abdomen soft and non distended, bowel sounds present. Tender to light palpation in mid abdomen.  No rebound, guarding or s/s of peritonitis.   MUSCULOSKELETAL: No joint swelling or tenderness.   NEUROLOGICAL: No focal deficits. Moves all extremities.   EXTREMITIES: No peripheral edema. Intact bilateral pedal pulses.   SKIN: No jaundice. No rashes.       Data   Medications     dextrose 5% and 0.45% NaCl + KCl 20 mEq/L 75 mL/hr at 11/23/17 1319       sodium chloride (PF)  10 mL Intracatheter Q8H     heparin lock flush  5-10 mL Intracatheter Q24H     OLANZapine zydis  5 mg Oral At Bedtime     melatonin  3 mg Oral At Bedtime     lidocaine  1 patch Transdermal Q24h    And     lidocaine   Transdermal Q24H    And     lidocaine   Transdermal Q8H     sodium chloride (PF)  10 mL Intracatheter Q8H     sodium chloride (PF)  10 mL Intracatheter Q8H     sodium chloride (PF)  10 mL Intracatheter Q8H     pneumococcal vaccine  0.5 mL Intramuscular Prior to discharge     piperacillin-tazobactam  2.25 g Intravenous Q8H MARY     insulin aspart  1-6 Units Subcutaneous Q4H     sodium chloride (PF)  3 mL Intracatheter Q8H     Data     Recent Labs  Lab 11/23/17  0747 11/22/17  0833 11/21/17  0857  11/18/17  0803  11/16/17  1756 11/16/17  1714   WBC 7.8 8.1 7.8  < > 8.7  < >  --  11.3*   HGB 8.1* 8.5* 9.3*  < > 8.2*  < >  --  9.7*   MCV 87 88 86  < > 93  < >  --  86    171 175  < > 174  < >  --  218   INR  --   --   --   --   --   --  1.20*  --     141 142  < > 136  < >  --  132*   POTASSIUM 3.7 3.3* 3.5  < > 3.4  < >  --  3.1*   CHLORIDE 116* 113* 113*  < > 111*  < >  --  100   CO2 17* 19* 19*  < > 14*  < >  --  24   BUN 22 24 32*  < > 45*  < >  --  57*   CR 3.46* 3.62* 3.74*  < > 3.82*  < >  --  4.27*   ANIONGAP 8 9 10  < > 11  < >  --  9   OKSANA 7.9*  8.0* 7.9*  < > 7.8*  < >  --  8.6   * 122* 128*  < > 137*  < >  --  184*   ALBUMIN  --   --   --   --  1.8*  --   --  2.4*   PROTTOTAL  --   --   --   --  5.8*  --   --  7.0   BILITOTAL  --   --   --   --  0.2  --   --  0.4   ALKPHOS  --   --   --   --  38*  --   --  45   ALT  --   --   --   --  18  --   --  19   AST  --   --   --   --  27  --   --  24   LIPASE  --   --   --   --   --   --   --  635*   < > = values in this interval not displayed.  No results found for this or any previous visit (from the past 24 hour(s)).    Physician Attestation   I, Jace Hernández, have reviewed and discussed with the advanced practice provider their history, physical and plan for Sandy JATINDER Sheffield. I did not participate in a shared visit by interviewing or examining the patient and this should be billed as an advanced practice provider only visit.    Jace Hernández  Date of Service (when I saw the patient): I did not personally see this patient today.

## 2017-11-23 NOTE — PLAN OF CARE
Problem: Patient Care Overview  Goal: Plan of Care/Patient Progress Review  Discharge Planner SLP   Patient plan for discharge: not stated  Current status: Pt seen for swallow tx- pt remains confused/fatigued. Pt continues with positive overt s/s of aspirtion with limited trials of nectar thick liquids by teaspoon -- including significant drop in O2 sats on 2/2 trials and continued cough, wet gurgly vocal quality; no aspiration s/s on honey thick liquids by teaspoon; pt with only limited cooperation with pureed trials- spitting out applesauce- but based on limited trials- no aspiration s/s. Pt refused further trials. Pt remains at a moderately high risk of aspiration. Recommend continue with current DD1 with honey thick liquids by teaspoon/ no straws, upright and alert for all po- neds 1:1 supervision/ assist, small bites/sips, alternate solids and liquids, pace self- eat slowly. SLP to f/u for diet tolerance; readiness for further diet advancement.  Barriers to return to prior living situation:confusion; dysphagia     Recommendations for discharge: TCU  Rationale for recommendations: swallowing is below baseline       Entered by: Joelle Reece 11/23/2017 1:42 PM

## 2017-11-23 NOTE — PROGRESS NOTES
Calorie Counts  Intake recorded for: 11/22  Kcals: 350  Protein: 10g  # Meals Recorded: 2 meals (First - less than 25% pureed waffle, banana, pureed sausage, cranberry juice)      (Second - less than 25% cranberry juice, potato sup, applesauce)  # Supplements Recorded: about 50% 1 Magic Cup

## 2017-11-23 NOTE — PROVIDER NOTIFICATION
CRITICAL VALUE    Call received from lab re: critical value, vanco level 25.3. This RN notified verbally both the primary and RPh.    Orders not obtained. Per RPh will make necessary changes to vanco dose as needed.

## 2017-11-23 NOTE — PLAN OF CARE
Problem: Patient Care Overview  Goal: Plan of Care/Patient Progress Review  Outcome: No Change  Pt disoriented to place, time, situation. Sating 95 on 2L nasal cannula. Came back from PET scan at 1800. Trigged sepsis protocol for R22 and . Lactic 0.8. VS now stable. Dilaudid IV given x1. Turned q2h. Black necrotic wounds to bilateral toes, prevelon boots on. +2 pedal pulses. Coccyx wound has Mepilex on, due to be changed tomorrow. [place] has marginal urine output, continue to monitor output and BP. On thick liquids diet, patient only took bites of food. Up to commode once, medium BM + gas. 4/4 K+ replaced, recheck due in AM. Midline catheter at 75/hr. Meds given with Magic cup or pudding. Son at bedside. Cont POC.

## 2017-11-23 NOTE — PROGRESS NOTES
"Vascular Surgery Progress Note     Patient seen and evaluated at the bedside. No overnight events. Confused this AM. Complains of pain in toes. Son at bedside.     /90 (BP Location: Left arm)  Pulse 71  Temp 98  F (36.7  C) (Axillary)  Resp 18  Ht 5' 8\"  Wt 196 lb 3.4 oz  SpO2 100%  BMI 29.83 kg/m2    General: Elderly  female resting supine in bed.   Cor: RRR  Pulm: Breathing is unlabored. Abd: Reducible incisional abdominal hernia. S/NT/ND  LE: Warm and well perfused. Focal darkened areas of necrosis overlying the right and left great toes. Pulses are palpable bilaterally.       WBC: 8.5 yesterday (Pending today)  Blood cultures from 11/16 remain negative      Assessment: Mrs. Sheffield is an 84 yo female with history of HTN, HLD, COPD, Posterior circulation CVA, CAD and AAA s/p CHEVAR 10/07 admitted with positive UA and CT evidence concerning for graft infection.  Urine and blood cultures negative.       Plan:       1. PET/CT reviewed. Again not overtly impressive for graft infection. Marginal increased FDG signal appreciated at the proximal aspect of the graft.  Would follow up official radiology read. Continue IV antibiotics until read is posted.   2. Would continue nutrition supplementation  3. Vascular Surgery will follow       Dhaval Carcamo MD  Vascular Surgery Fellow  Pager: [phone]  "

## 2017-11-24 PROBLEM — T82.7XXA: Status: ACTIVE | Noted: 2017-01-01

## 2017-11-24 NOTE — PROGRESS NOTES
Social Work: Assessment with Discharge Plan    Patient Name:  Sandy Sheffield  :  1932  Age:  85 year old  MRN:  [id number]  Risk/Complexity Score:     Completed assessment with:  Angy Hernandez    Presenting Information   Reason for Referral:  Discharge plan, Potential need for community services upon discharge and Hospice  Date of Intake:  2017  Referral Source:  Physician, Family and Chart Review  Decision Maker: Per NOK, Pt's adult children  Alternate Decision Maker:  None identified  Health Care Directive:  Invalid directive per electronic record  Living Situation:  Adult foster home  Previous Functional Status:  Pt had assist with ADL's at baseline.  Patient and family understanding of hospitalization:  Family demonstrates good understanding.  Cultural/Language/Spiritual Considerations:  Presybeterian, Jehovah's witness per family report.  Adjustment to Illness:  Unable to assess. Family appears to be coping well, and were understanding of Pt's prognosis and life expectancy.    Physical Health  Reason for Admission:  No diagnosis found.  Services Needed/Recommended:  Hospice    Mental Health/Chemical Dependency  Diagnosis:  None  Support/Services in Place:  n/a  Services Needed/Recommended:  n/a    Support System  Significant relationship at present time:  Yes, Pt's children are involved and supportive.  Family of origin is available for support:  Yes  Other support available:  Yes  Gaps in support system:  No  Patient is caregiver to:  None     Provider Information   Primary Care Physician:  Mo Louis   [phone]   Clinic:  [facility] [address] / [place] *      :  Unknown    Financial   Income Source:  SSI  Financial Concerns:  On Medical Assistance  Insurance:    Payor/Plan Subscriber Name Rel Member # Group #   MEDICARE - MEDICARE P* SANDY SHEFFIELD  [id number]       ATTN CLAIMS, [address]   BCBS - [employer] EM[employer]* SANDY SHEFFIELD JATINDER  [id number]      [address] [address]       Discharge Plan   Patient and family discharge goal:  JIA met with Pt's dtr Mary today to discuss d/c planning. Mary and family are hoping that Pt will be able to d/c to hospice home. SW explained options and family would like to pursue placement at [facility] if there is availability.    SW spoke with Diann at [facility] and she indicated that there may be available beds next week. Per medical team, Pt likely ready to d/c from hospital on Monday.     JIA spoke with STEPHIE Everett who filled out required ppwk for [facility] hospice. SW faxed entire referral today; awaiting confirmation if they can accommodate Pt on Monday.  Provided education on discharge plan:  YES  General information regarding anticipated insurance coverage and possible out of pocket cost was discussed. Patient and patient's family are aware patient may incur the cost of transportation to the facility, pending insurance payment: YES  Barriers to discharge:  Medical stability, placement, coordination of hospice services    Discharge Recommendations   Anticipated Disposition:  TBD - hopefully [facility]  Transportation Needs:  Medical:  Stretcher  Name of Transportation Company and Phone:  [employer] (Ph: [phone])    Additional comments   JIA met with Pt's dtr today following discussion with STEPHIE Everett. Medical team recommending hospice at d/c. Pt's family is in agreement with this recommendation, and are hoping to transition Pt to a hospice home.    JIA discussed finances re: hospice homes. They are unable to afford the cost of room and board. SW explained that [facility] hospice home does not charge for room and board for low income Pt's. They would like to pursue placement at [facility].     SW made referral today to Diann @ [facility]; awaiting confirmation if they can accept Pt on Monday. SW to continue to follow and assist with safe d/c planning.    Felecia Delgadillo, MSW, LGSW   Surgical Oncology Unit    [phone]  Pager: [phone]

## 2017-11-24 NOTE — PROVIDER NOTIFICATION
MD paged re: increase in pt agitation/restlessness, pt is waving arms at caretakers, only oriented to self, refusing any cares and refusing to take po prn zyprexa.     MD placed IM zyprexa order.

## 2017-11-24 NOTE — PLAN OF CARE
Problem: Patient Care Overview  Goal: Plan of Care/Patient Progress Review  Outcome: No Change  Pt disoriented, confused x4. VSS. Pt needs 2LPM to keep sats mid 90s. Pt at times unable to verbalize pain. C/o of abd pain and back pain, Dilaudid given x2 this shift, see MAR. Turned q2h, Last turned 2230. Pt had 2 smear BMs. Mepilex to coccyx changed today on days, c/d/i. Bilateral necrotic toe wounds. Prevelon boots in place. Total feed and poor appetite, few bits of cranberry thickened juice and magic cup. Diet is honey thick liquids. BGs checked q4, no SS insulin given. VPM DCd and sitter at beside d/t pt pulling out macario and picking at IV lines. Macario had good output until pulled out. PVR of 260. Straight cath if PVR is >350 per orders. Will continue to monitor and replace Macario if necessary. Pt needs to off VPM for 24 hours if discharge to rehab. Cont to monitor confusion and output. Cont POC.

## 2017-11-24 NOTE — PROGRESS NOTES
Calorie Counts    Intake recorded for: 11/23 Kcals: 88 Protein: 2g    # of meals recorded: less than 25% magic cup, mashed potatoes, cranberry juice    # of supplements recorded: 0

## 2017-11-24 NOTE — PROVIDER NOTIFICATION
MD paged re: bladder scan volume 750. Pt expressing severe distress and discomfort and unable to void despite several attempts. Resident also informed of hx of retention, asked for [place] indwelling order.     MD placed order for [place] indwelling catheter.

## 2017-11-24 NOTE — PLAN OF CARE
Problem: Patient Care Overview  Goal: Plan of Care/Patient Progress Review    SLP 7C: Pt not appropriate for safe participation in skilled PO trials/dysphagia tx due to somnolence at attempt. Will follow per POC as appropriate. Per review of chart, family considering comfort measures with possible d/c to hospice, but plan to re-address this on Monday.

## 2017-11-24 NOTE — PLAN OF CARE
Problem: Patient Care Overview  Goal: Plan of Care/Patient Progress Review  OT 7C: continue holding, per chart review pt is still agitated and confused, would likely be unable to follow commands or retain new information provided by therapy. Will reschedule and check in to determine if pt is appropriate for IP OT.

## 2017-11-24 NOTE — PLAN OF CARE
"Problem: PT General Care Plan  Goal: PT target date for goal attainment  PT: attempted therapy this afternoon and patient's family member declined and stated \" no this is not the time.\" nursing attempted to encourage patient and family and family and patient continued to decline. Will cancel session.       "

## 2017-11-24 NOTE — PROGRESS NOTES
"Vascular Surgery Progress Note     Patient seen and evaluated at the bedside this morning. No acute distress. Hallucinations overnight per RN. Agitation paranoia seem to have resolved this morning.     /90 (BP Location: Left arm)  Pulse 84  Temp 95.9  F (35.5  C) (Axillary)  Resp 20  Ht 5' 8\"  Wt 196 lb 3.4 oz  SpO2 98%  BMI 29.83 kg/m2  Continues to be afebrile.     Intake/Output Summary (Last 24 hours) at 11/24/17 1017  Last data filed at 11/24/17 0753   Gross per 24 hour   Intake             1820 ml   Output             1725 ml   Net               95 ml     General: Elderly  female resting supine in bed on 4L O2  Cor: RRR  Pulm: Breathing is unlabored.   Abd: Reducible incisional abdominal hernia. S/NT/ND  LE: Warm and well perfused. Focal darkened areas of necrosis overlying the right and left great toes. Pulses are palpable bilaterally.       WBC: 9.3  Blood cultures from 11/16 remain negative      Assessment: Mrs. Sheffield is an 86 yo female with history of HTN, HLD, COPD, Posterior circulation CVA, CAD and AAA s/p CHEVAR 10/07 admitted with positive UA and CT evidence concerning for graft infection.  Urine and blood cultures negative.       Plan:     1. Reviewed images with radiology this morning.  Asymmetrical Nodular FDG uptake at the proximal left aspect of the graft is Likely consistent with infection. Nodularity is appreciated along the aneurysmal sac wall itself to a greater extent than is realized in the graft...but proximally there is an area of increased signal intensity around the stent that is concerning.  Patient will need lifelong antibiotic therapy. Coverage of S.aureus and S. epidermidis recommended. Will defer to ID.  2. Nutrition Consultation and care appreciated.     Dhaval Carcamo MD   Vascular Surgery Fellow  Pager: [phone]  "

## 2017-11-24 NOTE — PLAN OF CARE
Problem: Patient Care Overview  Goal: Plan of Care/Patient Progress Review  In bed most of the shift, poor appetite, total feed. PIV left FA, infiltrated with Jaz Oquendo, vascular RN and Marguerite Nunez, STEPHIE aware of it. Cold compress, applied with some improvement. MIVF at 75 cc/hr via midline catheter with good blood return. Coccyx, non blanchable, had a small BM this shift, mepilex intact. Bilateral big toes with necrotic site, CDI. Attendant for safety. Ireland in place with good urine volume. OVSS, afebrile. O2 at 2lpm while asleep to keep O2 sats above 2lpm via. Pt started to be agitated around 1430H, prn zyprexia given with some relief. PLAN: Per NP will discuss with the attending. NP had a family conference with the daughter and already discussed the results of PET scan. Per Dtr, may have the bedside report depending on pt's mental status to avoid further agitation to pt.

## 2017-11-24 NOTE — PROGRESS NOTES
Clinical Nutrition Services- Brief Note    Pt continues on dysphagia level 1 diet and honey thickened liquids.    Calorie counts poor: 3 day average 181kcals and 5g protein.    Per notes family waiting on final read of PET CT scan to determine plan with possible care conference on Monday.    RECOMMENDATIONS  -Pt with very poor oral intake for 8 days this admit and likely PTA. Pending GOC recommend starting nutrition support to meet nutritional needs. Ideally enteral nutrition appropriate as GIT functioning. If unable to get feeding tube access could consider parenteral nutrition.     Please refer to nutrition note from 11/20/17 for full nutritional assessment.    The above note was completed by Clau Carvajal RD, LD Pager [phone]

## 2017-11-24 NOTE — PROGRESS NOTES
[facility], [place]  Internal Medicine Progress Note - Gold Service      Assessment & Plan   Sandy Sheffield is a 85 year old female admitted on 11/16/2017. She has a history of HTN, DM type II, CKD, CAD, COPD, lupus, and AAA s/p endovascular repair (10/7/2017) c/b CVA and is admitted for suspected infection of her aortic graft and SANDRO. Hospital stay c/b acute delirium. PET-CT on 11/22 confirmed likely infection of her graft.      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 Casillas. Presented 11/16 with increased abdominal pain, weight loss, fatigue, and necrotic lesions on toes.  CT A/P with concern for infected graft, no e/o leak.  Vascular surgery consulted and patient felt not to be a good candidate for explantation, recommended conservative treatment.  ID consulted and following: has been maintained on IV Vanco and IV Zosyn.  Inflammatory markers improving, however, patient has not clinically improved.  PET-CT completed on 11/22 with final read today which findings consistent with infected graft.  Family updated today in conjunction with palliative care.  Discussed findings of PET scan and need for lifelong antibiotics.  The family is interested in transitioning care to comfort measures with discharge to hospice, however, she needs to discuss with the rest of the family. Vanco D/Cd 11/24 for supratherapeutic levels.   - Continue IV Zosyn until we hear back from family on goals of care  - Pain:  IV dilaudid prn, use sparingly  - Limit lab draws unless significant clinical change   - Appreciate ongoing input from palliative care.  Once the family informs me of their decision, we will discuss next steps with the family and Palliative  - Social work consulted again today to start process for hospice discharge, appreciate ongoing  assistance.      Acute metabolic encephalopathy with delirium - Acute confusion noted during recent hospitalization for AAA repair and CVA. Gradual improvement by the time of discharge with further improvement at TCU. Daughter notes worsening confusion upon transfer to assisted living. Patient with suspected metabolic encephalopathy in setting of infection with acute hospital induced delirium. No electrolyte abnormalities. TSH elevated but free T4 normal. B12 elevated. No other clear source of infection, therefore if graft infection ruled out then likely hospital induced encephalopathy/delirium. Discussed with family who feel that current mental status not felt to be quality lifestyle. Exploring possible hospice. Agitation appears to be improved on scheduled zyprexa.  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 Concern for thromboembolic event in setting of recent endovascular repair. Both toes with dark discoloration/eschar of tips, stable. Sensation intact in distal extremities, + pedal pulses.  - Doppler US q6h to ensure patent blood flow  - Vascular surgery following   - Pain control as above      SANDRO on CKD - Creatinine elevated at 4.27 on admission. Initial improvement w IVF, now stable at . Baseline Cr of approx 2.2-2.4. Left renal stent placed on 10/7. Renal US on admission with patent blood flow to both kidneys. Likely SANDRO multifactorial in setting of hypovolemia . UOP improved. Per discussion w family, would not be interested in HD if declining.  - Cont IV D5 1/2 NS + 20 mEq KCl @ 75 cc/hour  - I&O, daily weights  - Renally dose medications  - Avoid nephrotoxic agents as able      Dysphagia with possible acute aspiration  event - Concern for possible aspiration event overnight 11/17. CXR revealed increased moderate right pleural effusion with overlying atelectasis or infection. Afebrile. WBC normal. Vitals stable. SLP consulted. Currently stable on DD1 diet. Ongoing titration of supplemental 02. On broad spectrum abx which would adequately cover CAP and aspiration pneumonia. Given improving CRP, WBC, and stable O2 sats on room air, PNA felt to be less likely.  Family has declined feeding tube.   - Cont DD1 diet  - SLP following     Recent posterior circulation CVA - MRI brain 10/11 following AAA repair revealed abnormal diffusion restriction in left > right occipital lobes and bilateral inferior cerebellar hemispheres c/w acute infarct. Felt possibly related to endovascular procedure. Cardioembolic source felt to be less likely. Repeat echo 11/16 negative for vegetations. Started on ASA and high-dose statin for stroke prophylaxis. Patient with some residual cognitive deficits.   - Cont PT/OT/SLP  - ASA and statin on hold d/t dysphagia      Normocytic Anemia - Hgb 8.3 (8.1), BL ~10-11 although limited data. No acute s/s of bleeding since admission. Likely anemia in setting of recent surgery, poor nutrition.      Hypokalemia - Likely d/t poor PO intake. K 3.56 today.  Maintained on replacement protocol.      Chronic Medical Problems:  Lupus. No records on file. No PTA medications. Elevated ESR and CRP on admission felt to be d/t infectious source. Nothing clearly pointing to lupus at this time. Would consider further evaluation if infectious work up negative.   DM2. A1C 6.7% this admission. Diet controlled. Hyperglycemic on admission. Now well controlled on medium intensity SSI.   COPD. No wheezing on exam. Cont albuterol PRN.   CAD.  ASA on hold d/t aspiration risk.     Diet: Dysphagia Diet Level 1 Pureed Honey Thickened Liquids (pre-thickened or use instant food thickener) (Use spoon only - no cup or straw)  Room  Service  Snacks/Supplements Adult: Magic Cup; Between Meals  Fluids: D50.45NS at 75mL/hour   DVT Prophylaxis: Pneumatic Compression Devices  Code Status: DNR/DNI    Disposition Plan   Expected discharge: 2 - 3 days, recommended to likely hospice facility pending family's decision once pending family discussion. .     Entered: Marguerite Nunez 11/24/2017, 8:12 AM   Information in the above section will display in the discharge planner report.      The patient's care was discussed with the Attending Physician, Dr. Hernández, Bedside Nurse, Care Coordinator/, Patient, Patient's Family, Palliative Consultant and Vascular Team.    Marguerite Nunez, [facility]  Internal Medicine Staff Hospitalist Service  [facility]  Pager: [phone]  Please see sticky note for cross cover information    Interval History   Patient is not able to participate in a full ROS.  Somnolent today but per nursing notes and daughter report, did have some agitation last afternoon/evening around 3 pm.  Retained urine last night requiring straight catheterization x 1.     Family meeting held today with daughter and Palliative care to discuss findings on PET scan.  Currently the daughter, Mary, is communicating with the rest of the family to discuss the discontinuation of antibiotics and transition to comfort care.      Data reviewed today: I reviewed all medications, new labs and imaging results over the last 24 hours. I personally reviewed the PET scan image(s) showing likely graft infection.    Physical Exam   Vital Signs: Temp: 95.9  F (35.5  C) Temp src: Axillary BP: 127/90 Pulse: 84 Heart Rate: 89 Resp: 20 SpO2: 98 % O2 Device: Oxymask Oxygen Delivery: 2 LPM  Weight: 196 lbs 3.35 oz    GENERAL: Alert and oriented x 1.  Opened eyes intermittently to loud voice. Appears comfortable.   HEENT: Anicteric sclera. Mucous membranes moist.   CV: RRR. S1, S2. No murmurs appreciated.   RESPIRATORY: Effort normal on 2L Oxymask.   Diminished BS in bases bilaterally.    GI: Abdomen soft and non distended, bowel sounds present. Tender to light palpation in mid abdomen.  No rebound, guarding or s/s of peritonitis.   MUSCULOSKELETAL: No joint swelling or tenderness.   NEUROLOGICAL: No focal deficits. Moves all extremities.   EXTREMITIES: No peripheral edema. Intact bilateral pedal pulses.   SKIN: No jaundice. No rashes.       Data   Medications     dextrose 5% and 0.45% NaCl + KCl 20 mEq/L 75 mL/hr at 11/24/17 0659       sodium chloride (PF)  10 mL Intracatheter Q8H     heparin lock flush  5-10 mL Intracatheter Q24H     OLANZapine zydis  5 mg Oral At Bedtime     melatonin  3 mg Oral At Bedtime     lidocaine  1 patch Transdermal Q24h    And     lidocaine   Transdermal Q24H    And     lidocaine   Transdermal Q8H     sodium chloride (PF)  10 mL Intracatheter Q8H     sodium chloride (PF)  10 mL Intracatheter Q8H     sodium chloride (PF)  10 mL Intracatheter Q8H     pneumococcal vaccine  0.5 mL Intramuscular Prior to discharge     piperacillin-tazobactam  2.25 g Intravenous Q8H MARY     insulin aspart  1-6 Units Subcutaneous Q4H     sodium chloride (PF)  3 mL Intracatheter Q8H     Data     Recent Labs  Lab 11/24/17  0742 11/23/17  0747 11/22/17  0833  11/18/17  0803   WBC 9.3 7.8 8.1  < > 8.7   HGB 8.3* 8.1* 8.5*  < > 8.2*   MCV 89 87 88  < > 93    163 171  < > 174    141 141  < > 136   POTASSIUM 3.5 3.7 3.3*  < > 3.4   CHLORIDE 115* 116* 113*  < > 111*   CO2 19* 17* 19*  < > 14*   BUN 19 22 24  < > 45*   CR 3.56* 3.46* 3.62*  < > 3.82*   ANIONGAP 8 8 9  < > 11   OKSANA 7.9* 7.9* 8.0*  < > 7.8*   * 123* 122*  < > 137*   ALBUMIN  --   --   --   --  1.8*   PROTTOTAL  --   --   --   --  5.8*   BILITOTAL  --   --   --   --  0.2   ALKPHOS  --   --   --   --  38*   ALT  --   --   --   --  18   AST  --   --   --   --  27   < > = values in this interval not displayed.  No results found for this or any previous visit (from the past 24  hour(s)).    Physician Attestation   I, Jace Hernández, saw and evaluated Sandy Sheffield as part of a shared visit.  I have reviewed and discussed with the advanced practice provider their history, physical and plan.    I personally reviewed the vital signs, medications, labs and imaging.    My key history or physical exam findings: Patient seen and examined today.  Still with intermittent abdominal pain.  Otherwise denies discomfort    PE:   VS: Afebrile and stable  GEN: NAD  CV: RRR S1/S2, no m,r,g  Pulm: CTA b/l  Abd: soft, NT, diffuse abdominal pain  Ext: warm and well perfused, no edema    Key management decisions made by me: PET scan concerning fo ongoing aortic graft infection which lacks definitive treatment and currently causes pain.  Will continue antibiotics for now, and continue to have ongoing discussions of goals of care.      Jace Hernández  Date of Service (when I saw the patient): 11/24/17

## 2017-11-24 NOTE — PROGRESS NOTES
"[facility], [place]   Palliative Care Daily Progress Note          Recommendations, Patient/Family Counseling & Coordination     Participated in CC with daughter and Marguerite CHRISTENSEN from primary team.  Daughter heard the news that scan showed that AAA graft is infected, and that this would likely require \"lifelong\" abx therapy that wouldn't be curative, but only keep infection at bay.    Given Sandy's overall frailty and poor functional status, I believe she would be at high risk for complications including other infections, organ failure, potential skin breakdown, and intolerance of cares in general, including sundowning/delirium during that time.   Daughter was further able to express how much she feels her mother is suffering 2/2 her dementia, sundowning, inability to understand what's happening at night when receiving cares (feels threatened).    At this time dtr wants to update her siblings about findings of infected graft, and be sure that there's consensus within family about how to proceed.  There may be another CC with more family members over the weekend or on Monday, with anticipated DC of abx and institution of CMO at that time.     For now will continue abx, focus on comfort at the same time, i.e.appropriately liberal use of pain medication as clinically indicated.      Daughter anticipates wanting residential hospice care, not facility.  Discussed asking floor SW to put in application today at [facility], [facility], or other facilities for early next week.    I believe Sandy's life expectancy after withdrawal of abx will most likely be in the range of some # of days to limited number of weeks.  Daughter was kind of focused on \"well, it could be several months, couldn't it\"?  We discussed that it's conceivable, but in my professional judgement not likely.      Thank you for the opportunity to continue to participate in the care of this patient and family.  Please feel free to " contact on-call palliative provider with any emergent needs.  We can be reached via team pager [phone] (answered 8-4:30 Monday-Friday); after-hours answering service ([phone]); Main Palliative Clinic - [facility]: [phone].       Edgar Muñoz MD  Palliative Medicine Consult Team  Pager: [phone]        TT: 36 minutes, with > 50% spent in C/C/E patient/family/care teams re: GOC, POC, Sx management.   [id number]

## 2017-11-24 NOTE — PLAN OF CARE
"Problem: Patient Care Overview  Goal: Plan of Care/Patient Progress Review  Tachy at times (MD paged overnight about this, no change in current POC) - correlating with pain, restlessness, AOVSS on 2LPM on NC or face mask. Only oriented to self this shift, after IM zyprexa given was sometimes able to understand situation. Pt at times unable to verbalize or specify pain, did c/o pain \"all over\" once this shift, gave IV dilaudid with appearance of relief. Turned q 2hr when pt allows - was refusing all cares at beginning of shift, did move and roll frequently with attempts to void then Ireland placement. Honey thick liquids diet, total feed. [place] placed with good output. Sitter at bedside d/t pt restlessness, tendency to pull out lines. Pt needs to be off VPM/sitter for 24 hrs for successful discharge, cont to monitor. Unable to check pt's BG @12 am d/t pt's aggression/agitation, 0400 check no coverage needed. Midline infusing, left PIV SL with old drainage. Bilat necrotic toe wounds. Prevelon boots in place. Mepilex to coccyx CDI. Turn with assist of 2, pt very weak. Son at bedside. Cont POC.       "

## 2017-11-25 NOTE — PROGRESS NOTES
"Social Work Services Progress Note    Hospital Day: 10  Date of Initial Social Work Evaluation:  11/24/2017  Collaborated with:  Daughter    Data:  Weekend SW received page from RN that daughter requested to talk with SW.    Intervention:  Per chart review, referral has been sent for patient to [facility] residential hospice in anticipation of Monday discharge.  SW met with patient's daughter. Daughter explained that availability at [facility] had not been confirmed for Monday and she would like to \"Know my alternative options to make a plan B.\" Daughter requested lists of residential hospice facilities as well as skilled nursing facilities near the [facility].  She indicated concerns that [facility] would be too far away from family and that patient would have more visitors if she went to a facility closer to home.  SW reviewed insurance coverage with the family, indicating patient would likely have out of pocket room and board costs with any facility other than [facility].  They expressed understanding. Lists were provided.  Family indicated they are just considering their options at this time and did not request any additional referrals be made.    Assessment:  Family requested lists of other residential hospice facilities and nursing homes as they had expressed some concerns about [facility] location and availability for Monday.    Plan:    Anticipated Disposition:  [facility] vs other residential hospice/nursing home    Barriers to d/c plan:  availability    Follow Up:  JIA will continue to follow for discharge planning.    Soo Yeon Han, LICSW  Weekend Pager: [phone]      "

## 2017-11-25 NOTE — PROGRESS NOTES
Pt's two sons arrived @approx 0115 this am. Pt was sleeping comfortably before arrival, sons woke up pt to visit. Pt agitated upon waking. Sons eventually calmed down pt successfully.

## 2017-11-25 NOTE — PLAN OF CARE
Problem: Patient Care Overview  Goal: Plan of Care/Patient Progress Review  Discharge Planner SLP   Patient plan for discharge: Not stated this session  Current status: Pt with ongoing cough with nectar trials. Refused advanced solid trials. Recommend continue dysphagia 1 diet and honey thick liquids from spoon with 1:1 assist from nursing staff. Pt to be fully upright and alert for al PO intake.   Barriers to return to prior living situation: Cognition, medical stability, dysphagia  Recommendations for discharge: Hospice vs long term care  Rationale for recommendations: If desired by patient and family, ongoing SLP services appropriate at next level of care for dysphagia.       Entered by: Abbi Treviño 11/25/2017 11:30 AM

## 2017-11-25 NOTE — PROGRESS NOTES
[facility], [place]  Internal Medicine Progress Note - Gold Service      Assessment & Plan   Sandy Sheffield is a 85 year old female admitted on 11/16/2017. She has a history of HTN, DM type II, CKD, CAD, COPD, lupus, and AAA s/p endovascular repair (10/7/2017) c/b CVA and is admitted for suspected infection of her aortic graft and SANDRO. Hospital stay c/b acute delirium. PET-CT on 11/22 confirmed likely infection of her graft.      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 Casillas. Presented 11/16 with increased abdominal pain, weight loss, fatigue, and necrotic lesions on toes.  CT A/P with concern for infected graft, no e/o leak.  Vascular surgery consulted and patient felt not to be a good candidate for explantation, recommended conservative treatment.  ID consulted and following: has been maintained on IV Vanco and IV Zosyn.  Inflammatory markers improving, however, patient has not clinically improved.  PET-CT completed on 11/22 with final read 11/24 which findings consistent with infected graft with need for life long antibiotics if full medical care is pursued. Care conference held 11/24 with Palliative and daughter, Mary to explain findings.  Met with family (Mary) as well as 2 brothers again today and given the patient's lack of improvement and advanced age, they have elected to change her goals of care to comfort.   - D/C Zosyn  - Change goals of care to comfort care   - Social work following closely for hospice placement, currently looking at [facility] in [place]  - Pain control: PRN IV Dilaudid, will change to oral tomorrow in preparation for D/C to hospice in coming days  - Limit lab draws unless significant clinical change  - Appreciate ongoing input from palliative care, will discuss with them on Monday       Acute metabolic encephalopathy with delirium - Acute confusion noted during recent hospitalization for AAA repair and CVA. Gradual improvement by the time of discharge with further improvement at TCU. Daughter notes worsening confusion upon transfer to assisted living. Patient with suspected metabolic encephalopathy in setting of infection with acute hospital induced delirium. No electrolyte abnormalities. TSH elevated but free T4 normal. B12 elevated. No other clear source of infection, therefore if graft infection ruled out then likely hospital induced encephalopathy/delirium. Discussed with family who feel that current mental status not felt to be quality lifestyle. Exploring possible hospice. Agitation appears to be improved on scheduled zyprexa.  - Cont delirium precautions  - Cont zyprexa 5mg QHS and BID PRN  - D/C Haldol as increased agitation   - Avoid benzos (noted to have increased agitation following ativan)  - Ensure sleep hygiene and limit disruptions at night time---this was discussed with family today also.       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 Concern for thromboembolic event in setting of recent endovascular repair. Both toes with dark discoloration/eschar of tips, stable. Sensation intact in distal extremities, + pedal pulses.  - D/C doppler checks   - Vascular surgery following   - Pain control as above      SANDRO on CKD - Creatinine elevated at 4.27 on admission. Initial improvement w IVF, now stable at . Baseline Cr of approx 2.2-2.4. Left renal stent placed on 10/7. Renal US on admission with patent blood flow to both kidneys. Likely SANDRO multifactorial in setting of hypovolemia . UOP improved. Per discussion w family, would not be interested in HD if declining.  - Cont IV D5 1/2 NS + 20 mEq KCl @ 75 cc/hour  - I&O, daily weights  - Renally dose medications  - Avoid nephrotoxic agents as able  -  No further lab checks unless absolutely needed       Dysphagia with possible acute aspiration event - Concern for possible aspiration event overnight 11/17. CXR revealed increased moderate right pleural effusion with overlying atelectasis or infection. Afebrile. WBC normal. Vitals stable. SLP consulted. Currently stable on DD1 diet. Ongoing titration of supplemental 02. On broad spectrum abx which would adequately cover CAP and aspiration pneumonia. Given improving CRP, WBC, and stable O2 sats on room air, PNA felt to be less likely.  Family has declined feeding tube.   - Cont DD1 diet but OK to have ice chips per family (ok per family despite aspiration risk)  - SLP following     Recent posterior circulation CVA - MRI brain 10/11 following AAA repair revealed abnormal diffusion restriction in left > right occipital lobes and bilateral inferior cerebellar hemispheres c/w acute infarct. Felt possibly related to endovascular procedure. Cardioembolic source felt to be less likely. Repeat echo 11/16 negative for vegetations. Started on ASA and high-dose statin for stroke prophylaxis. Patient with some residual cognitive deficits.   - Cont PT/OT/SLP  - ASA and statin on hold d/t dysphagia      Normocytic Anemia - Most recent Hgb 8.3 (8.1), BL ~10-11 although limited data. No acute s/s of bleeding since admission. Likely anemia in setting of recent surgery, poor nutrition.      Hypokalemia - Likely d/t poor PO intake.  Maintained on replacement protocol.      Chronic Medical Problems:  Lupus. No records on file. No PTA medications. Elevated ESR and CRP on admission felt to be d/t infectious source. Nothing clearly pointing to lupus at this time. Would consider further evaluation if infectious work up negative.   DM2. A1C 6.7% this admission. Diet controlled. Hyperglycemic on admission. Now well controlled on medium intensity SSI.   COPD. No wheezing on exam. Cont albuterol PRN.   CAD.  ASA on hold d/t aspiration risk.      Diet: Dysphagia Diet Level 1 Pureed Honey Thickened Liquids (pre-thickened or use instant food thickener) (Use spoon only - no cup or straw)  Room Service  Snacks/Supplements Adult: Magic Cup; Between Meals  Fluids: D50.45NS at 75mL/hour   DVT Prophylaxis: Pneumatic Compression Devices  Code Status: Comfort Care    Disposition Plan   Expected discharge: 2 - 3 days, recommended to likely hospice facility pending family's decision once pending family discussion. .     Entered: Marguerite Nunez 11/25/2017, 2:44 PM   Information in the above section will display in the discharge planner report.      The patient's care was discussed with the Attending Physician, Dr. Hernández, Bedside Nurse, Care Coordinator/, Patient and Patient's Family.    Marguerite Nunez, [facility]-BC  Internal Medicine Staff Hospitalist Service  [facility]  Pager: [phone]  Please see sticky note for cross cover information    Interval History   Patient is not able to participate in a full ROS.  Much more awake today and interactive with family in room. Confused.  Denies abd pain, feels comfortable..  Notes a sore throat with dry mouth.  Had one episode of acute agitation last night 2/2 her sons arriving to the bedside and waking her to visit.      Data reviewed today: I reviewed all medications, new labs and imaging results over the last 24 hours. I personally reviewed no images or EKG's today.    Physical Exam   Vital Signs: Temp: 98.8  F (37.1  C) Temp src: Oral BP: 139/70 Pulse: 111 Heart Rate: 86 Resp: 16 SpO2: 98 % O2 Device: Nasal cannula Oxygen Delivery: 2 LPM  Weight: 167 lbs 1.6 oz    GENERAL: Alert and oriented x 1.  Sitting up in bed chatting with her family.  Appears comfortable.   HEENT: Anicteric sclera. Mucous membranes dry.  CV: RRR. S1, S2. No murmurs appreciated.   RESPIRATORY: Effort normal on RA.  Diminished BS in bases bilaterally.  GI: Abdomen soft and non distended, bowel sounds present. Tender  to light palpation in mid abdomen.  No rebound, guarding or s/s of peritonitis.   MUSCULOSKELETAL: No joint swelling or tenderness.   NEUROLOGICAL: No focal deficits. Moves all extremities.   EXTREMITIES: No peripheral edema. Intact bilateral pedal pulses.   SKIN: No jaundice. No rashes.       Data   Medications     dextrose 5% and 0.45% NaCl + KCl 20 mEq/L 75 mL/hr at 11/24/17 1701       sodium chloride (PF)  10 mL Intracatheter Q8H     heparin lock flush  5-10 mL Intracatheter Q24H     OLANZapine zydis  5 mg Oral At Bedtime     melatonin  3 mg Oral At Bedtime     lidocaine  1 patch Transdermal Q24h    And     lidocaine   Transdermal Q24H    And     lidocaine   Transdermal Q8H     sodium chloride (PF)  10 mL Intracatheter Q8H     sodium chloride (PF)  10 mL Intracatheter Q8H     sodium chloride (PF)  10 mL Intracatheter Q8H     pneumococcal vaccine  0.5 mL Intramuscular Prior to discharge     sodium chloride (PF)  3 mL Intracatheter Q8H     Data     Recent Labs  Lab 11/24/17  0742 11/23/17  0747 11/22/17  0833   WBC 9.3 7.8 8.1   HGB 8.3* 8.1* 8.5*   MCV 89 87 88    163 171    141 141   POTASSIUM 3.5 3.7 3.3*   CHLORIDE 115* 116* 113*   CO2 19* 17* 19*   BUN 19 22 24   CR 3.56* 3.46* 3.62*   ANIONGAP 8 8 9   OKSANA 7.9* 7.9* 8.0*   * 123* 122*     No results found for this or any previous visit (from the past 24 hour(s)).     Physician Attestation   I, Jace Hernández, saw and evaluated Sandy Sheffield as part of a shared visit.  I have reviewed and discussed with the advanced practice provider their history, physical and plan.    I personally reviewed the vital signs, medications, labs and imaging.    My key history or physical exam findings: Patient seen and examined today.  Clinically stable, and pleasantly confused.  Denies HA, dizziness, CP, N/V or other symptoms.    PE:   VS: Afebrile and stable  GEN: NAD  CV: RRR S1/S2, no m,r,g  Pulm: CTA b/l  Abd: soft, still with abdominal pain, ND,  +BS  Ext: warm and well perfused, no edema    Key management decisions made by me: PET/CT consistent with an endograft infection.  Given age, frailty, comorbidities, there is no definitive treatment for this.  Following discussion with family regarding goals of care, and they elected for comfort.      Jace Hernández  Date of Service (when I saw the patient): 11/25/17

## 2017-11-25 NOTE — PROGRESS NOTES
Vascular Surgery Progress Note     Notified that family is pursuing comfort care.     Vascular Surgery will sign off. Please re-consult with questions/concerns.     Dhaval Carcamo MD  Vascular Surgery Fellow  Pager: [phone]    As above. PET-CT reviewed abd and worrisome for graft infection.    Comfort care per notes.    Call if questions    Jace VERDUZCO  11/25/2017.  8:26 AM

## 2017-11-25 NOTE — PLAN OF CARE
Problem: PT General Care Plan  Goal: PT target date for goal attainment  PT: patient seen today to assist nursing. Patient not appropriate for therapy at this time secondary to increased confusion, combative and agitation. Patient required assist of 3 for bed mobility secondary to agitation. Will change frequency to 5 x a week and reassess frequency and goals as patient becomes more appropriate for therapy.      Current PT recommendation for discharge to TCU to improve strength, endurance and progress independence with functional mobility for safe return to assisted living facility.

## 2017-11-25 NOTE — PLAN OF CARE
Problem: Patient Care Overview  Goal: Plan of Care/Patient Progress Review  Pt's O2 is 91-92% RA while asleep, removes/refuses to wear oxymask. AOVSS. A&Ox1, only oriented to self. Pt agitated with staff, resistant for BG checks and repositioning. Pt had worsening agitation this shift, pulling at macario catheter, PRN Zyprexa given. Pt has bedside attendant. Pt repositioned q2h. Pt c/o back pain, PRN dilaudid given. Bilateral toes (digit 1) black/necrotic. Dorsalis pedis pulse normal, posterior tibialis pulse weak. Prevalon boots in place. Macario patent, draining adequate amounts. No BM, - flatus this shift. Continue POC.      Patient would not like to be woken up for bedside report at shift change. Pt increasingly agitated when woken up.

## 2017-11-25 NOTE — PLAN OF CARE
Problem: Patient Care Overview  Goal: Plan of Care/Patient Progress Review  O2 sats 91-94% on RA while asleep (removes/refuses to wear oxymask), occasionally tachy when agitated (team aware), AOVSS. Oriented only to self. Pt agitated when attempting to reposition pt or otherwise perform any cares. Pt's son refused 0400 bg check as pt was sleeping and she becomes so agitated during bg sticks (pt has also largely between WDL during bg checks - refer). Did not pull at lines, did play with no-no on right midline but redirected by sons with success. Bedside attendant for safety d/t pt's inclination to pull out lines. Pt repositioned self frequently at beginning of shift, repos q 2-3 hr this shift otherwise, last repos 0715. Bilat toes black/necrotic. Palpated pulses - pt was agitated with noise this shift and determined doppler would worsen agitation, posterior tibialis pulse weak. [place] with adequate UOP. No BM this shift. Rested between cares for majority of shift.     Patient would not like to be woken up for bedside report at shift change. Pt increasingly agitated when woken up

## 2017-11-25 NOTE — PLAN OF CARE
Problem: Patient Care Overview  Goal: Plan of Care/Patient Progress Review  Outcome: Declining  Pt was confused and a bit combative start of the shift. Prn IV dilaudid given at the start of the shift with good relief. Pt settled later part but still confused. Able to use the BSC with assist of 2. Had 2 good amount BM this shift. [place] in plpace with adequate urine volume. Daughter and sons are here had spoke with Marguerite Nunez NP. Pt now on comfort cares. Possible dc to hospice facility on Monday. Discussed plan of being off with attendant 24 hours prior to discharge with family and every agreed with the plan. Turned and reposition as pt allows. On special mattress bed. Bilateral big toe with necrotic tissue. Coccyx Mepilex changed this shift. Family would like to have pt some ice chips. NP placed order, okay to have ice chips. This RN and NA provided a small piece of ice chips to the pt and pt have some strong coughing incident. Notify MD about this and the family. PLAN: Comfort cares.    Family would like to have report during change of shift.

## 2017-11-26 NOTE — PLAN OF CARE
Problem: SLP General Care Plan  Goal: SLP target date for goal attainment  Outcome: Change based on patient need/priority Date Met: 11/26/17

## 2017-11-26 NOTE — PLAN OF CARE
Problem: Patient Care Overview  Goal: Plan of Care/Patient Progress Review  Speech Language Therapy Discharge Summary    Reason for therapy discharge:    Patient/family request discontinuation of services.    Progress towards therapy goal(s). See goals on Care Plan in [facility] electronic health record for goal details.  Goals not met.  Barriers to achieving goals:   Pt transitioned to comfort cares, family electing to discontinue SLP services.    Therapy recommendation(s):    Patient is below baseline diet. Current diet: Honey liquids and Dysphagia 1 diet. Family electing for comfort cares and OK with ice chips.

## 2017-11-26 NOTE — PLAN OF CARE
Problem: Patient Care Overview  Goal: Plan of Care/Patient Progress Review  DNR/DNI. Comfort cares maintained. Pt disoriented, only oriented to self. Attendant cont to be present for safety, pt less agitated this shift, no attempts to pull out lines. Right midline TKO, IV dilaudid given when pt restless. Dysphagia level 1 w/honey thickened liquids. [place] with good UOP. No BM overnight (had 2 BMs on days 11/25). Unable to do full skin assessment, pt asked to be left alone whenever asked/attempted. Sleeping majority of shift. Repositioning when pt allows- pt does move self in bed frequently. Sons at bedside. Pt needs to be without sitter/monitoring for 24 hrs before discharge.

## 2017-11-26 NOTE — PROGRESS NOTES
[facility], [place]  Internal Medicine Progress Note - Gold Service      Assessment & Plan   Sandy Sheffield is a 85 year old female admitted on 11/16/2017. She has a history of HTN, DM type II, CKD, CAD, COPD, lupus, and AAA s/p endovascular repair (10/7/2017) c/b CVA and is admitted for suspected infection of her aortic graft and SANDRO. Hospital stay c/b acute delirium. PET-CT on 11/22 confirmed likely infection of her graft.  Family has chosen to     Aortic Graft Infection - S/p endovascular repair for 9 cm descending AAA found incidentally 10/6/17.  Admitted to the hospital with abd pain, weight loss, fatigue, necrotic lesions on toes.  Imaging studies since admission have confirmed infected aortic graft.  Please see my prior progress notes for details.  ID consulted this admission.  Not a candidate for explantation.  Had been on IV Vanco and Zosyn until family elected to change goals of care to comfort measures.  Antibiotics d/cd 11/25.  - Social work following closely for hospice placement, currently looking at [facility] in [place]  - Pain control: Liquid Hydromorphone  - Appreciate ongoing input from palliative care, will discuss with them on Monday      Acute metabolic encephalopathy with delirium - Acute confusion noted during recent hospitalization for AAA repair and CVA. Gradual improvement by the time of discharge with further improvement at TCU. Daughter notes worsening confusion upon transfer to assisted living. Patient with suspected metabolic encephalopathy in setting of infection with acute hospital induced delirium.  Please see my prior progress notes this week for full details on workup.  Currently improved on Zyprexa.   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 Concern for  thromboembolic event in setting of recent endovascular repair. Both toes with dark discoloration/eschar of tips, stable. Sensation intact in distal extremities, + pedal pulses. Pain control as above.     SANDRO on CKD - Creatinine elevated at 4.27 on admission. Initial improvement w IVF, now stable at . Baseline Cr of approx 2.2-2.4. Left renal stent placed on 10/7. Renal US on admission with patent blood flow to both kidneys. Likely SANDRO multifactorial in setting of hypovolemia . UOP improved. Goals of care changed to comfort care.  [place] in place for retention.     Dysphagia with possible acute aspiration event - Concern for possible aspiration event overnight 11/17. CXR revealed increased moderate right pleural effusion with overlying atelectasis or infection. Has been managed on DD1 diet per SLP recommendations.   - Can offer food and drink for comfort per family request     Recent posterior circulation CVA - MRI brain 10/11 following AAA repair revealed abnormal diffusion restriction in left > right occipital lobes and bilateral inferior cerebellar hemispheres c/w acute infarct. Felt possibly related to endovascular procedure. Cardioembolic source felt to be less likely. Repeat echo 11/16 negative for vegetations. Started on ASA and high-dose statin for stroke prophylaxis. Patient with some residual cognitive deficits.       Normocytic Anemia - Most recent Hgb 8.3 (8.1), BL ~10-11 although limited data. No acute s/s of bleeding since admission. Likely anemia in setting of recent surgery, poor nutrition. No further CBC checks.       Hypokalemia - Likely d/t poor PO intake.  D/C protocol as limiting IV draws.      Chronic Medical Problems:  Lupus. No records on file. No PTA medications. Elevated ESR and CRP on admission felt to be d/t infectious source. Nothing clearly pointing to lupus at this time. Would consider further evaluation if infectious work up negative.   DM2. A1C 6.7% this admission. Diet controlled.  Hyperglycemic on admission. Now well controlled on medium intensity SSI.   COPD. No wheezing on exam. Cont albuterol PRN.   CAD.  ASA on hold d/t aspiration risk.     Diet: Dysphagia Diet Level 1 Pureed Honey Thickened Liquids (pre-thickened or use instant food thickener) (Use spoon only - no cup or straw)  Room Service  Snacks/Supplements Adult: Magic Cup; Between Meals  Fluids: D50.45NS at 75mL/hour   DVT Prophylaxis: Pneumatic Compression Devices  Code Status: Comfort Care    Disposition Plan   Expected discharge: 2 - 3 days, recommended to hospice facility once safe disposition plan/ TCU bed available.     Entered: Marguerite Nunez 11/26/2017, 4:04 PM   Information in the above section will display in the discharge planner report.      The patient's care was discussed with the Attending Physician, Dr. Hernández, Bedside Nurse, Patient and Patient's Family.    Marguerite Nunez, [facility]  Internal Medicine Staff Hospitalist Service  [facility]  Pager: [phone]  Please see sticky note for cross cover information    Interval History     Sandy is doing OK today.  She continually asks to eat and it is offered to her but then she refuses.  She is easily irritated with questioning.  Denies any complaints but per family, is experiencing some abd pain and back pain.  Family interested in oral dosing of pain medications.     Data reviewed today: I reviewed all medications, new labs and imaging results over the last 24 hours. I personally reviewed no images or EKG's today.    Physical Exam   Vital Signs: Temp: 98.2  F (36.8  C) Temp src: Axillary BP: 138/66   Heart Rate: 83 Resp: 18 SpO2: 97 % O2 Device: Nasal cannula Oxygen Delivery: 3 LPM  Weight: 167 lbs 1.6 oz    GENERAL: Alert and oriented x 1.  Sitting up in bed chatting with her family.  Appears comfortable.   HEENT: Anicteric sclera. Mucous membranes dry.  CV: RRR. S1, S2. No murmurs appreciated.   RESPIRATORY: Effort normal on RA.  Diminished BS in  bases bilaterally.  GI: Abdomen soft and non distended, bowel sounds present. Tender to light palpation in mid abdomen.  No rebound, guarding or s/s of peritonitis.   MUSCULOSKELETAL: No joint swelling or tenderness.   NEUROLOGICAL: No focal deficits. Moves all extremities.   EXTREMITIES: No peripheral edema. Intact bilateral pedal pulses.   SKIN: No jaundice. No rashes.       Data   Medications        heparin lock flush  5-10 mL Intracatheter Q24H     OLANZapine zydis  5 mg Oral At Bedtime     melatonin  3 mg Oral At Bedtime     Data     Recent Labs  Lab 11/24/17  0742 11/23/17  0747 11/22/17  0833   WBC 9.3 7.8 8.1   HGB 8.3* 8.1* 8.5*   MCV 89 87 88    163 171    141 141   POTASSIUM 3.5 3.7 3.3*   CHLORIDE 115* 116* 113*   CO2 19* 17* 19*   BUN 19 22 24   CR 3.56* 3.46* 3.62*   ANIONGAP 8 8 9   OKSANA 7.9* 7.9* 8.0*   * 123* 122*     No results found for this or any previous visit (from the past 24 hour(s)).     Physician Attestation   I, Jace Hernández, saw and evaluated Sandy Sheffield as part of a shared visit.  I have reviewed and discussed with the advanced practice provider their history, physical and plan.    I personally reviewed the vital signs, medications, labs and imaging.    My key history or physical exam findings: Patient seen and examined today.  Currently resting comfortably, in no distress.  Denies pain.    PE:   VS: Afebrile and stable  GEN: NAD  CV: Rest of exam defered for patient comfort    Key management decisions made by me: Ms Sheffield is an 85 year old with HTN, recent AAA repair who presented with acute delirium and findings consistent with an endograft infection which lacked available definitive treatment.  Currently on comfort cares, working on hospice replacement.    Jace Hernández  Date of Service (when I saw the patient): 11/26/17

## 2017-11-26 NOTE — PLAN OF CARE
Problem: Patient Care Overview  Goal: Plan of Care/Patient Progress Review  Outcome: Declining  On comfort care. Patient continues to be confused. Attendant present for safety. Patient is less combative this shift. IV Dilaudid given x2 for restlessness. On a dysphagia level 1 with honey-thickened liquids. Ice chips given for comfort. Infrequent non-productive cough. [place] with adequate urine output. IV fluids at TKO via midline IV. Patient repositioned as she allows us. Backside was not able to assessed. Family at the bedside. Continue with plan of care - awaiting hospice placement, attendant will need to be discontinued 24h prior to discharge.

## 2017-11-26 NOTE — PLAN OF CARE
Problem: SLP General Care Plan  Goal: SLP Frequency  Outcome: Change based on patient need/priority Date Met: 11/26/17

## 2017-11-26 NOTE — PLAN OF CARE
Problem: SLP General Care Plan  Goal: Swallow Goal: Safely tolerate diet without aspiration (SLP)  Safely tolerate diet without signs/symptoms of aspiration   Outcome: Change based on patient need/priority Date Met: 11/26/17

## 2017-11-26 NOTE — PLAN OF CARE
Problem: Patient Care Overview  Goal: Plan of Care/Patient Progress Review  Outcome: No Change  HD10 admitted with AAA. Alert to self only. On comfort cares, PIV is HL. On a dysphagia 3 with honey thick liquids, but ok for ice chips, aspiration precautions. Pain is controlled with PO dilaudid that can be crushed and given with something honey thick. Nothing given today. Sitter Dc'd at 0800. VPM on, pt is not restless, easily reoriented. No assessments done, pt refused. [place] has adequate UO, no cares complete, pt refused. Plan is to discharge tomorrow to hospice.     Loose BM, Coccyx wound is pink, open to air, on the air bed for change of position. Unable to apply dressing to coccyx due to intolerance of pericare.

## 2017-11-26 NOTE — PROGRESS NOTES
Pt. Scheduled to discharge 11/27 at 1000 to [facility] in [place]. Chantal Rasmussen'd at 0800, VPM in use.

## 2017-11-27 NOTE — PLAN OF CARE
Problem: Patient Care Overview  Goal: Plan of Care/Patient Progress Review  A&O only to self. Pt on VPM and bed alarm. Family at bedside, attentive to pt's needs. Pt on comfort cares. Pt c/o L shoulder pain, PRN dilaudid given 1x. Pt tolerating small amounts of honey nectar thick liquids/foods, on aspiration precautions. [place] patent with good urine output. Pt had 2 BMs this shift, mepilex changed, coccyx sore is pink. Writer only able to reposition pt when pt allows. Continue POC.

## 2017-11-27 NOTE — DISCHARGE SUMMARY
[facility]  Internal Medicine Discharge Summary- Gold Service    Date of Admission:  11/16/2017  Date of Discharge:  11/27/2017  Discharging Provider: Marguerite Nunez, [facility]. Hernández   Discharge Team: Gold 3    Discharge Diagnoses   1.  Aortic Graft Infection  2.  Acute Metabolic Encephalopathy with Delirium  3.  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  SANDRO on CKD  5.  Dysphagia with possible aspiration  6.  Recent posterior circulaton CVA  7.  Normocytic Anemia   8.  Hypokalemia   9.  History of Lupus  10.  History of DMII  11.  COPD   12.  CAD    Follow-ups Needed After Discharge   1.  Follow up at [facility] on admission to their facility for assessment and medication reconciliation     Hospital Course   Sandy Sheffield is a 85 year old female admitted on 11/16/2017. She has a history of HTN, DM type II, CKD, CAD, COPD, lupus, and AAA s/p endovascular repair (10/7/2017) c/b CVA and is admitted for suspected infection of her aortic graft and SANDRO. Hospital stay c/b acute delirium. PET-CT on 11/22 confirmed likely infection of her graft.  Family has chosen to      Aortic Graft Infection.  S/p endovascular repair for 9 cm descending AAA found incidentally 10/6/17.  Admitted to the hospital with abd pain, weight loss, fatigue, necrotic lesions on toes.  Imaging studies since admission have confirmed infected aortic graft.  Please see my prior progress notes for details.  ID consulted this admission.  Not a candidate for explantation.  Had been on IV Vanco and Zosyn until family elected to change goals of care to comfort measures therefore antibiotics were discontinued on 11/26.  She is now being discharged to [facility] in [place].  For her abdominal pain, she will be discharged on liquid Dilaudid PRN.  This can be increased per hospice based on patient's needs.       Acute metabolic encephalopathy with delirium.  Acute confusion  noted during recent hospitalization for AAA repair and CVA. Gradual improvement by the time of discharge with further improvement at TCU. Daughter notes worsening confusion upon transfer to assisted living. Patient with suspected metabolic encephalopathy in setting of infection with acute hospital induced delirium.  Her mental status and agitation significantly worsened after receiving benzodiazepines and Haldol therefore these were discontinued from her medical regimen.  She tolerated Zyprexa well and will be discharged on Zyprexa 5-10 mg BID PRN and 5 mg at bedtime.        Acral necrosis of bilateral great toes d/t thromboembolism.  Noted progressive discoloration of bilateral great toes x 1 week PTA. Concern for thromboembolic event in setting of recent endovascular repair. Both toes with dark discoloration/eschar of tips, stable. Sensation intact in distal extremities, + pedal pulses. Pain control as above.      SANDRO on CKD; Urinary Retention.  Creatinine elevated at 4.27 on admission. Initial improvement w IVF, now stable at . Baseline Cr of approx 2.2-2.4. Left renal stent placed on 10/7. Renal US on admission with patent blood flow to both kidneys. Likely SANDRO multifactorial in setting of hypovolemia . UOP improved. Goals of care changed to comfort care.  [place] in place for retention which should be continued on discharge.      Dysphagia with possible acute aspiration event.  Concern for possible aspiration event overnight 11/17. CXR revealed increased moderate right pleural effusion with overlying atelectasis or infection. Has been managed on DD1 diet per SLP recommendations. On discharge, she can be offered food and liquids based on patient's comfort and per family request.       Recent posterior circulation CVA.  MRI brain 10/11 following AAA repair revealed abnormal diffusion restriction in left > right occipital lobes and bilateral inferior cerebellar hemispheres c/w acute infarct. Felt possibly related to  endovascular procedure. Cardioembolic source felt to be less likely. Repeat echo 11/16 negative for vegetations. Started on ASA and high-dose statin for stroke prophylaxis. Patient with some residual cognitive deficits.       Normocytic Anemia.  Most recent Hgb 8.3 (8.1), BL ~10-11 although limited data. No acute s/s of bleeding since admission. Likely anemia in setting of recent surgery, poor nutrition.      Hypokalemia. Likely d/t poor PO intake.  D/C protocol as limiting IV draws.     Lupus. No records on file. No PTA medications. Elevated ESR and CRP on admission felt to be d/t infectious source. Nothing clearly pointing to lupus at this time. Would consider further evaluation if infectious work up negative.     DM2. A1C 6.7% this admission. Diet controlled. Have discontinued glucose checks and insulin.     COPD. No wheezing on exam. Cont albuterol PRN for dyspnea.     CAD.  ASA discontinued given comfort care status.     Consultations This Hospital Stay   VASCULAR ACCESS CARE ADULT IP CONSULT  VASCULAR SURGERY ADULT IP CONSULT  WOUND OSTOMY CONTINENCE NURSE  IP CONSULT  VASCULAR ACCESS CARE ADULT IP CONSULT  INFECTIOUS DISEASE GENERAL ADULT IP CONSULT  PHYSICAL THERAPY ADULT IP CONSULT  OCCUPATIONAL THERAPY ADULT IP CONSULT  PHARMACY TO DOSE VANCO  SWALLOW EVAL SPEECH PATH AT BEDSIDE IP CONSULT  VASCULAR ACCESS CARE ADULT IP CONSULT  VASCULAR ACCESS ADULT IP CONSULT  VASCULAR ACCESS CARE ADULT IP CONSULT  PALLIATIVE CARE ADULT IP CONSULT  WOUND OSTOMY CONTINENCE NURSE  IP CONSULT  PHYSICAL THERAPY ADULT IP CONSULT  OCCUPATIONAL THERAPY ADULT IP CONSULT  VASCULAR ACCESS CARE ADULT IP CONSULT  VASCULAR ACCESS ADULT IP CONSULT  VASCULAR ACCESS CARE ADULT IP CONSULT  VASCULAR ACCESS CARE ADULT IP CONSULT  VASCULAR ACCESS CARE ADULT IP CONSULT  MEDICATION HISTORY IP PHARMACY CONSULT  VASCULAR ACCESS CARE ADULT IP CONSULT     Code Status   Full Code    Time Spent on this Encounter   IMarguerite, personally saw  the patient today and spent greater than 30 minutes discharging this patient.       Marguerite Nunez, [facility]   Internal Medicine Staff Hospitalist Service  [facility]  Pager: [phone]  ______________________________________________________________________    Physical Exam   Vital Signs:                    Weight: 167 lbs 1.6 oz    GENERAL: Appears very comfortable on exam, sleeping.    The rest of the exam was deferred to allow the patient to rest comfortably.      Significant Results and Procedures   Results for orders placed or performed during the hospital encounter of 11/16/17   XR Chest Port 1 View    Narrative    Exam: XR CHEST PORT 1 VW, 11/16/2017 6:31 PM    Indication: abdominal pain, Failure to thive, COPD hx, Please eval for  acute patho;     Comparison: 10/16/2017    Findings:   New small right pleural effusion with overlying lung opacities.  Thickening of the minor fissure. Coronary artery stents. Aortic arch  calcification. Left costophrenic angle is sharp. No pneumothorax.  Central airways midline. Architectural changes of the lungs suggesting  emphysema.      Impression    Impression:   1. New small right pleural effusion with right basilar atelectasis or  consolidation.  2. Findings suggesting lung emphysema.    I have personally reviewed the examination and initial interpretation  and I agree with the findings.    GABRIELA JEFFRIES MD   CT Abdomen Pelvis w/o Contrast     Value    Radiologist flags Intraluminal air within the aorta, concerning for (Urgent)    Narrative    EXAMINATION: CT ABDOMEN PELVIS W/O CONTRAST  11/16/2017 11:05 PM      CLINICAL HISTORY: intermittent abdominal pain, weight loss, failure to  thrive s/p endovascular AAA repair; . Per chart, history of 9 cm  abdominal aortic aneurysm with endovascular repair and left renal  stent placement on 10/7/2017.    COMPARISON: None available    PROCEDURE COMMENTS: CT of the abdomen was performed with oral  contrast.  Coronal and sagittal reformatted images were obtained.    FINDINGS:  Lower thorax:   Small right-sided pleural effusion with overlying atelectasis versus  consolidation. Calcifications along the right hemidiaphragm. Mild  bronchial wall thickening lung bases. 2 solid pleural-based nodules in  the left lower lobe including 7 mm nodule (series 5 image 38) and 5 mm  nodule (series 5 image 23). Calcifications the coronary arteries. No  pericardial effusion.    Abdomen and pelvis:  There is a large saccular abdominal aortic aneurysm that measures up  to 8.7 cm in transverse diameter (series 3 image 45) and extends from  the origins of the renal arteries to the aortic bifurcation,  approximately 12 cm in crown collateral dimension (series 4 image 65).  There is an postoperative changes of endovascular graft placement  which starts at the origin of the superior mesenteric artery and  extends inferiorly to the bifurcation of the common iliac arteries. A  left renal artery stent is noted. There is a significant amount of  hyperattenuating fluid surrounding the endovascular graft with  multiple foci of air (series 5 images 158-200). Additionally there is  haziness/stranding in the retroperitoneal fat along the aorta. There  is no free fluid in the abdomen or retroperitoneum to suggest  rupture/leak. No free intraperitoneal air.    Cholecystectomy. The liver, adrenal glands, and pancreas are normal in  appearance. Calcified splenic granulomata. Multifocal simple and  proteinaceous/hemorrhagic renal cysts bilaterally including 4.3 cm  cyst in the midpole the left kidney. No hydronephrosis or  nephrolithiasis. The bladder is distended. Uterus and adnexa appear  normal for age.    There are no abnormally dilated or thickened loops of small bowel or  colon. The appendix is normal. Colonic diverticulosis without  diverticulitis. There is a [place] ventral abdominal wall defect  measuring up to 5.8 cm containing loops of  nonobstructed small bowel.  Contrast noted into the ascending colon. 1.0 cm lymph node under the  cynthia hepatis (series 5 image 90).    Osseous structures and soft tissues:   No acute fractures or suspicious bony lesions. Multilevel advanced  degenerative changes of the lower thoracic and lumbar spine including  large Schmorl's node in inferior endplate of T12. Disc vacuum  phenomenon L3-S1. Vertebral hemangioma L2. Total right hip  arthroplasty. The bones appear osteopenic. There is diffuse muscle  atrophy. Some mild cutaneous edema.      Impression    IMPRESSION:  1. Postoperative changes from aortic endovascular graft and left renal  artery stent placement for an abdominal aortic aneurysm that measures  up to 8.7 cm in max transverse diameter and spans approximately 12 cm  from the origin of the renal arteries to the aortic bifurcation. There  is a large hypoattenuating fluid collection that surrounds the  endovascular graft with multiple foci of intraluminal air along with  diffuse, mild stranding of the retroperitoneal fat adjacent to the  aorta. Findings are most concerning for infected thrombus/graft though  cannot rule out sequelae of postoperative changes. Technetium labeled  white blood cells with SPECT-CT can be used for evaluation of the  graft infections with high sensitivity and specificity. Additionally,  consider comparing with outside images to assess for possible  endoleak. If films are located, an addendum can be rendered.  2. No evidence of abdominal or retroperitoneal hemorrhage to suggest  aortic leak/rupture.  3. Small right-sided pleural effusion with overlying  atelectasis/consolidation.  4. Two solid, pleural-based nodules in the left lower lobe as  described above, largest measuring 7 mm. Consider comparing with prior  images from outside institution or could obtain dedicated chest CT for  further characterization.  5. Prominent periaortic lymph nodes, presumably reactive.    [Urgent  Result: Intraluminal air within the aorta, concerning for  infected graft/thrombus]    Finding was identified on 11/16/2017 11:38 PM.     Dr. Hernandez was contacted by Dr. Mendez at 11/16/2017 11:47 PM and  verbalized understanding of the urgent finding.      I have personally reviewed the examination and initial interpretation  and I agree with the findings.    EDELMIRA ROGERS MD   US Renal Complete    Narrative    EXAMINATION: Renal ultrasound, 11/16/2017 11:52 PM     COMPARISON: None.    HISTORY: Recent repair of 9 cm AAA. Admit with abdominal pain, trachea  and left extremity ischemic changes.    FINDINGS:    Right kidney: Measures 9.3 cm in length. Parenchyma is of normal  thickness and echogenicity. No solid focal mass. No hydronephrosis. A  few simple cysts including 1.1 x 1.0 x 0.9 cm cyst in the superior  pole along with a 1.8 x 1.1 x 1.1 cm cyst in the midpole. There is  Doppler blood flow to the right kidney.    Left kidney: Measures 9.1 cm in length. Parenchyma is of normal  thickness and echogenicity. No solid focal mass. No hydronephrosis.  There is a 4.1 x 3.7 x 4.3 cm simple cyst in the midpole the left  kidney. There is Doppler blood flow to the left kidney.    Bladder: Distended with normal appearance.      Impression    IMPRESSION:  1.  Doppler blood flow is noted to the kidneys bilaterally.  2.  Multiple simple renal cysts as described above enlarged measuring  up to 4.3 cm in the midpole left kidney.    I have personally reviewed the examination and initial interpretation  and I agree with the findings.    EDELMIRA ROGERS MD   US Aorta/IVC/Iliac Duplex Limited    Narrative    Ultrasound aorta iliac duplex 11/17/2017 9:25 AM    History: Recent history of AAA repair, now concern with emboli to feet  and infected graft.    Comparison: CT from 11/16/2017    Findings: The exam is limited due to the presence of overlying bowel  gas. The proximal end of the graft, near the origin of the superior  mesenteric  artery is visualized, and is patent. The graft in the mid  and lower abdomen is entirely obscured.      Impression    IMPRESSION: THE MAJORITY OF THE GRAFT IS OBSCURED BY BOWEL GAS. THE  PROXIMAL GRAFT IS PATENT.    I have personally reviewed the examination and initial interpretation  and I agree with the findings.    DEBBIE LOVE MD   XR Chest Port 1 View    Narrative    XR CHEST PORT 1 VW  11/17/2017 8:49 PM      HISTORY: Dyspnea;     COMPARISON: 11/16/2017    FINDINGS: Portable AP view of the chest. Increased moderate right  pleural effusion with overlying streaky opacities. Heart size is  normal. The left lung is relatively clear. No pneumothorax.      Impression    IMPRESSION: Increased moderate right pleural effusion with overlying  atelectasis or infection    I have personally reviewed the examination and initial interpretation  and I agree with the findings.    JUAN CARLOS ARREDONDO MD   PET Oncology Whole Body    Narrative    Combined Report of:    PET and CT on  11/22/2017 5:02 PM :    1. PET of the neck, chest, abdomen, and pelvis.  2. PET CT Fusion for Attenuation Correction and Anatomical  Localization:    3. Diagnostic CT scan of the chest, abdomen, and pelvis without  intravenous contrast for interpretation.  3. CT of the chest, abdomen and pelvis obtained for diagnostic  interpretation.  4. 3D MIP and PET-CT fused images were processed on an independent  workstation and archived to PACS and reviewed by a radiologist.    Technique:    1. PET: The patient received 12.9 mCi of F-18-FDG; the serum glucose  was 113 prior to administration, body weight was 89 kg. Images were  evaluated in the axial, sagittal, and coronal planes as well as the  rotational whole body MIP. Images were acquired from the Vertex to the  Feet.    UPTAKE WAS MEASURED AT 60 MINUTES.     2. CT: Volumetric acquisition for clinical interpretation of the  chest, abdomen, and pelvis acquired at 3 mm sections . The chest,  abdomen,  and pelvis were evaluated at 5 mm sections in bone, soft  tissue, and lung windows. Patient was unable to hold her breath for  the breath hold images of the chest and lung windows.     3. 3D MIP and PET-CT fused images were processed on an independent  workstation and archived to PACS and reviewed by a radiologist.    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 Blood cultures NGTD. PET-CT recommended to further  rule in/out infection     ADDITIONAL INFORMATION OBTAINED FROM EMR: AAA s/p endovascular repair  (10/7/2017    COMPARISON: CT 11/16/2017    FINDINGS:     HEAD/NECK:  There is no  suspicious FDG uptake in the neck.     Confluent periventricular and subcortical white matter hypodensities  are nonspecific but favored to represent sequelae of chronic small  vessel ischemic disease. Bilateral occipital lobe hypodensities are  compatible with prior infarcts, as seen on MRI 10/11/2017.    The paranasal sinuses are clear. The mastoid air cells are clear.     The mucosal pharyngeal space, the , prevertebral and carotid  spaces are within normal limits.     No masses, mass effect or pathologically enlarged lymph nodes are  evident. Diminutive thyroid gland without focal abnormality.    CHEST:  Partially visualized PICC in the right upper extremity.    In the peripheral right upper lobe, there is a small area of increased  FDG uptake (axial series 3, image 130), SUV max of 4.5.    Prominent mediastinal lymph nodes without FDG uptake. Heart is normal  in size. Lipomatous hypertrophy of interatrial septum. There are dense  coronary calcifications involving the right and left coronary artery  distributions. No pericardial effusion. Moderate loculated right  pleural effusion and small loculated left pleural effusion. Right  basilar calcifications. Centrilobular and paraseptal emphysematous  changes of the lungs. There is a 6 mm subpleural left lower lobe  pulmonary  nodule.    ABDOMEN AND PELVIS:  Post surgical changes of abdominal aortic aneurysm endovascular repair  and left renal artery stent placement. Near the aneurysm neck adjacent  to the aortic graft and inferior to the renal artery stent, there is a  focal area of FDG uptake, SUV max 6.3. Additionally, there are nodular  areas of increased FDG uptake along the left lateral aneurysm sac  wall, SUV max 5.5. The aneurysm sac has a maximum diameter of 9.4 cm  (axial series 3, image 211), previously 9.2 cm. There is a small focus  of gas within the excluded aneurysm sac, decreased from 11/16/2017.  Mild inflammatory changes surrounding the abdominal aortic aneurysm.    There is a focal area of FDG uptake in the sigmoid colon, SUV max  11.3.    Cholecystectomy clips. The liver, adrenal glands, pancreas, and small  bowel are unremarkable. Calcified splenic granulomas. Spleen is normal  in size. Atrophic kidneys with simple fluid density renal cysts. In  the right upper pole, there is a 9 mm exophytic hyperdense lesion.  Colonic diverticulosis. Small amount of simple free pelvic fluid. No  suspicious lymphadenopathy.    LOWER EXTREMITIES:   No abnormal masses or hypermetabolic lesions. The left femoral vein is  diminutive and there is focal dilatation of the distal popliteal vein.  Right femoral arthroplasty appears normal.    BONES:   There is no abnormal FDG uptake in the skeleton. Chronic T12 anterior  compression deformity with a probable inferior endplate Schmorl's  node.      Impression    IMPRESSION:   1. Postsurgical changes of the AAA endovascular repair and left renal  artery stent placement, with the aneurysm sac maximum diameter of 9.4  cm, previously 9.2 cm. There is asymmetric FDG uptake near the  aneurysm neck adjacent to the aortic graft and inferior to the renal  artery stent, and additional nodular areas of FDG uptake along the  left lateral aneurysm sac wall, with associated mild periaortic fat  stranding  and a small focus of gas in the excluded aneurysm sac. 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 The most suspicious area is  just superior to the left renal artery stent along the left side.    2. Small area of increased FDG uptake in the peripheral right upper  lobe. Differential includes loculated pleural fluid, atelectasis,  infection, or infarction.    3. Focal area of FDG uptake in the sigmoid colon. Differential  includes benign and malignant etiologies. If clinically indicated,  colonoscopy may be considered.    4. Bilateral loculated pleural effusions, right greater than left.    5. Diminutive left femoral vein and focal dilatation of the distal  popliteal vein, possibly representing chronic venous thrombosis.  Consider lower extremity venous ultrasound.    6. Large multifocal coronary artery calcifications.    7. Atrophic kidneys with simple fluid density cysts. There is an  additional 9 mm exophytic hyperdense lesion in the right upper pole  which is too small to accurately characterize.    Resident preliminary results of suspected endograft infection were  discussed with the vascular surgery team at 8:30 AM on 11/24/2017.    I have personally reviewed the examination and initial interpretation  and I agree with the findings.    JUAN CARLOS ARREDONDO MD       Pending Results   These results will be followed up by   Unresulted Labs Ordered in the Past 30 Days of this Admission     No orders found from 9/17/2017 to 11/17/2017.             Primary Care Physician   CORY GIBSON    Discharge Disposition   Admited to hospice care.   Agency: [facility].  Discharged to home  Condition at discharge: Terminal    Discharge Orders     Discharge Medications   Current Discharge Medication List      START taking these medications    Details   melatonin 3 MG tablet Take 1 tablet (3 mg) by mouth At Bedtime  Qty: 30 tablet, Refills: 0     Associated Diagnoses: Failure to thrive (0-17)      !! OLANZapine zydis (ZYPREXA) 5 MG ODT tab Take 1-2 tablets (5-10 mg) by mouth 2 times daily as needed for agitation or other (Delirium; 1st line therapy)    Associated Diagnoses: Comfort measures only status      !! OLANZapine zydis (ZYPREXA) 5 MG ODT tab Take 1 tablet (5 mg) by mouth At Bedtime    Associated Diagnoses: Comfort measures only status       !! - Potential duplicate medications found. Please discuss with provider.      CONTINUE these medications which have CHANGED    Details   albuterol (PROAIR HFA/PROVENTIL HFA/VENTOLIN HFA) 108 (90 BASE) MCG/ACT Inhaler Inhale 2 puffs into the lungs 4 times daily as needed for other (dyspnea)    Associated Diagnoses: Comfort measures only status      ondansetron (ZOFRAN-ODT) 4 MG ODT tab Take 1 tablet (4 mg) by mouth every 6 hours as needed for nausea or vomiting  Qty: 120 tablet    Associated Diagnoses: Comfort measures only status         STOP taking these medications       ALPRAZOLAM PO Comments:   Reason for Stopping:         OXYCODONE HCL PO Comments:   Reason for Stopping:         OXYCODONE HCL PO Comments:   Reason for Stopping:         polyethylene glycol (MIRALAX/GLYCOLAX) powder Comments:   Reason for Stopping:         senna-docusate (SENOKOT-S;PERICOLACE) 8.6-50 MG per tablet Comments:   Reason for Stopping:         scopolamine (TRANSDERM) 72 hr patch Comments:   Reason for Stopping:         rosuvastatin (CRESTOR) 40 MG tablet Comments:   Reason for Stopping:         ASPIRIN PO Comments:   Reason for Stopping:             Allergies   Allergies   Allergen Reactions     Ibuprofen GI Disturbance     History of ulcers     Penicillins Nausea and Vomiting     Shrimp Nausea and Vomiting     Tylenol [Acetaminophen] Unknown     Exacerbates her lupus     Physician Attestation   I, Jace Hernández, saw and evaluated Sandy Sheffield as part of a shared visit.  I have reviewed and discussed with the advanced  practice provider their history, physical and plan.    I personally reviewed the vital signs, medications, labs and imaging.    My key history or physical exam findings: Patient seen and examined today.  Appears comfortable, stably disoriented.  Denies pain currently, dyspnea, or other discomfort currently. .     PE:   VS: Afebrile and stable  GEN: NAD  CV: RRR S1/S2, no m,r,g  Pulm: CTA b/l  Abd: soft, NT, ND, +BS  Ext: warm and well perfused, no edema    Key management decisions made by me: Given aortic graft infection, ongoing signs of atheroemboli, and lack of definitive treatment.  Patient will be discharged to hospice for ongoing comfort.    >50% of 25 minutes spent in direct patient care and coordinating discharge.    Jace Hernández  Date of Service (when I saw the patient): 11/27/17

## 2017-11-27 NOTE — PLAN OF CARE
Problem: Patient Care Overview  Goal: Plan of Care/Patient Progress Review  PT discontinued as pt on comfort cares with plan to discharge to hospice today.    Physical Therapy Discharge Summary    Reason for therapy discharge:    Discharged to Hospice care  Patient/family request discontinuation of services.    Progress towards therapy goal(s). See goals on Care Plan in [facility] electronic health record for goal details.  Goals not met.  Barriers to achieving goals:   limited tolerance for therapy.    Therapy recommendation(s):    No further therapy is recommended.

## 2017-11-27 NOTE — PLAN OF CARE
Problem: Patient Care Overview  Goal: Plan of Care/Patient Progress Review  Outcome: No Change  Comfort cares. A&O to self only. VPM and bed alarm on. Two sons are also at bedside. Pt pulled midline, MDs aware. [place] intact. Pt would not allow any sort of assessment. Slept through most of the night. Continue to monitor.     Bedside report not appropriate.

## 2017-11-27 NOTE — PROGRESS NOTES
Clinical Nutrition Services    Patient transitioned to comfort cares.     RD to sign off at this time unless consulted.    The above note was completed by Clau Carvajal RD, LD Pager [phone]

## 2017-11-27 NOTE — PROGRESS NOTES
Social Work Services Discharge Note      Patient Name:  Sandy Sheffield     Anticipated Discharge Date:  11/27/17    Discharge Disposition:   Hospice:  [facility] ([phone], f: [phone])    Following MD:  Per facilities designation     Pre-Admission Screening (PAS) online form has been completed.  The Level of Care (LOC) is:  Determined  Confirmation Code is:  N/A     Additional Services/Equipment Arranged:  SW arranged stretcher transport through [facility] (Ph: [phone]) d/t hospice.      Patient / Family response to discharge plan:  In agreement     Persons notified of above discharge plan:  Pt, Pt's dtr (Mary), Bedside RN (Luh), Charge RN (Deepali), NST, [facility] (Kim), [facility] 3 NP (Marguerite)    Staff Discharge Instructions:  SW faxed discharge orders.  Please print a packet and send with patient.     CTS Handoff completed:  NO    Medicare Notice of Rights provided to the patient/family:  YES    SIMRAN Bonds, SEE  7[facility] Surgical Oncology Unit   [phone]  Pager: [phone]

## 2017-11-27 NOTE — PLAN OF CARE
Problem: Patient Care Overview  Goal: Plan of Care/Patient Progress Review  Outcome: Declining  Pt on comfort cares. Disoriented to place and time, oriented to self only. Gave zyprexa 1x. Had large BM and after a few attempts, allowed nurses to clean and change her. Refused focused assessment. Left unit via transport with daughter and 2 sons to hospice. [place] intact. Copy of discharge instructions provided to daughter.

## 2019-02-27 NOTE — PROGRESS NOTES
Calorie Counts    Intake Recorded For: 10/12 Kcals: 654 Protein: 28g    # Meals Recorded: 2 meals   (First: 100% cottage cheese, popsicle, apple juice)    (Second: 100% mac and cheese, fruit cup)    # Supplements Recorded: 0   27-Feb-2019 19:19

## 2022-02-17 PROBLEM — R62.51 FAILURE TO THRIVE IN PEDIATRIC PATIENT: Status: ACTIVE | Noted: 2017-01-01

## 2023-12-14 NOTE — PLAN OF CARE
Problem: Patient Care Overview  Goal: Plan of Care/Patient Progress Review  Outcome: No Change  Alert and oriented x 3. Complained of epigastric pain. MD notified and MD was going to look into it. Hot pack was applied. Voided in the bedpan and was also incontinent. Call light in reach and calls appropriately and a little frequently towards morning. Pt wanted company. Bed alarm on. Blood glucose was 111.        Point of Care Ultrasound Other

## (undated) DEVICE — INTRO SHEATH BRITE TIP 6FRX11CM 401-611M

## (undated) DEVICE — Device

## (undated) DEVICE — GLOVE PROTEXIS W/NEU-THERA 7.0  2D73TE70

## (undated) DEVICE — LINEN TOWEL PACK X30 5481

## (undated) DEVICE — DEVICE MULTI TORQUE TD01

## (undated) DEVICE — CATH BALLOON RELIANT STENT GRAFT 8FR REL46

## (undated) DEVICE — KIT MICRO-INTRODUCER STIFFEN 4FR 7274V

## (undated) DEVICE — SU VICRYL 3-0 SH 27" J316H

## (undated) DEVICE — INTRO SHEATH MICRO PLATINUM TIP 4FRX40CM 7274

## (undated) DEVICE — CATH ANGIO TORCON BECAON TIP JB-1 5FRX65CM G11208

## (undated) DEVICE — CATH TRAY FOLEY SURESTEP 16FR W/URNE MTR STLK LATEX A303316A

## (undated) DEVICE — SU MONOCRYL 4-0 PS-2 27" UND Y426H

## (undated) DEVICE — DRSG TEGADERM 2 3/8X2 3/4" 1624W

## (undated) DEVICE — PREP CHLORAPREP CLEAR 3ML 260400

## (undated) DEVICE — SU PROLENE 5-0 C-1 DA 24" 8725H

## (undated) DEVICE — DECANTER VIAL 2006S

## (undated) DEVICE — SU VICRYL 2-0 SH 27" UND J417H

## (undated) DEVICE — DRSG TEGADERM 4X4 3/4" 1626W

## (undated) DEVICE — DRAPE SHEET HALF 40X60" 9358

## (undated) DEVICE — SHEATH INTRODUCER DRYSEAL FLEX W/HYDRO CT 16FRX33CM DSF1633

## (undated) DEVICE — TUBING MEDRAD 48" HIGH PRESSURE MX694

## (undated) DEVICE — SU DERMABOND ADVANCED .7ML DNX12

## (undated) DEVICE — WIPES FOLEY CARE SURESTEP PROVON DFC100

## (undated) DEVICE — GUIDEWIRE TERUMO .035X260CM ANG EXCHANGE GR3509

## (undated) DEVICE — SOL NACL 0.9% INJ 1000ML BAG 2B1324X

## (undated) DEVICE — CLIP HORIZON SM RED WIDE SLOT 001201

## (undated) DEVICE — DRAPE C-ARM W/STRAPS 42X72" 07-CA104

## (undated) DEVICE — SUTURE BOOTS 051003PBX

## (undated) DEVICE — CATH ANGIO OMNI FLUSH 4FRX65CM STR MARINER 11714001

## (undated) DEVICE — DRAPE SHEET REV FOLD 3/4 9349

## (undated) DEVICE — CATH SOS OMNI-2 5FRX80CM BRAID 10712002

## (undated) DEVICE — SU PROLENE 6-0 BV-1 DA 24" 8805H

## (undated) DEVICE — INTRO SHEATH BRITE TIP 4FRX11CM 401-411M

## (undated) DEVICE — BLADE CLIPPER SGL USE 9680

## (undated) DEVICE — DRAPE IOBAN INCISE 23X17" 6650EZ

## (undated) DEVICE — INTRO SHEATH BRITE TIP 11FRX11CM 401-111M

## (undated) DEVICE — CATH ANGIO MARINER KUMPE 5FRX100CM 11732703

## (undated) DEVICE — COVER NEOPROBE SOFTFLEX 5X96" W/BANDS 20-PC596

## (undated) DEVICE — INFLATION DEVICE ENCORE  26 PRESSURE GAUGE M001151050

## (undated) DEVICE — LINEN TOWEL PACK X6 WHITE 5487

## (undated) DEVICE — PREP CHLORAPREP 26ML TINTED ORANGE  260815

## (undated) DEVICE — GLIDEWIRE TERUMO .035X180 ANG STIFF GS3508

## (undated) DEVICE — GLOVE PROTEXIS BLUE W/NEU-THERA 7.5  2D73EB75

## (undated) DEVICE — DECANTER BAG 2002S

## (undated) DEVICE — VESSEL LOOPS YELLOW MAXI 31145694

## (undated) DEVICE — SU SILK 3-0 TIE 18" SA64H

## (undated) DEVICE — NDL PERC ENTRY THINWALL 18GA 7.0" G00166

## (undated) DEVICE — BOWL 32OZ STERILE 01232

## (undated) DEVICE — SYR MEDRAD 150ML MARK 7 ARTERION ART700SYR

## (undated) DEVICE — DRAPE ISOLATION BAG 1003

## (undated) DEVICE — SOL NACL 0.9% IRRIG 1000ML BOTTLE 2F7124

## (undated) DEVICE — COVER ULTRASOUND PROBE W/GEL FLEXI-FEEL 6"X58" LF  25-FF658

## (undated) DEVICE — CLOSURE DEVICE 6FR VASC PROGLIDE MEDICATED SUTURE 12673-03

## (undated) DEVICE — CONNECTOR STOPCOCK 3-WAY HIGH PRESSURE (NAMIC) H965700550091

## (undated) DEVICE — WIPE PREMOIST CLEANSING WASHCLOTHS 7988

## (undated) DEVICE — GUIDEWIRE TERUMO .035X180 ANG GR3508

## (undated) DEVICE — CLIP HORIZON MED BLUE 002200

## (undated) DEVICE — WIRE GUIDE AMPLATZ SUPER STIFF 0.035"X260CM STR M001465090

## (undated) DEVICE — NDL CANNULA VASCULAR SOLO 21GX70MM 1187-4F070

## (undated) DEVICE — DRAPE CV SPLIT II 147X106" 9158

## (undated) DEVICE — WIRE GUIDE LUNDERQUIST 0.035"X260CM DBL CVD

## (undated) DEVICE — INTRO SHEATH BRITE TIP 7FRX11CM 401-711M

## (undated) DEVICE — SU PROLENE 5-0 RB-2DA 30" 8710H

## (undated) DEVICE — ESU GROUND PAD ADULT REM W/15' CORD E7507DB

## (undated) DEVICE — SHEATH INTRODUCER DRYSEAL FLEX W/HYDRO CT 12FRX33CM DSF1233

## (undated) DEVICE — SOL WATER IRRIG 1000ML BOTTLE 2F7114

## (undated) DEVICE — COVER EASY EQUIP BAG W/BAND LATEX FREE EZ-28

## (undated) RX ORDER — PHENYLEPHRINE HCL IN 0.9% NACL 1 MG/10 ML
SYRINGE (ML) INTRAVENOUS
Status: DISPENSED
Start: 2017-01-01

## (undated) RX ORDER — ALBUMIN, HUMAN INJ 5% 5 %
SOLUTION INTRAVENOUS
Status: DISPENSED
Start: 2017-01-01

## (undated) RX ORDER — PROPOFOL 10 MG/ML
INJECTION, EMULSION INTRAVENOUS
Status: DISPENSED
Start: 2017-01-01

## (undated) RX ORDER — FENTANYL CITRATE 50 UG/ML
INJECTION, SOLUTION INTRAMUSCULAR; INTRAVENOUS
Status: DISPENSED
Start: 2017-01-01

## (undated) RX ORDER — HEPARIN SODIUM 1000 [USP'U]/ML
INJECTION, SOLUTION INTRAVENOUS; SUBCUTANEOUS
Status: DISPENSED
Start: 2017-01-01

## (undated) RX ORDER — BUPIVACAINE HYDROCHLORIDE 2.5 MG/ML
INJECTION, SOLUTION EPIDURAL; INFILTRATION; INTRACAUDAL
Status: DISPENSED
Start: 2017-01-01

## (undated) RX ORDER — BUPIVACAINE HYDROCHLORIDE AND EPINEPHRINE 5; 5 MG/ML; UG/ML
INJECTION, SOLUTION EPIDURAL; INTRACAUDAL; PERINEURAL
Status: DISPENSED
Start: 2017-01-01

## (undated) RX ORDER — LIDOCAINE HYDROCHLORIDE AND EPINEPHRINE 5; 5 MG/ML; UG/ML
INJECTION, SOLUTION INFILTRATION; PERINEURAL
Status: DISPENSED
Start: 2017-01-01

## (undated) RX ORDER — IOPAMIDOL 755 MG/ML
INJECTION, SOLUTION INTRAVASCULAR
Status: DISPENSED
Start: 2017-01-01

## (undated) RX ORDER — LIDOCAINE HYDROCHLORIDE 10 MG/ML
INJECTION, SOLUTION EPIDURAL; INFILTRATION; INTRACAUDAL; PERINEURAL
Status: DISPENSED
Start: 2017-01-01